# Patient Record
Sex: MALE | Race: WHITE | NOT HISPANIC OR LATINO | Employment: OTHER | URBAN - NONMETROPOLITAN AREA
[De-identification: names, ages, dates, MRNs, and addresses within clinical notes are randomized per-mention and may not be internally consistent; named-entity substitution may affect disease eponyms.]

---

## 2017-02-04 ENCOUNTER — COMMUNICATION - GICH (OUTPATIENT)
Dept: FAMILY MEDICINE | Facility: OTHER | Age: 72
End: 2017-02-04

## 2017-02-04 DIAGNOSIS — I48.20 CHRONIC ATRIAL FIBRILLATION (H): ICD-10-CM

## 2017-02-04 DIAGNOSIS — Z79.01 LONG TERM CURRENT USE OF ANTICOAGULANT: ICD-10-CM

## 2017-05-01 ENCOUNTER — ANTICOAGULATION - GICH (OUTPATIENT)
Dept: INTERNAL MEDICINE | Facility: OTHER | Age: 72
End: 2017-05-01

## 2017-05-01 DIAGNOSIS — Z79.01 LONG TERM CURRENT USE OF ANTICOAGULANT: ICD-10-CM

## 2017-05-01 DIAGNOSIS — I48.20 CHRONIC ATRIAL FIBRILLATION (H): ICD-10-CM

## 2017-05-01 LAB
ERYTHROCYTE [DISTWIDTH] IN BLOOD BY AUTOMATED COUNT: 12.6 % (ref 11.5–15.5)
HCT VFR BLD AUTO: 35.1 % (ref 37–53)
HEMOGLOBIN: 12.1 G/DL (ref 13.5–17.5)
INR - HISTORICAL: 2
MCH RBC QN AUTO: 37.3 PG (ref 26–34)
MCHC RBC AUTO-ENTMCNC: 34.4 G/DL (ref 32–36)
MCV RBC AUTO: 108 FL (ref 80–100)
PLATELET # BLD AUTO: 139 THOU/CU MM (ref 140–440)
PMV BLD: 8.2 FL (ref 6.5–11)
RED BLOOD COUNT - HISTORICAL: 3.24 MIL/CU MM (ref 4.3–5.9)
WHITE BLOOD COUNT - HISTORICAL: 5.8 THOU/CU MM (ref 4.5–11)

## 2017-05-15 ENCOUNTER — HISTORY (OUTPATIENT)
Dept: EMERGENCY MEDICINE | Facility: OTHER | Age: 72
End: 2017-05-15

## 2017-05-17 RX ORDER — SODIUM PHOSPHATE,MONO-DIBASIC 19G-7G/118
2 ENEMA (ML) RECTAL DAILY
COMMUNITY
End: 2017-05-22

## 2017-05-19 ENCOUNTER — ANTICOAGULATION - GICH (OUTPATIENT)
Dept: INTERNAL MEDICINE | Facility: OTHER | Age: 72
End: 2017-05-19

## 2017-05-19 DIAGNOSIS — Z79.01 LONG TERM CURRENT USE OF ANTICOAGULANT: ICD-10-CM

## 2017-05-19 DIAGNOSIS — I48.20 CHRONIC ATRIAL FIBRILLATION (H): ICD-10-CM

## 2017-05-19 LAB — INR - HISTORICAL: 1.4

## 2017-05-22 ENCOUNTER — OFFICE VISIT (OUTPATIENT)
Dept: OTOLARYNGOLOGY | Facility: OTHER | Age: 72
End: 2017-05-22
Attending: OTOLARYNGOLOGY
Payer: MEDICARE

## 2017-05-22 ENCOUNTER — ANTICOAGULATION - GICH (OUTPATIENT)
Dept: INTERNAL MEDICINE | Facility: OTHER | Age: 72
End: 2017-05-22

## 2017-05-22 ENCOUNTER — TRANSFERRED RECORDS (OUTPATIENT)
Dept: HEALTH INFORMATION MANAGEMENT | Facility: HOSPITAL | Age: 72
End: 2017-05-22

## 2017-05-22 VITALS
SYSTOLIC BLOOD PRESSURE: 136 MMHG | WEIGHT: 200 LBS | OXYGEN SATURATION: 97 % | HEART RATE: 50 BPM | BODY MASS INDEX: 30.31 KG/M2 | TEMPERATURE: 96.6 F | HEIGHT: 68 IN | DIASTOLIC BLOOD PRESSURE: 58 MMHG

## 2017-05-22 DIAGNOSIS — R49.0 DYSPHONIA: ICD-10-CM

## 2017-05-22 DIAGNOSIS — G47.33 OSA ON CPAP: ICD-10-CM

## 2017-05-22 DIAGNOSIS — Z79.01 CHRONIC ANTICOAGULATION: ICD-10-CM

## 2017-05-22 DIAGNOSIS — J36 PERITONSILLAR ABSCESS: Primary | ICD-10-CM

## 2017-05-22 DIAGNOSIS — E04.1 THYROID NODULE: ICD-10-CM

## 2017-05-22 DIAGNOSIS — J38.3 GLOTTIC INSUFFICIENCY: ICD-10-CM

## 2017-05-22 DIAGNOSIS — Z79.01 LONG TERM CURRENT USE OF ANTICOAGULANT: ICD-10-CM

## 2017-05-22 DIAGNOSIS — I48.20 CHRONIC ATRIAL FIBRILLATION (H): ICD-10-CM

## 2017-05-22 LAB — INR - HISTORICAL: 1.3

## 2017-05-22 PROCEDURE — 99203 OFFICE O/P NEW LOW 30 MIN: CPT

## 2017-05-22 PROCEDURE — 99204 OFFICE O/P NEW MOD 45 MIN: CPT | Mod: 25 | Performed by: OTOLARYNGOLOGY

## 2017-05-22 PROCEDURE — 31575 DIAGNOSTIC LARYNGOSCOPY: CPT | Performed by: OTOLARYNGOLOGY

## 2017-05-22 ASSESSMENT — PAIN SCALES - GENERAL: PAINLEVEL: NO PAIN (0)

## 2017-05-22 NOTE — NURSING NOTE
"Chief Complaint   Patient presents with     Throat Problem     Pt has been referred by [facility] for peritonsillar abscess.  This started with a tooth extraction.  Tonsil feels good now.  Pt was placed on clindamycin and dexamethasone.       Initial /58 (BP Location: Right arm, Cuff Size: Adult Regular)  Pulse 50  Temp 96.6  F (35.9  C) (Tympanic)  Ht 5' 8\" (1.727 m)  Wt 200 lb (90.7 kg)  SpO2 97%  BMI 30.41 kg/m2 Estimated body mass index is 30.41 kg/(m^2) as calculated from the following:    Height as of this encounter: 5' 8\" (1.727 m).    Weight as of this encounter: 200 lb (90.7 kg).  Medication Reconciliation: complete   Seda Martel LPN      "

## 2017-05-22 NOTE — PROGRESS NOTES
Otolaryngology Consultation    Patient: Angel Grarett  : 1945    Patient presents with:  Throat Problem: Pt has been referred by [facility] for peritonsillar abscess.  This started with a tooth extraction.  Tonsil feels good now.  Pt was placed on clindamycin and dexamethasone.      HPI:  Angel Garrett is a 71 year old male seen today for a left peritonsillar abscess.  Onset 1 week ago . Seemed to correspond to a dental extraction upper left molar.        I was notified from [facility] last week and we started IV clindacin and decadron, observed Pelon overnight.  He responded well and was discharged on outpatient clindamycin    He denies any further dysphagia, odynophagia, trismus or tonsllar pain.  Referred otalgia resoved after IV abx therapy.     No prior peritonsillar abscess nor recurrent tonsillitis    He is anticoagulated, conric afib, with a hx of pulmonary HTN  He has JANESSA and is compliant with CPaP    He also has a hx of a vocal cord nodule, removed years ago at [place].  Since surgery he states his voice has 'never been the same' .   Cannot sing as he once could sing.  No aphonia or troublesome dysphonia    Never smoker    Abscess noted on from 5/15/, CT 2.8 cm Left tonsillar and peritonsillar abscess, reviewed personally with Pelon and his wife today.  There are multiple small nodules in thyroid.  No family hx of thyroid cancer nor personal hx radiation exposure.  No chronic lower thorat globus      Current Outpatient Rx   Medication Sig Dispense Refill     CLINDAMYCIN HCL PO Take 300 mg by mouth 4 times daily       DIGOXIN PO Take 250 mcg by mouth daily       Furosemide (LASIX PO) Take 20 mg by mouth 2 times daily       LISINOPRIL PO Take 20 mg by mouth daily       WARFARIN SODIUM PO Take 3 mg by mouth daily          Allergies: Review of patient's allergies indicates no known allergies.     No past medical history on file.    No past surgical history on file.    ENT family history reviewed    Social History  "  Substance Use Topics     Smoking status: Former Smoker     Quit date: 9/17/1984     Smokeless tobacco: Not on file     Alcohol use Yes       Review of Systems  ROS: 10 point ROS neg other than the symptoms noted above in the HPI and neck and joint pain    Physical Exam  /58 (BP Location: Right arm, Cuff Size: Adult Regular)  Pulse 50  Temp 96.6  F (35.9  C) (Tympanic)  Ht 5' 8\" (1.727 m)  Wt 200 lb (90.7 kg)  SpO2 97%  BMI 30.41 kg/m2  General - The patient is well nourished and well developed, and appears to have good nutritional status.  Alert and oriented to person and place, answers questions and cooperates with examination appropriately.   Head and Face - Normocephalic and atraumatic, with no gross asymmetry noted.  The facial nerve is intact, with strong symmetric movements.  Voice and Breathing - The patient was breathing comfortably without the use of accessory muscles. There was no wheezing, stridor, or stertor.  The patients voice was clear and strong, and had appropriate pitch and quality.  Ears -The external auditory canals are patent, the tympanic membranes are intact without effusion, retraction or mass.  Bony landmarks are intact.  Eyes - Extraocular movements intact, and the pupils were reactive to light.  Sclera were not icteric or injected, conjunctiva were pink and moist.  Mouth - Examination of the oral cavity showed pink, healthy oral mucosa. No lesions or ulcerations noted.  The tongue was mobile and midline, and there is an extracted #13, cracked capped #14 tooth, o/w dentition were in good condition.      Throat - The walls of the oropharynx were smooth, pink, moist, symmetric, and had no lesions or ulcerations.  The tonsillar pillars and soft palate were symmetric.  The uvula was midline on elevation.  Grade 2 symmetric tonsils  Neck - Normal midline excursion of the laryngotracheal complex during swallowing.  Full range of motion on passive movement.  Palpation of the " occipital, submental, submandibular, internal jugular chain, and supraclavicular nodes did not demonstrate any abnormal lymph nodes or masses.  Palpation of the thyroid was soft and smooth, with no nodules or goiter appreciated.  The trachea was mobile and midline.  Nose - External contour is symmetric, no gross deflection or scars.  Nasal mucosa is pink and moist with no abnormal mucus.  The septum and turbinates were evaluated: spur along floor left nares.  No polyps, masses, or purulence noted on examination.    Attempts at mirror laryngoscopy were not possible due to gag reflex.  Therefore I proceeded with a fiberoptic examination after informed consent.  First I sprayed both sides of the nose with a mixture of lidocaine and neosynephrine.  I then passed the scope through the nasal cavity.     The nasal cavity was unremarkable    The nasopharynx was mucosally covered and symmetric.  The eustachian tube openings were unobstructed.  Going further down I had a clear view of the base of tongue which had normal appearing lingual tonsillar tissue.  Narrow posterior oropharyngeal wall due to c-spine.   The base of tongue was free of lesions, and the vallecula was open.  The epiglottis was smooth and mucosally covered.  The supraglottic larynx was then clearly visualized.  The left arytenoid is sluggish and medialized upon phonation at times.  The vocal cords moved smoothly and symmetrically and were pearly white.  Mild glottic insufficiency.   The pyriform sinuses were open and without manfred mass or pooling of secretions upon valsalva, and the limited view of the postcricoid region did not show any lesions.  The patient tolerated the procedure well.      Impression and Plan- Angel LINNEA Garrett is a 71 year old male with:    ICD-10-CM    1. Peritonsillar abscess, history of, resolved J36    2. Dysphonia R49.0    3. 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 Thyroid nodule E04.1    5. JANESSA on CPAP G47.33    6. 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  Otolaryngology/Head and Neck Surgery  Allergy

## 2017-05-22 NOTE — PATIENT INSTRUCTIONS
Thank you for allowing Dr. Jacobo and our ENT team to participate in your care.  If you have a scheduling or an appointment question please contact Rita our Health Unit Coordinator at their direct line [phone].   ALL nursing questions or concerns can be directed to your ENT nurse at: [phone] - Nini    Increase Water Intake  Decrease Caffeine (2 cups or less per day)  Consider Voice Therapy  The peritonsillar abscess has been treated  Complete antibiotic as directed  Follow up with ENT as needed

## 2017-05-22 NOTE — MR AVS SNAPSHOT
"              After Visit Summary   5/22/2017    Angel Garrett    MRN: [id number]           Patient Information     Date Of Birth          1945        Visit Information        Provider Department      5/22/2017 8:45 AM Mandi Jacobo MD [facility]        Care Instructions    Thank you for allowing Dr. Jacobo and our ENT team to participate in your care.  If you have a scheduling or an appointment question please contact [facility] Unit Coordinator at their direct line [phone].   ALL nursing questions or concerns can be directed to your ENT nurse at: [phone] - Nini    Increase Water Intake  Decrease Caffeine (2 cups or less per day)  Consider Voice Therapy  The peritonsillar abscess has been treated  Complete antibiotic as directed  Follow up with ENT as needed        Follow-ups after your visit        Follow-up notes from your care team     Return if symptoms worsen or fail to improve.      Who to contact     If you have questions or need follow up information about today's clinic visit or your schedule please contact [facility] directly at [phone].  Normal or non-critical lab and imaging results will be communicated to you by MyChart, letter or phone within 4 business days after the clinic has received the results. If you do not hear from us within 7 days, please contact the clinic through [employer] or phone. If you have a critical or abnormal lab result, we will notify you by phone as soon as possible.  Submit refill requests through [employer] or call your pharmacy and they will forward the refill request to us. Please allow 3 business days for your refill to be completed.          Additional Information About Your Visit        [employer] Information     [employer] lets you send messages to your doctor, view your test results, renew your prescriptions, schedule appointments and more. To sign up, go to [email or web address] . Click on \"Log in\" on the " "left side of the screen, which will take you to the Welcome page. Then click on \"Sign up Now\" on the right side of the page.     You will be asked to enter the access code listed below, as well as some personal information. Please follow the directions to create your username and password.     Your access code is: [email or web address]  Expires: 2017  9:06 AM     Your access code will  in 90 days. If you need help or a new code, please call your [facility] clinic or [phone].        Care EveryWhere ID     This is your Care EveryWhere ID. This could be used by other organizations to access your [facility] medical records  [id number]        Your Vitals Were     Pulse Temperature Height Pulse Oximetry BMI (Body Mass Index)       50 96.6  F (35.9  C) (Tympanic) 5' 8\" (1.727 m) 97% 30.41 kg/m2        Blood Pressure from Last 3 Encounters:   17 136/58    Weight from Last 3 Encounters:   17 200 lb (90.7 kg)              Today, you had the following     No orders found for display       Primary Care Provider Office Phone # Fax #    Noman Miles [phone]       [facility] [address] [facility] [address]        Thank you!     Thank you for choosing [facility]  for your care. Our goal is always to provide you with excellent care. Hearing back from our patients is one way we can continue to improve our services. Please take a few minutes to complete the written survey that you may receive in the mail after your visit with us. Thank you!             Your Updated Medication List - Protect others around you: Learn how to safely use, store and throw away your medicines at www.disposemymeds.org.          This list is accurate as of: 17  9:06 AM.  Always use your most recent med list.                   Brand Name Dispense Instructions for use    CLINDAMYCIN HCL PO      Take 300 mg by mouth 4 times daily       DIGOXIN PO      Take 250 mcg by mouth daily    "    LASIX PO      Take 20 mg by mouth 2 times daily       LISINOPRIL PO      Take 20 mg by mouth daily       WARFARIN SODIUM PO      Take 3 mg by mouth daily

## 2017-05-31 ENCOUNTER — ANTICOAGULATION - GICH (OUTPATIENT)
Dept: INTERNAL MEDICINE | Facility: OTHER | Age: 72
End: 2017-05-31

## 2017-05-31 ENCOUNTER — OFFICE VISIT - GICH (OUTPATIENT)
Dept: FAMILY MEDICINE | Facility: OTHER | Age: 72
End: 2017-05-31

## 2017-05-31 ENCOUNTER — HISTORY (OUTPATIENT)
Dept: FAMILY MEDICINE | Facility: OTHER | Age: 72
End: 2017-05-31

## 2017-05-31 DIAGNOSIS — J36 PERITONSILLAR ABSCESS: ICD-10-CM

## 2017-05-31 DIAGNOSIS — I48.20 CHRONIC ATRIAL FIBRILLATION (H): ICD-10-CM

## 2017-05-31 DIAGNOSIS — Z79.01 LONG TERM CURRENT USE OF ANTICOAGULANT: ICD-10-CM

## 2017-05-31 LAB — INR - HISTORICAL: 1.7

## 2017-06-08 ENCOUNTER — ANTICOAGULATION - GICH (OUTPATIENT)
Dept: INTERNAL MEDICINE | Facility: OTHER | Age: 72
End: 2017-06-08

## 2017-06-08 DIAGNOSIS — I48.20 CHRONIC ATRIAL FIBRILLATION (H): ICD-10-CM

## 2017-06-08 DIAGNOSIS — Z79.01 LONG TERM CURRENT USE OF ANTICOAGULANT: ICD-10-CM

## 2017-06-08 LAB — INR - HISTORICAL: 2.1

## 2017-07-06 ENCOUNTER — ANTICOAGULATION - GICH (OUTPATIENT)
Dept: INTERNAL MEDICINE | Facility: OTHER | Age: 72
End: 2017-07-06

## 2017-07-06 DIAGNOSIS — I48.20 CHRONIC ATRIAL FIBRILLATION (H): ICD-10-CM

## 2017-07-06 DIAGNOSIS — Z79.01 LONG TERM CURRENT USE OF ANTICOAGULANT: ICD-10-CM

## 2017-07-06 LAB — INR - HISTORICAL: 1.9

## 2017-08-10 ENCOUNTER — AMBULATORY - GICH (OUTPATIENT)
Dept: SCHEDULING | Facility: OTHER | Age: 72
End: 2017-08-10

## 2017-08-11 ENCOUNTER — AMBULATORY - GICH (OUTPATIENT)
Dept: SCHEDULING | Facility: OTHER | Age: 72
End: 2017-08-11

## 2017-08-15 ENCOUNTER — AMBULATORY - GICH (OUTPATIENT)
Dept: SCHEDULING | Facility: OTHER | Age: 72
End: 2017-08-15

## 2017-08-22 ENCOUNTER — AMBULATORY - GICH (OUTPATIENT)
Dept: SCHEDULING | Facility: OTHER | Age: 72
End: 2017-08-22

## 2017-08-31 ENCOUNTER — AMBULATORY - GICH (OUTPATIENT)
Dept: LAB | Facility: OTHER | Age: 72
End: 2017-08-31

## 2017-08-31 DIAGNOSIS — I50.9 HEART FAILURE (H): ICD-10-CM

## 2017-08-31 DIAGNOSIS — I07.1 RHEUMATIC TRICUSPID INSUFFICIENCY: ICD-10-CM

## 2017-08-31 DIAGNOSIS — Q24.9 CONGENITAL MALFORMATION OF HEART: ICD-10-CM

## 2017-09-01 ENCOUNTER — AMBULATORY - GICH (OUTPATIENT)
Dept: LAB | Facility: OTHER | Age: 72
End: 2017-09-01

## 2017-09-01 DIAGNOSIS — I07.1 RHEUMATIC TRICUSPID INSUFFICIENCY: ICD-10-CM

## 2017-09-01 DIAGNOSIS — I50.9 HEART FAILURE (H): ICD-10-CM

## 2017-09-01 DIAGNOSIS — Q24.9 CONGENITAL MALFORMATION OF HEART: ICD-10-CM

## 2017-09-01 LAB
CREAT SERPL-MCNC: 1.44 MG/DL (ref 0.7–1.3)
GFR IF NOT AFRICAN AMERICAN - HISTORICAL: 48 ML/MIN/1.73M2
POTASSIUM SERPL-SCNC: 4.2 MMOL/L (ref 3.5–5.1)
SODIUM SERPL-SCNC: 142 MMOL/L (ref 133–143)

## 2017-09-18 ENCOUNTER — OFFICE VISIT - GICH (OUTPATIENT)
Dept: FAMILY MEDICINE | Facility: OTHER | Age: 72
End: 2017-09-18

## 2017-09-18 ENCOUNTER — HISTORY (OUTPATIENT)
Dept: FAMILY MEDICINE | Facility: OTHER | Age: 72
End: 2017-09-18

## 2017-09-18 ENCOUNTER — ANTICOAGULATION - GICH (OUTPATIENT)
Dept: INTERNAL MEDICINE | Facility: OTHER | Age: 72
End: 2017-09-18

## 2017-09-18 DIAGNOSIS — Z87.74 PERSONAL HISTORY OF CORRECTED CONGENITAL MALFORMATIONS OF HEART AND CIRCULATORY SYSTEM: ICD-10-CM

## 2017-09-18 DIAGNOSIS — M25.551 PAIN IN RIGHT HIP: ICD-10-CM

## 2017-09-18 DIAGNOSIS — I27.29 OTHER SECONDARY PULMONARY HYPERTENSION (H): ICD-10-CM

## 2017-09-18 DIAGNOSIS — Z79.01 LONG TERM CURRENT USE OF ANTICOAGULANT: ICD-10-CM

## 2017-09-18 DIAGNOSIS — I48.20 CHRONIC ATRIAL FIBRILLATION (H): ICD-10-CM

## 2017-09-18 LAB — INR - HISTORICAL: 1.9

## 2017-09-28 ENCOUNTER — ANTICOAGULATION - GICH (OUTPATIENT)
Dept: INTERNAL MEDICINE | Facility: OTHER | Age: 72
End: 2017-09-28

## 2017-09-28 DIAGNOSIS — I48.20 CHRONIC ATRIAL FIBRILLATION (H): ICD-10-CM

## 2017-09-28 DIAGNOSIS — Z79.01 LONG TERM CURRENT USE OF ANTICOAGULANT: ICD-10-CM

## 2017-09-28 LAB — INR - HISTORICAL: 1.5

## 2017-10-16 ENCOUNTER — OFFICE VISIT - GICH (OUTPATIENT)
Dept: FAMILY MEDICINE | Facility: OTHER | Age: 72
End: 2017-10-16

## 2017-10-16 ENCOUNTER — AMBULATORY - GICH (OUTPATIENT)
Dept: LAB | Facility: OTHER | Age: 72
End: 2017-10-16

## 2017-10-16 ENCOUNTER — HISTORY (OUTPATIENT)
Dept: FAMILY MEDICINE | Facility: OTHER | Age: 72
End: 2017-10-16

## 2017-10-16 ENCOUNTER — ANTICOAGULATION - GICH (OUTPATIENT)
Dept: INTERNAL MEDICINE | Facility: OTHER | Age: 72
End: 2017-10-16

## 2017-10-16 DIAGNOSIS — I48.20 CHRONIC ATRIAL FIBRILLATION (H): ICD-10-CM

## 2017-10-16 DIAGNOSIS — Z87.74 PERSONAL HISTORY OF CORRECTED CONGENITAL MALFORMATIONS OF HEART AND CIRCULATORY SYSTEM: ICD-10-CM

## 2017-10-16 DIAGNOSIS — Z79.01 ANTICOAGULATION MONITORING, INR RANGE 2-3: Primary | ICD-10-CM

## 2017-10-16 DIAGNOSIS — Z79.01 LONG TERM CURRENT USE OF ANTICOAGULANT: ICD-10-CM

## 2017-10-16 LAB
ANION GAP - HISTORICAL: 8 (ref 5–18)
BUN SERPL-MCNC: 47 MG/DL (ref 7–25)
BUN/CREAT RATIO - HISTORICAL: 30
CALCIUM SERPL-MCNC: 9.6 MG/DL (ref 8.6–10.3)
CHLORIDE SERPLBLD-SCNC: 102 MMOL/L (ref 98–107)
CO2 SERPL-SCNC: 29 MMOL/L (ref 21–31)
CREAT SERPL-MCNC: 1.56 MG/DL (ref 0.7–1.3)
GFR IF NOT AFRICAN AMERICAN - HISTORICAL: 44 ML/MIN/1.73M2
GLUCOSE SERPL-MCNC: 110 MG/DL (ref 70–105)
INR - HISTORICAL: 2.4
POTASSIUM SERPL-SCNC: 4.3 MMOL/L (ref 3.5–5.1)
SODIUM SERPL-SCNC: 139 MMOL/L (ref 133–143)

## 2017-10-24 ENCOUNTER — COMMUNICATION - GICH (OUTPATIENT)
Dept: FAMILY MEDICINE | Facility: OTHER | Age: 72
End: 2017-10-24

## 2017-10-24 DIAGNOSIS — I48.20 CHRONIC ATRIAL FIBRILLATION (H): ICD-10-CM

## 2017-11-01 ENCOUNTER — ANTICOAGULATION - GICH (OUTPATIENT)
Dept: INTERNAL MEDICINE | Facility: OTHER | Age: 72
End: 2017-11-01

## 2017-11-01 DIAGNOSIS — I48.20 CHRONIC ATRIAL FIBRILLATION (H): ICD-10-CM

## 2017-11-01 DIAGNOSIS — Z79.01 LONG TERM CURRENT USE OF ANTICOAGULANT: ICD-10-CM

## 2017-11-01 LAB — INR - HISTORICAL: 1.9

## 2017-11-03 DIAGNOSIS — Z01.818 PRE-OP EXAM: Primary | ICD-10-CM

## 2017-11-03 DIAGNOSIS — Z79.01 ANTICOAGULATED ON COUMADIN: ICD-10-CM

## 2017-11-03 LAB — INR PPP: 1.86 (ref 0.8–1.2)

## 2017-11-03 PROCEDURE — 85610 PROTHROMBIN TIME: CPT | Performed by: DENTIST

## 2017-11-03 PROCEDURE — 36415 COLL VENOUS BLD VENIPUNCTURE: CPT | Performed by: DENTIST

## 2017-11-07 ENCOUNTER — ANTICOAGULATION - GICH (OUTPATIENT)
Dept: INTERNAL MEDICINE | Facility: OTHER | Age: 72
End: 2017-11-07

## 2017-11-07 DIAGNOSIS — Z79.01 LONG TERM CURRENT USE OF ANTICOAGULANT: ICD-10-CM

## 2017-11-07 DIAGNOSIS — I48.20 CHRONIC ATRIAL FIBRILLATION (H): ICD-10-CM

## 2017-11-07 LAB — INR - HISTORICAL: 2

## 2017-12-04 ENCOUNTER — ANTICOAGULATION - GICH (OUTPATIENT)
Dept: INTERNAL MEDICINE | Facility: OTHER | Age: 72
End: 2017-12-04

## 2017-12-04 ENCOUNTER — COMMUNICATION - GICH (OUTPATIENT)
Dept: FAMILY MEDICINE | Facility: OTHER | Age: 72
End: 2017-12-04

## 2017-12-04 DIAGNOSIS — I48.20 CHRONIC ATRIAL FIBRILLATION (H): ICD-10-CM

## 2017-12-04 DIAGNOSIS — Z79.01 LONG TERM CURRENT USE OF ANTICOAGULANT: ICD-10-CM

## 2017-12-04 DIAGNOSIS — I10 ESSENTIAL (PRIMARY) HYPERTENSION: ICD-10-CM

## 2017-12-04 DIAGNOSIS — R73.03 PREDIABETES: ICD-10-CM

## 2017-12-04 LAB — INR - HISTORICAL: 2.2

## 2017-12-06 ENCOUNTER — OFFICE VISIT - GICH (OUTPATIENT)
Dept: FAMILY MEDICINE | Facility: OTHER | Age: 72
End: 2017-12-06

## 2017-12-06 ENCOUNTER — HISTORY (OUTPATIENT)
Dept: FAMILY MEDICINE | Facility: OTHER | Age: 72
End: 2017-12-06

## 2017-12-06 DIAGNOSIS — N42.9 DISORDER OF PROSTATE: ICD-10-CM

## 2017-12-06 DIAGNOSIS — G47.33 OBSTRUCTIVE SLEEP APNEA: ICD-10-CM

## 2017-12-06 DIAGNOSIS — E78.5 HYPERLIPIDEMIA: ICD-10-CM

## 2017-12-06 DIAGNOSIS — Z79.01 LONG TERM CURRENT USE OF ANTICOAGULANT: ICD-10-CM

## 2017-12-06 DIAGNOSIS — I10 ESSENTIAL (PRIMARY) HYPERTENSION: ICD-10-CM

## 2017-12-06 DIAGNOSIS — I48.20 CHRONIC ATRIAL FIBRILLATION (H): ICD-10-CM

## 2017-12-06 DIAGNOSIS — M25.551 PAIN IN RIGHT HIP: ICD-10-CM

## 2017-12-06 DIAGNOSIS — I27.20 PULMONARY HYPERTENSION (H): ICD-10-CM

## 2017-12-06 DIAGNOSIS — Z87.74 PERSONAL HISTORY OF CORRECTED CONGENITAL MALFORMATIONS OF HEART AND CIRCULATORY SYSTEM: ICD-10-CM

## 2017-12-06 DIAGNOSIS — R73.03 PREDIABETES: ICD-10-CM

## 2017-12-06 LAB
A/G RATIO - HISTORICAL: 1.2 (ref 1–2)
ABSOLUTE BASOPHILS - HISTORICAL: 0 THOU/CU MM
ABSOLUTE EOSINOPHILS - HISTORICAL: 0.1 THOU/CU MM
ABSOLUTE IMMATURE GRANULOCYTES(METAS,MYELOS,PROS) - HISTORICAL: 0 THOU/CU MM
ABSOLUTE LYMPHOCYTES - HISTORICAL: 1.8 THOU/CU MM (ref 0.9–2.9)
ABSOLUTE MONOCYTES - HISTORICAL: 0.5 THOU/CU MM
ABSOLUTE NEUTROPHILS - HISTORICAL: 3 THOU/CU MM (ref 1.7–7)
ALBUMIN SERPL-MCNC: 4.1 G/DL (ref 3.5–5.7)
ALP SERPL-CCNC: 74 IU/L (ref 34–104)
ALT (SGPT) - HISTORICAL: 13 IU/L (ref 7–52)
ANION GAP - HISTORICAL: 9 (ref 5–18)
AST SERPL-CCNC: 20 IU/L (ref 13–39)
BASOPHILS # BLD AUTO: 0.4 %
BILIRUB SERPL-MCNC: 0.9 MG/DL (ref 0.3–1)
BUN SERPL-MCNC: 38 MG/DL (ref 7–25)
BUN/CREAT RATIO - HISTORICAL: 27
CALCIUM SERPL-MCNC: 9.1 MG/DL (ref 8.6–10.3)
CHLORIDE SERPLBLD-SCNC: 103 MMOL/L (ref 98–107)
CHOL/HDL RATIO - HISTORICAL: 2.49
CHOLESTEROL TOTAL: 152 MG/DL
CO2 SERPL-SCNC: 28 MMOL/L (ref 21–31)
CREAT SERPL-MCNC: 1.43 MG/DL (ref 0.7–1.3)
DATE OF LAST DOSE - HISTORICAL: NORMAL
DIGOXIN - HISTORICAL: 0.9 NG/ML (ref 0.8–2)
EOSINOPHIL NFR BLD AUTO: 2.4 %
ERYTHROCYTE [DISTWIDTH] IN BLOOD BY AUTOMATED COUNT: 12.7 % (ref 11.5–15.5)
ESTIMATED AVERAGE GLUCOSE: 103 MG/DL
GFR IF NOT AFRICAN AMERICAN - HISTORICAL: 48 ML/MIN/1.73M2
GLOBULIN - HISTORICAL: 3.5 G/DL (ref 2–3.7)
GLUCOSE SERPL-MCNC: 106 MG/DL (ref 70–105)
HCT VFR BLD AUTO: 39.9 % (ref 37–53)
HDLC SERPL-MCNC: 61 MG/DL (ref 23–92)
HEMOGLOBIN A1C MONITORING (POCT) - HISTORICAL: 5.2 % (ref 4–6.2)
HEMOGLOBIN: 13.7 G/DL (ref 13.5–17.5)
IMMATURE GRANULOCYTES(METAS,MYELOS,PROS) - HISTORICAL: 0.2 %
LDLC SERPL CALC-MCNC: 74 MG/DL
LYMPHOCYTES NFR BLD AUTO: 33.8 % (ref 20–44)
MCH RBC QN AUTO: 36.5 PG (ref 26–34)
MCHC RBC AUTO-ENTMCNC: 34.3 G/DL (ref 32–36)
MCV RBC AUTO: 106 FL (ref 80–100)
MONOCYTES NFR BLD AUTO: 8.5 %
NEUTROPHILS NFR BLD AUTO: 54.7 % (ref 42–72)
NON-HDL CHOLESTEROL - HISTORICAL: 91 MG/DL
PLATELET # BLD AUTO: 147 THOU/CU MM (ref 140–440)
PMV BLD: 9.7 FL (ref 6.5–11)
POTASSIUM SERPL-SCNC: 3.9 MMOL/L (ref 3.5–5.1)
PROT SERPL-MCNC: 7.6 G/DL (ref 6.4–8.9)
PROVIDER ORDERDED STATUS - HISTORICAL: NORMAL
PSA TOTAL (DIAGNOSTIC) - HISTORICAL: 3.76 NG/ML
RED BLOOD COUNT - HISTORICAL: 3.75 MIL/CU MM (ref 4.3–5.9)
SODIUM SERPL-SCNC: 140 MMOL/L (ref 133–143)
TIME OF DOSE: NORMAL
TRIGL SERPL-MCNC: 84 MG/DL
WHITE BLOOD COUNT - HISTORICAL: 5.4 THOU/CU MM (ref 4.5–11)

## 2017-12-27 NOTE — PROGRESS NOTES
Patient Information     Patient Name MRN Sex Angel Pisano [id number] Male 1945      Progress Notes by Noman Miles MD at 10/16/2017 11:15 AM     Author:  Noman Miles MD Service:  (none) Author Type:  Physician     Filed:  10/16/2017 11:31 AM Encounter Date:  10/16/2017 Status:  Signed     :  Noman Miles MD (Physician)            SUBJECTIVE:  Angel Garrett is a 72 y.o. male who presents for follow-up of pulmonary arterial hypertension, congenital heart disease and chronic atrial fibrillation. He was seen recently at the [facility] and placed on torsemide instead of furosemide. His creatinine was up slightly at last visit and I wanted him to come back for recheck. His weight is up 2 pounds since last check. He denies peripheral edema and overall feels well.    No Known Allergies and   Current Outpatient Prescriptions on File Prior to Visit       Medication  Sig Dispense Refill     acetaminophen (TYLENOL EXTRA STRGTH) 500 mg tablet Take 1,000 mg by mouth every 4 hours while awake. Max acetaminophen dose: 4000mg in 24 hrs.       atenolol (TENORMIN) 50 mg tablet Take 1 tablet by mouth once daily. 90 tablet 3     digoxin (LANOXIN) 250 mcg tablet Take 1 tablet by mouth once daily. 90 tablet 3     GLUCOSAMINE/CHONDROITIN SULF A (GLUCOSAMINE-CHONDROITIN ORAL) Take 2 tablets by mouth once daily.       lisinopril (PRINIVIL; ZESTRIL) 20 mg tablet Take 20 mg by mouth once daily.       omega-3 fatty acids-vitamin E (FISH OIL) 1,000 mg cap Take 2 capsules by mouth once daily.  0     tadalafil, antihypertensive, (ADCIRCA) 20 mg tab Take 40 mg by mouth every morning.       warfarin (COUMADIN) 3 mg tablet Take 9 mg x one day/week, and 6 mg x six days/week. 180 tablet 0     No current facility-administered medications on file prior to visit.        OBJECTIVE:  /40  Pulse 60  Wt 91.6 kg (202 lb)  BMI 30.71 kg/m2  EXAM:  General Appearance: Pleasant, alert, appropriate appearance for  age. No acute distress  Chest/Respiratory Exam: Normal chest wall and respirations. Clear to auscultation.  Cardiovascular Exam: Bradycardic rate with an irregular rhythm, no murmurs.  Foot Exam: No peripheral edema noted.    SODIUM      Date Value Ref Range Status   10/16/2017 139 133 - 143 mmol/L Final     POTASSIUM      Date Value Ref Range Status   10/16/2017 4.3 3.5 - 5.1 mmol/L Final     CHLORIDE      Date Value Ref Range Status   10/16/2017 102 98 - 107 mmol/L Final     CO2,TOTAL      Date Value Ref Range Status   10/16/2017 29 21 - 31 mmol/L Final     ANION GAP      Date Value Ref Range Status   10/16/2017 8 5 - 18                 Final     GLUCOSE      Date Value Ref Range Status   10/16/2017 110 (H) 70 - 105 mg/dL Final     BUN      Date Value Ref Range Status   10/16/2017 47 (H) 7 - 25 mg/dL Final     CREATININE      Date Value Ref Range Status   10/16/2017 1.56 (H) 0.70 - 1.30 mg/dL Final     BUN/CREAT RATIO                Date Value Ref Range Status   10/16/2017 30                 Final     CALCIUM      Date Value Ref Range Status   10/16/2017 9.6 8.6 - 10.3 mg/dL Final     ASSESSMENT/Plan :      ICD-10-CM    1. Hx of congenital heart disease  Patient's blood pressure is low and he appears a little dry. Elected to decrease torsemide to 20 mg once daily. He will weigh himself daily and watch for return of peripheral edema.  Z87.74    2. Chronic atrial fibrillation (HC) I48.2 torsemide (DEMADEX) 20 mg tablet       Noman Miles MD

## 2017-12-27 NOTE — PROGRESS NOTES
Patient Information     Patient Name MRN Sex Angel Pisano [id number] Male 1945      Progress Notes by Noman Miles MD at 2017  9:45 AM     Author:  Noman Miles MD Service:  (none) Author Type:  Physician     Filed:  2017 11:56 AM Encounter Date:  2017 Status:  Signed     :  Noman Miles MD (Physician)            SUBJECTIVE:  Angel Garrett is a 71 y.o. male who presents to discuss several issues. He was recently evaluated at the [facility] for his pulmonary arterial hypertension and congenital heart disease. He reports that his furosemide was discontinued and he was placed on torsemide. He had follow-up electrolytes done here recently which he would like to review. He was having issues with diarrhea following antibiotic use but this has now resolved. There were concerns that he may have C. difficile. He also has been having intermittent right hip pain which seems to be positional. This doesn't bother him continuously but occasionally with movement will note a twinge of pain in the right groin crease. His weight is down 15 pounds in the last month since starting torsemide. He feels well with no shortness of breath or chest pain.    No Known Allergies,   Current Outpatient Prescriptions on File Prior to Visit       Medication  Sig Dispense Refill     acetaminophen (TYLENOL EXTRA STRGTH) 500 mg tablet Take 1,000 mg by mouth every 4 hours while awake. Max acetaminophen dose: 4000mg in 24 hrs.       atenolol (TENORMIN) 50 mg tablet Take 1 tablet by mouth once daily. 90 tablet 3     digoxin (LANOXIN) 250 mcg tablet Take 1 tablet by mouth once daily. 90 tablet 3     GLUCOSAMINE/CHONDROITIN SULF A (GLUCOSAMINE-CHONDROITIN ORAL) Take 2 tablets by mouth once daily.       lisinopril (PRINIVIL; ZESTRIL) 20 mg tablet Take 20 mg by mouth once daily.       omega-3 fatty acids-vitamin E (FISH OIL) 1,000 mg cap Take 2 capsules by mouth once daily.  0     tadalafil,  antihypertensive, (ADCIRCA) 20 mg tab Take 40 mg by mouth every morning.       warfarin (COUMADIN) 3 mg tablet Take 6 mg daily 180 tablet 0     No current facility-administered medications on file prior to visit.    , No past medical history on file. and   Past Surgical History:      Procedure  Laterality Date     CARDIAC CATHETERIZATION  2016     CARDIAC SURGERY  2002    Pericardial patch, closure of ASD, repair of PFO and repair of anomalous pulmonary venous drainage.       COLONOSCOPY SCREENING  ~2009    Colonography       OPEN CHOLECYSTECTOMY  2008     VASECTOMY         Respiratory: Negative  Cardiovascular: Negative    OBJECTIVE:  /58  Pulse 52  Wt 90.7 kg (200 lb)  BMI 30.41 kg/m2  EXAM:  General Appearance: Pleasant, alert, appropriate appearance for age. No acute distress  Head Exam: Normocephalic, without obvious abnormality.  Chest/Respiratory Exam: Normal chest wall and respirations. Clear to auscultation.  Cardiovascular Exam: Bradycardic rate with regular rhythm, no murmurs.  Foot Exam: No edema noted today.  Musculoskeletal exam: Slightly diminished range of motion of the right hip noted to internal/external rotation. Groin pain noted with passive range of motion.    Results for orders placed or performed in visit on 09/01/17      SODIUM      Result  Value Ref Range    SODIUM 142 133 - 143 mmol/L   POTASSIUM      Result  Value Ref Range    POTASSIUM 4.2 3.5 - 5.1 mmol/L   CREATININE      Result  Value Ref Range    CREATININE 1.44 (H) 0.70 - 1.30 mg/dL    GFR if African American 58 (L) >60 ml/min/1.73m2    GFR if not  48 (L) >60 ml/min/1.73m2       ASSESSMENT/Plan :      ICD-10-CM    1. Hx of congenital heart disease  He has had a good response to torsemide with a reported 15 pound weight loss. His creatinine has risen a little bit above baseline and will plan to recheck this at follow-up in one month. Z87.74 BASIC METABOLIC PANEL   2. Pulmonary hypertension (HC)  Continue  current treatment.  I27.2    3. Right hip pain  Findings most consistent with early osteoarthritis of the right hip. He is not interested in imaging at this time.  M25.551        Noman Miles MD

## 2017-12-28 NOTE — PATIENT INSTRUCTIONS
Patient Information     Patient Name MRAngel Zaldivar [id number] Male 1945      Patient Instructions by Saba Honeycutt RN at 2017 10:45 AM     Author:  Saba Honeycutt RN Service:  (none) Author Type:  NURS- Registered Nurse     Filed:  2017 10:38 AM Encounter Date:  2017 Status:  Signed     :  Saba Honeycutt RN (NURS- Registered Nurse)            2017 Details    Sun Mon Tue Wed Thu Fri Sat          1               2                 3               4               5               6               7               8               9                 10               11               12               13               14               15               16                 17               18      6 mg   See details      19      6 mg         20      6 mg         21      6 mg         22      6 mg         23      6 mg           24      6 mg         25      6 mg         26      6 mg         27      6 mg         28      6 mg         29      6 mg         30      6 mg          Date Details    This INR check               How to take your warfarin dose     To take:  6 mg Take two of the 3 mg tablets.           2017 Details    Sun Mon Tue Wed Thu Fri Sat     1      6 mg         2      6 mg         3      6 mg         4      6 mg         5      6 mg         6      6 mg         7      6 mg           8      6 mg         9      6 mg         10      6 mg         11      6 mg         12      6 mg         13      6 mg         14      6 mg           15      6 mg         16      6 mg         17      6 mg         18      6 mg         19      6 mg         20      6 mg         21      6 mg           22      6 mg         23      6 mg         24      6 mg         25      6 mg         26      6 mg         27      6 mg         28      6 mg           29      6 mg         30      6 mg         31      6 mg              Date Details   No additional details            How to take your  warfarin dose     To take:  6 mg Take two of the 3 mg tablets.           November 2017 Details    Sun Mon Tue Wed Thu Fri Sat        1      6 mg         2      6 mg         3      6 mg         4      6 mg           5      6 mg         6      6 mg         7      6 mg         8      6 mg         9      6 mg         10      6 mg         11      6 mg           12      6 mg         13      6 mg         14               15               16               17               18                 19               20               21               22               23               24               25                 26               27               28               29               30                  Date Details   No additional details    Date of next INR:  11/13/2017         How to take your warfarin dose     To take:  6 mg Take two of the 3 mg tablets.             Description          Continue same Warfarin dose and recheck in 8 weeks. Likes to have INR on the lower end of range. Saba Honeycutt RN    9/18/2017  10:37 AM                Anticoagulation Summary as of 9/18/2017     INR goal 2.0-3.0    Today's INR 1.9    Next INR check 11/13/2017          Call your Anticoagulation Clinic at Dept: [phone]   if:   1. Any medications are started, stopped, or there is a change in dose.  2. You experience any bleeding that is not easily stopped or if it is recurrent.  3. You notice an increase in bruising or any bruising that does not heal.  4. You are scheduled for surgery, colonoscopy, dental extraction or any other procedure where you may need to stop your Coumadin (warfarin).

## 2017-12-28 NOTE — TELEPHONE ENCOUNTER
Patient Information     Patient Name MRN Angel Whitten [id number] Male 1945      Telephone Encounter by Breanna Thakur at 10/25/2017  9:53 AM     Author:  Breanna Thakur Service:  (none) Author Type:  (none)     Filed:  10/25/2017  9:53 AM Encounter Date:  10/24/2017 Status:  Signed     :  Breanna Thakur            Patient notified.  Breanna Thakur LPN  10/25/2017  9:53 AM

## 2017-12-28 NOTE — PATIENT INSTRUCTIONS
Patient Information     Patient Name MRAngel Zaldivar [id number] Male 1945      Patient Instructions by Saba Honeycutt RN at 2017  9:15 AM     Author:  Saba Honeycutt RN Service:  (none) Author Type:  NURS- Registered Nurse     Filed:  2017  9:22 AM Encounter Date:  2017 Status:  Signed     :  Saba Honeycutt RN (NURS- Registered Nurse)            2017 Details    Sun Mon Tue Wed Thu Fri Sat        1               2               3               4                 5               6               7      6 mg   See details      8      6 mg         9      6 mg         10      6 mg         11      9 mg           12      6 mg         13      6 mg         14      6 mg         15      6 mg         16      6 mg         17      6 mg         18      9 mg           19      6 mg         20      6 mg         21      6 mg         22      6 mg         23      6 mg         24      6 mg         25      9 mg           26      6 mg         27      6 mg         28      6 mg         29      6 mg         30      6 mg            Date Details    This INR check               How to take your warfarin dose     To take:  6 mg Take two of the 3 mg tablets.    To take:  9 mg Take three of the 3 mg tablets.           2017 Details    Sun Mon Tue Wed Thu Fri Sat          1      6 mg         2      9 mg           3      6 mg         4      6 mg         5      6 mg         6               7               8               9                 10               11               12               13               14               15               16                 17               18               19               20               21               22               23                 24               25               26               27               28               29               30                 31                      Date Details   No additional details    Date of next INR:  2017          How to take your warfarin dose     To take:  6 mg Take two of the 3 mg tablets.    To take:  9 mg Take three of the 3 mg tablets.             Description          Continue same Warfarin dose and recheck in 1 month.  Saba Honeycutt RN    11/7/2017  9:21 AM                Anticoagulation Summary as of 11/7/2017     INR goal 2.0-3.0    Today's INR 2.0    Next INR check 12/5/2017          Call your Anticoagulation Clinic at Dept: [phone]   if:   1. Any medications are started, stopped, or there is a change in dose.  2. You experience any bleeding that is not easily stopped or if it is recurrent.  3. You notice an increase in bruising or any bruising that does not heal.  4. You are scheduled for surgery, colonoscopy, dental extraction or any other procedure where you may need to stop your Coumadin (warfarin).

## 2017-12-29 NOTE — PATIENT INSTRUCTIONS
Patient Information     Patient Name MRN Angel Whitten [id number] Male 1945      Patient Instructions by Saba Honeycutt RN at 2017 11:15 AM     Author:  Saba Honeycutt RN Service:  (none) Author Type:  NURS- Registered Nurse     Filed:  2017 10:48 AM Encounter Date:  2017 Status:  Signed     :  Saba Honeycutt RN (NURS- Registered Nurse)            2017 Details    Sun Mon  Thu Fri Sat        1      6 mg   See details      2      6 mg         3      6 mg         4      9 mg           5      6 mg         6      6 mg         7      6 mg         8      6 mg         9      6 mg         10      6 mg         11      9 mg           12      6 mg         13      6 mg         14      6 mg         15      6 mg         16      6 mg         17      6 mg         18      9 mg           19      6 mg         20      6 mg         21      6 mg         22      6 mg         23      6 mg         24      6 mg         25      9 mg           26      6 mg         27      6 mg         28      6 mg         29      6 mg         30                  Date Details    This INR check       Date of next INR:  2017         How to take your warfarin dose     To take:  6 mg Take two of the 3 mg tablets.    To take:  9 mg Take three of the 3 mg tablets.             Description          Continue same Warfarin dose and recheck in 1 month.  Saba Honeycutt RN   2017    10:47 AM                  Anticoagulation Summary as of 2017     INR goal 2.0-3.0    Today's INR 1.9    Next INR check 2017          Call your Anticoagulation Clinic at Dept: [phone]   if:   1. Any medications are started, stopped, or there is a change in dose.  2. You experience any bleeding that is not easily stopped or if it is recurrent.  3. You notice an increase in bruising or any bruising that does not heal.  4. You are scheduled for surgery, colonoscopy, dental extraction or any other procedure  where you may need to stop your Coumadin (warfarin).

## 2017-12-29 NOTE — PATIENT INSTRUCTIONS
Patient Information     Patient Name MRAngel Zaldivar [id number] Male 1945      Patient Instructions by Saba Honeycutt RN at 2017 10:15 AM     Author:  Saba Honeycutt RN Service:  (none) Author Type:  NURS- Registered Nurse     Filed:  2017 10:13 AM Encounter Date:  2017 Status:  Signed     :  Saba Honeycutt RN (NURS- Registered Nurse)            2017 Details    Sun Mon Tue Wed Thu Fri Sat           1                 2               3               4               5               6      6 mg   See details      7      6 mg         8      6 mg           9      6 mg         10      6 mg         11      6 mg         12      6 mg         13      6 mg         14      6 mg         15      6 mg           16      6 mg         17      6 mg         18      6 mg         19      6 mg         20      6 mg         21      6 mg         22      6 mg           23      6 mg         24      6 mg         25      6 mg         26      6 mg         27      6 mg         28      6 mg         29      6 mg           30      6 mg         31      6 mg               Date Details    This INR check               How to take your warfarin dose     To take:  6 mg Take two of the 3 mg tablets.           2017 Details    Sun Mon Tue Wed Thu Fri Sat       1      6 mg         2      6 mg         3      6 mg         4      6 mg         5      6 mg           6      6 mg         7      6 mg         8      6 mg         9      6 mg         10      6 mg         11      6 mg         12      6 mg           13      6 mg         14      6 mg         15      6 mg         16      6 mg         17      6 mg         18      6 mg         19      6 mg           20      6 mg         21      6 mg         22      6 mg         23      6 mg         24      6 mg         25      6 mg         26      6 mg           27      6 mg         28      6 mg         29      6 mg         30      6 mg         31      6 mg             Date Details   No additional details            How to take your warfarin dose     To take:  6 mg Take two of the 3 mg tablets.           September 2017 Details    Sun Mon Tue Wed Thu Fri Sat          1      6 mg         2      6 mg           3      6 mg         4      6 mg         5      6 mg         6      6 mg         7      6 mg         8      6 mg         9      6 mg           10      6 mg         11      6 mg         12      6 mg         13      6 mg         14      6 mg         15      6 mg         16      6 mg           17      6 mg         18      6 mg         19               20               21               22               23                 24               25               26               27               28               29               30                Date Details   No additional details    Date of next INR:  9/18/2017         How to take your warfarin dose     To take:  6 mg Take two of the 3 mg tablets.             Description          Continue same Warfarin dose and recheck in 8 weeks.  Saba Honeycutt RN   7/6/2017    10:10 AM                   Anticoagulation Summary as of 7/6/2017     INR goal 2.0-3.0    Today's INR 1.9    Next INR check 9/18/2017          Call your Anticoagulation Clinic at Dept: [phone]   if:   1. Any medications are started, stopped, or there is a change in dose.  2. You experience any bleeding that is not easily stopped or if it is recurrent.  3. You notice an increase in bruising or any bruising that does not heal.  4. You are scheduled for surgery, colonoscopy, dental extraction or any other procedure where you may need to stop your Coumadin (warfarin).

## 2017-12-29 NOTE — PATIENT INSTRUCTIONS
Patient Information     Patient Name MRN Angel Whitten [id number] Male 1945      Patient Instructions by Izabella Verma RN at 2017 10:45 AM     Author:  Izabella Verma RN Service:  (none) Author Type:  NURS- Registered Nurse     Filed:  2017 11:09 AM Encounter Date:  2017 Status:  Signed     :  Izabella Verma RN (NURS- Registered Nurse)            2017 Details    Sun Mon Tue Wed Thu Fri Sat          1               2                 3               4               5               6               7               8               9                 10               11               12               13               14               15               16                 17               18               19               20               21               22               23                 24               25               26               27               28      6 mg   See details      29      6 mg         30      9 mg          Date Details    This INR check               How to take your warfarin dose     To take:  6 mg Take two of the 3 mg tablets.    To take:  9 mg Take three of the 3 mg tablets.           2017 Details    Sun Mon Tue Wed Thu Fri Sat     1      6 mg         2      6 mg         3      6 mg         4      6 mg         5      6 mg         6      6 mg         7      9 mg           8      6 mg         9      6 mg         10      6 mg         11      6 mg         12      6 mg         13      6 mg         14      9 mg           15      6 mg         16      6 mg         17               18               19               20               21                 22               23               24               25               26               27               28                 29               30               31                    Date Details   No additional details    Date of next INR:  10/16/2017         How to take your warfarin dose     To  take:  6 mg Take two of the 3 mg tablets.    To take:  9 mg Take three of the 3 mg tablets.             Description          Having oral surgery tomorrow, will take 6 mg the next two days (unless surgeon tells him differently.  Then increase Warfarin dose to 9 mg x one day/week, and 6 mg x six days/week.  Recheck INR in two weeks.  DAT BRAND RN ....................  9/28/2017   11:09 AM                  Anticoagulation Summary as of 9/28/2017     INR goal 2.0-3.0    Today's INR 1.5!    Next INR check 10/16/2017          Call your Anticoagulation Clinic at Dept: [phone]   if:   1. Any medications are started, stopped, or there is a change in dose.  2. You experience any bleeding that is not easily stopped or if it is recurrent.  3. You notice an increase in bruising or any bruising that does not heal.  4. You are scheduled for surgery, colonoscopy, dental extraction or any other procedure where you may need to stop your Coumadin (warfarin).

## 2017-12-29 NOTE — PATIENT INSTRUCTIONS
Patient Information     Patient Name MRN Angel Whitten [id number] Male 1945      Patient Instructions by Izabella Verma RN at 10/16/2017 10:15 AM     Author:  Izabella Verma RN Service:  (none) Author Type:  NURS- Registered Nurse     Filed:  10/16/2017 10:22 AM Encounter Date:  10/16/2017 Status:  Signed     :  Izabella Verma RN (NURS- Registered Nurse)            2017 Details    Sun Mon Tue Wed Thu Fri Sat     1               2               3               4               5               6               7                 8               9               10               11               12               13               14                 15               16      6 mg   See details      17      6 mg         18      6 mg         19      6 mg         20      6 mg         21      9 mg           22      6 mg         23      6 mg         24      6 mg         25      6 mg         26      6 mg         27      6 mg         28      9 mg           29      6 mg         30      6 mg         31      6 mg              Date Details   10/16 This INR check               How to take your warfarin dose     To take:  6 mg Take two of the 3 mg tablets.    To take:  9 mg Take three of the 3 mg tablets.           2017 Details    Sun Mon Tue Wed Thu Fri Sat        1      6 mg         2               3               4                 5               6               7               8               9               10               11                 12               13               14               15               16               17               18                 19               20               21               22               23               24               25                 26               27               28               29               30                  Date Details   No additional details    Date of next INR:  2017         How to take your warfarin dose     To take:  6  mg Take two of the 3 mg tablets.             Description          Continue same dose and recheck in 2-3 weeks. DAT BRAND RN    10/16/2017    10:22 AM    Having oral surgery 11/03/17.    Will check INR on 11/01/17 with results to oral surgeon [phone].                  Anticoagulation Summary as of 10/16/2017     INR goal 2.0-3.0    Today's INR 2.4    Next INR check 11/1/2017          Call your Anticoagulation Clinic at Dept: [phone]   if:   1. Any medications are started, stopped, or there is a change in dose.  2. You experience any bleeding that is not easily stopped or if it is recurrent.  3. You notice an increase in bruising or any bruising that does not heal.  4. You are scheduled for surgery, colonoscopy, dental extraction or any other procedure where you may need to stop your Coumadin (warfarin).

## 2017-12-29 NOTE — PATIENT INSTRUCTIONS
Patient Information     Patient Name MRN Angel Whitten [id number] Male 1945      Patient Instructions by Izabella Verma RN at 2017 12:15 PM     Author:  Izabella Verma RN Service:  (none) Author Type:  NURS- Registered Nurse     Filed:  2017 11:46 AM Encounter Date:  2017 Status:  Signed     :  Izabella Verma RN (NURS- Registered Nurse)            2017 Details    Sun Mon Tue Wed Thu Fri Sat         1               2               3                 4               5               6               7               8      6 mg   See details      9      6 mg         10      6 mg           11      6 mg         12      6 mg         13      6 mg         14      6 mg         15      6 mg         16      6 mg         17      6 mg           18      6 mg         19      6 mg         20      6 mg         21      6 mg         22      6 mg         23      6 mg         24      6 mg           25      6 mg         26      6 mg         27      6 mg         28      6 mg         29      6 mg         30      6 mg           Date Details    This INR check               How to take your warfarin dose     To take:  6 mg Take two of the 3 mg tablets.           2017 Details    Sun Mon Tue Wed Thu Fri Sat           1      6 mg           2      6 mg         3      6 mg         4      6 mg         5      6 mg         6      6 mg         7               8                 9               10               11               12               13               14               15                 16               17               18               19               20               21               22                 23               24               25               26               27               28               29                 30               31                     Date Details   No additional details    Date of next INR:  2017         How to take your warfarin dose     To take:  6 mg  Take two of the 3 mg tablets.             Description          Continue same dose and recheck in 4 weeks.  DAT BRAND RN ....................  6/8/2017   11:45 AM                  Anticoagulation Summary as of 6/8/2017     INR goal 2.0-3.0    Today's INR 2.1    Next INR check 7/6/2017          Call your Anticoagulation Clinic at Dept: [phone]   if:   1. Any medications are started, stopped, or there is a change in dose.  2. You experience any bleeding that is not easily stopped or if it is recurrent.  3. You notice an increase in bruising or any bruising that does not heal.  4. You are scheduled for surgery, colonoscopy, dental extraction or any other procedure where you may need to stop your Coumadin (warfarin).

## 2017-12-30 NOTE — NURSING NOTE
Patient Information     Patient Name MRN Angel Whitten [id number] Male 1945      Nursing Note by Breanna Thakur at 2017  9:45 AM     Author:  Breanna Thakur Service:  (none) Author Type:  (none)     Filed:  2017  9:59 AM Encounter Date:  2017 Status:  Signed     :  Breanna Thakur            Patient presents today as a follow-up on his [place] visit.  Breanna Thakur LPN  2017  9:47 AM

## 2018-01-03 NOTE — TELEPHONE ENCOUNTER
Patient Information     Patient Name MRN Sex Angel Pisano [id number] Male 1945      Telephone Encounter by Saba Honeycutt RN at 2017 10:48 AM     Author:  Saba Honeycutt RN Service:  (none) Author Type:  NURS- Registered Nurse     Filed:  2017 10:53 AM Encounter Date:  2017 Status:  Signed     :  Saba Honeycutt RN (NURS- Registered Nurse)            Anticoagulant    Office visit in the past 12 months.    Last visit with MANOHAR KAMARA was on: 2016 in [facility]  Next visit with MANOHAR KAMARA is on: No future appointment listed with this provider  Next visit with Family Practice is on: No future appointment listed in this department    Lab tests:  PT/INR at least monthly    INR (no units)    Date Value   2016 2.0 (H)     HEMOGLOBIN                (g/dL)    Date Value   2015 14.8       Note put in for next [facility] visit patient needs CBC done.  Prescription refilled per RN Medication Refill Policy.................... Saba Honeycutt RN ....................  2017   10:49 AM

## 2018-01-04 NOTE — PATIENT INSTRUCTIONS
Patient Information     Patient Name MRAngel Zaldivar [id number] Male 1945      Patient Instructions by Saba Honeycutt RN at 2017  1:30 PM     Author:  Saba Honeycutt RN Service:  (none) Author Type:  NURS- Registered Nurse     Filed:  2017  2:17 PM Encounter Date:  2017 Status:  Signed     :  Saba Honeycutt RN (NURS- Registered Nurse)            May 2017 Details    Sun Mon Tue Wed Thu Fri Sat      1      6 mg   See details      2      6 mg         3      6 mg         4      6 mg         5      6 mg         6      6 mg           7      6 mg         8      6 mg         9      6 mg         10      6 mg         11      6 mg         12      6 mg         13      6 mg           14      6 mg         15      6 mg         16      6 mg         17      6 mg         18      6 mg         19      6 mg         20      6 mg           21      6 mg         22      6 mg         23      6 mg         24      6 mg         25      6 mg         26      6 mg         27      6 mg           28      6 mg         29      6 mg         30      6 mg         31      6 mg             Date Details    This INR check               How to take your warfarin dose     To take:  6 mg Take two of the 3 mg tablets.           2017 Details    Sun Mon Tue Wed Thu Fri Sat         1      6 mg         2      6 mg         3      6 mg           4      6 mg         5      6 mg         6      6 mg         7      6 mg         8      6 mg         9      6 mg         10      6 mg           11      6 mg         12      6 mg         13      6 mg         14      6 mg         15      6 mg         16      6 mg         17      6 mg           18      6 mg         19      6 mg         20      6 mg         21      6 mg         22      6 mg         23      6 mg         24      6 mg           25      6 mg         26      6 mg         27               28               29               30                 Date Details   No  additional details    Date of next INR:  6/26/2017         How to take your warfarin dose     To take:  6 mg Take two of the 3 mg tablets.             Description          Continue same Warfarin dose and recheck in 8 weeks. Saba Honeycutt RN    5/1/2017  2:16 PM                Anticoagulation Summary as of 5/1/2017     INR goal 2.0-3.0    Today's INR 2.0    Next INR check 6/26/2017          Call your Anticoagulation Clinic at Dept: [phone]   if:   1. Any medications are started, stopped, or there is a change in dose.  2. You experience any bleeding that is not easily stopped or if it is recurrent.  3. You notice an increase in bruising or any bruising that does not heal.  4. You are scheduled for surgery, colonoscopy, dental extraction or any other procedure where you may need to stop your Coumadin (warfarin).

## 2018-01-05 NOTE — PATIENT INSTRUCTIONS
Patient Information     Patient Name MRN Angel Whitten [id number] Male 1945      Patient Instructions by Saba Honeycutt RN at 2017  3:00 PM     Author:  Saba Honeycutt RN Service:  (none) Author Type:  NURS- Registered Nurse     Filed:  2017  3:07 PM Encounter Date:  2017 Status:  Signed     :  Saba Honeycutt RN (NURS- Registered Nurse)            May 2017 Details    Sun  Sat      1               2               3               4               5               6                 7               8               9               10               11               12               13                 14               15               16               17               18               19      3 mg   See details      20      3 mg           21      3 mg         22      6 mg         23               24               25               26               27                 28               29               30               31                   Date Details    This INR check       Date of next INR:  2017         How to take your warfarin dose     To take:  3 mg Take one of the 3 mg tablets.    To take:  6 mg Take two of the 3 mg tablets.             Description          Take 3 mg x 3 days and recheck on 17. Saba Honeycutt RN    2017  3:07 PM                  Anticoagulation Summary as of 2017     INR goal 2.0-3.0    Today's INR 1.4    Next INR check 2017          Call your Anticoagulation Clinic at Dept: [phone]   if:   1. Any medications are started, stopped, or there is a change in dose.  2. You experience any bleeding that is not easily stopped or if it is recurrent.  3. You notice an increase in bruising or any bruising that does not heal.  4. You are scheduled for surgery, colonoscopy, dental extraction or any other procedure where you may need to stop your Coumadin (warfarin).

## 2018-01-05 NOTE — PATIENT INSTRUCTIONS
Patient Information     Patient Name MRN Angel Whitten [id number] Male 1945      Patient Instructions by Krystle Goins RN at 2017 12:45 PM     Author:  Krystle Goins RN Service:  (none) Author Type:  NURS- Registered Nurse     Filed:  2017 10:02 AM Encounter Date:  2017 Status:  Signed     :  Krystle Goins RN (NURS- Registered Nurse)            May 2017 Details    Sun Mon Tue Wed Thu Fri Sat      1               2               3               4               5               6                 7               8               9               10               11               12               13                 14               15               16               17               18               19               20                 21               22               23               24               25               26               27                 28               29               30               31      6 mg   See details          Date Details    This INR check               How to take your warfarin dose     To take:  6 mg Take two of the 3 mg tablets.           2017 Details    Sun Mon Tue Wed Thu Fri Sat         1      6 mg         2      6 mg         3      6 mg           4      6 mg         5      6 mg         6      6 mg         7      6 mg         8      6 mg         9               10                 11               12               13               14               15               16               17                 18               19               20               21               22               23               24                 25               26               27               28               29               30                 Date Details   No additional details    Date of next INR:  2017         How to take your warfarin dose     To take:  6 mg Take two of the 3 mg tablets.             Description          Continue same dose  and recheck in 1 week 6/8/17. Krystle Goins RN    5/31/2017    10:01 AM                      Anticoagulation Summary as of 5/31/2017     INR goal 2.0-3.0    Today's INR 1.7    Next INR check 6/8/2017          Call your Anticoagulation Clinic at Dept: [phone]   if:   1. Any medications are started, stopped, or there is a change in dose.  2. You experience any bleeding that is not easily stopped or if it is recurrent.  3. You notice an increase in bruising or any bruising that does not heal.  4. You are scheduled for surgery, colonoscopy, dental extraction or any other procedure where you may need to stop your Coumadin (warfarin).

## 2018-01-05 NOTE — PATIENT INSTRUCTIONS
Patient Information     Patient Name MRN Angel Whitten [id number] Male 1945      Patient Instructions by Saba Honeycutt RN at 2017  2:00 PM     Author:  Saba Honeycutt RN Service:  (none) Author Type:  NURS- Registered Nurse     Filed:  2017 10:22 AM Encounter Date:  2017 Status:  Signed     :  Saba Honeycutt RN (NURS- Registered Nurse)            May 2017 Details    Sun  Fri Sat      1               2               3               4               5               6                 7               8               9               10               11               12               13                 14               15               16               17               18               19               20                 21               22      6 mg   See details      23      6 mg         24      6 mg         25      6 mg         26      6 mg         27      6 mg           28      6 mg         29      6 mg         30      6 mg         31      6 mg             Date Details    This INR check       Date of next INR:  2017         How to take your warfarin dose     To take:  6 mg Take two of the 3 mg tablets.             Description          Resume normal dose of 6 mg daily and recheck on 17. Saba Honeycutt RN    2017  10:21 AM                    Anticoagulation Summary as of 2017     INR goal 2.0-3.0    Today's INR 1.3    Next INR check 2017          Call your Anticoagulation Clinic at Dept: [phone]   if:   1. Any medications are started, stopped, or there is a change in dose.  2. You experience any bleeding that is not easily stopped or if it is recurrent.  3. You notice an increase in bruising or any bruising that does not heal.  4. You are scheduled for surgery, colonoscopy, dental extraction or any other procedure where you may need to stop your Coumadin (warfarin).

## 2018-01-05 NOTE — PROGRESS NOTES
Patient Information     Patient Name MRN Sex Angel Pisano [id number] Male 1945      Progress Notes by Noman Miles MD at 2017 10:15 AM     Author:  Noman Miles MD Service:  (none) Author Type:  Physician     Filed:  2017 10:37 AM Encounter Date:  2017 Status:  Signed     :  Noman Miles MD (Physician)            SUBJECTIVE:  Angel Garrett is a 71 y.o. male who presents for follow-up of a left-sided peritonsillar abscess. He was in the hospital on IV clindamycin for 2 days and discharged on May 17. He's finished courses of oral clindamycin and Decadron and saw ENT last week. Reports that everything seems back to normal. He denies pain in the throat or swollen glands. The tooth that was the possible source of this abscess has been extracted and he has dental follow-up scheduled.    No Known Allergies and   Current Outpatient Prescriptions on File Prior to Visit       Medication  Sig Dispense Refill     acetaminophen (TYLENOL EXTRA STRGTH) 500 mg tablet Take 1,000 mg by mouth every 4 hours while awake. Max acetaminophen dose: 4000mg in 24 hrs.       atenolol (TENORMIN) 50 mg tablet Take 1 tablet by mouth once daily. 90 tablet 3     digoxin (LANOXIN) 250 mcg tablet Take 1 tablet by mouth once daily. 90 tablet 3     furosemide (LASIX) 20 mg tablet Take 40 mg by mouth every morning.       GLUCOSAMINE/CHONDROITIN SULF A (GLUCOSAMINE-CHONDROITIN ORAL) Take 2 tablets by mouth once daily.       lisinopril (PRINIVIL; ZESTRIL) 20 mg tablet Take 20 mg by mouth once daily.       omega-3 fatty acids-vitamin E (FISH OIL) 1,000 mg cap Take 2 capsules by mouth once daily.  0     tadalafil, antihypertensive, (ADCIRCA) 20 mg tab Take 40 mg by mouth every morning.       warfarin (COUMADIN) 3 mg tablet Take 6 mg daily 180 tablet 0     No current facility-administered medications on file prior to visit.        OBJECTIVE:  /68  Pulse 52  Wt 93.4 kg (206 lb)  BMI 31.32  kg/m2  EXAM:  General Appearance: Pleasant, alert, appropriate appearance for age. No acute distress  OroPharynx Exam: Tonsils and peritonsillar regions appear normal with no swelling or erythema.  Neck Exam: Supple, no masses or nodes.    ASSESSMENT/Plan :      ICD-10-CM    1. Peritonsillar abscess  Clinically resolved. He will be seen if symptoms redevelop. He will follow-up with his dentist as scheduled.  [id number]        Noman Miles MD

## 2018-01-27 VITALS
HEART RATE: 52 BPM | DIASTOLIC BLOOD PRESSURE: 68 MMHG | SYSTOLIC BLOOD PRESSURE: 122 MMHG | WEIGHT: 206 LBS | BODY MASS INDEX: 31.32 KG/M2

## 2018-01-27 VITALS
SYSTOLIC BLOOD PRESSURE: 112 MMHG | HEART RATE: 52 BPM | BODY MASS INDEX: 30.41 KG/M2 | WEIGHT: 200 LBS | DIASTOLIC BLOOD PRESSURE: 58 MMHG

## 2018-01-27 VITALS
SYSTOLIC BLOOD PRESSURE: 106 MMHG | WEIGHT: 202 LBS | DIASTOLIC BLOOD PRESSURE: 40 MMHG | HEART RATE: 60 BPM | BODY MASS INDEX: 30.71 KG/M2

## 2018-01-30 ENCOUNTER — COMMUNICATION - GICH (OUTPATIENT)
Dept: FAMILY MEDICINE | Facility: OTHER | Age: 73
End: 2018-01-30

## 2018-01-30 DIAGNOSIS — Z79.01 LONG TERM CURRENT USE OF ANTICOAGULANT: ICD-10-CM

## 2018-01-30 DIAGNOSIS — M25.551 PAIN IN RIGHT HIP: ICD-10-CM

## 2018-01-30 DIAGNOSIS — I48.20 CHRONIC ATRIAL FIBRILLATION (H): ICD-10-CM

## 2018-02-01 ENCOUNTER — TRANSFERRED RECORDS (OUTPATIENT)
Dept: HEALTH INFORMATION MANAGEMENT | Facility: OTHER | Age: 73
End: 2018-02-01

## 2018-02-01 ENCOUNTER — AMBULATORY - GICH (OUTPATIENT)
Dept: SCHEDULING | Facility: OTHER | Age: 73
End: 2018-02-01

## 2018-02-05 ENCOUNTER — COMMUNICATION - GICH (OUTPATIENT)
Dept: FAMILY MEDICINE | Facility: OTHER | Age: 73
End: 2018-02-05

## 2018-02-09 VITALS
DIASTOLIC BLOOD PRESSURE: 68 MMHG | HEART RATE: 60 BPM | BODY MASS INDEX: 32.65 KG/M2 | WEIGHT: 208 LBS | TEMPERATURE: 98.3 F | HEIGHT: 67 IN | SYSTOLIC BLOOD PRESSURE: 136 MMHG

## 2018-02-11 PROBLEM — Z79.01 LONG TERM (CURRENT) USE OF ANTICOAGULANTS: Status: ACTIVE | Noted: 2018-02-11

## 2018-02-11 PROBLEM — I48.2 CHRONIC ATRIAL FIBRILLATION: Status: ACTIVE | Noted: 2018-02-11

## 2018-02-12 NOTE — TELEPHONE ENCOUNTER
Patient Information     Patient Name MRN Sex Angel Pisano [id number] Male 1945      Telephone Encounter by Krystle Sargent RN at 2017 10:42 AM     Author:  Krystle Sargent RN Service:  (none) Author Type:  NURS- Registered Nurse     Filed:  2017 11:02 AM Encounter Date:  2017 Status:  Signed     :  Krystle Sargent RN (NURS- Registered Nurse)            FYI: This Patient has appointment tomorrow, 17, to establish care with Donte Mckeon MD. I contacted Patient and he said he will not out of this medication, but is due for a refill. As he will be leaving for the winter on , Patient is requesting 90d+ refill at visit tomorrow, as he will be going directly to pharmacy afterwards. Sending to Donte Mckeon MD for consideration, as Patient's most recent Creatinine level on 10/16 is high at 1.56. This is up since last creatinine level on , when it was 1.44.     Per note from pharmacy: Prior authorization needed. The request for authorization will be sent electronically when the order is signed.      PLEASE REVIEW PENDING ORDERS FOR MEDICATION AND LAB ORDER FOR CREATININE AND SIGN AND SEND AS APPROPRIATE.    This is a Refill request from: [facility]  Name of Medication: digoxin (Lanoxin)  Quantity requested: 90 tabs  Last fill date: 2016  Due for refill: Yes  Last visit with MANOHAR KAMARA was on: 10/16/2017 in [facility]  PCP:  Manohar Kamara MD  Controlled Substance Agreement:  None for this medication   Diagnosis r/t this medication request: Chronic A-fib    Lab test requirements:  Annual Digoxin level and creatinine if stable  DIGOXIN (ng/mL)    Date Value   2017 0.6 (L)     CREATININE (mg/dL)    Date Value   10/16/2017 1.56 (H)     Max refills 6 months from last office visit.     Unable to complete prescription refill per RN Medication Refill Policy.................... Krystle Sargent RN ....................  2017   10:52  AM

## 2018-02-12 NOTE — PATIENT INSTRUCTIONS
Patient Information     Patient Name MRAngel Zaldivar [id number] Male 1945      Patient Instructions by Saba Honeycutt RN at 2017  2:00 PM     Author:  Saba Honeycutt RN Service:  (none) Author Type:  NURS- Registered Nurse     Filed:  2017  1:00 PM Encounter Date:  2017 Status:  Signed     :  Saba Honeycutt RN (NURS- Registered Nurse)            2017 Details    Sun Mon Tue Wed Thu Fri Sat          1               2                 3               4      6 mg   See details      5      6 mg         6      6 mg         7      6 mg         8      6 mg         9      9 mg           10      6 mg         11      6 mg         12      6 mg         13      6 mg         14      6 mg         15      6 mg         16      9 mg           17      6 mg         18      6 mg         19      6 mg         20      6 mg         21      6 mg         22      6 mg         23      9 mg           24      6 mg         25      6 mg         26      6 mg         27      6 mg         28      6 mg         29      6 mg         30      9 mg           31      6 mg                Date Details    This INR check               How to take your warfarin dose     To take:  6 mg Take two of the 3 mg tablets.    To take:  9 mg Take three of the 3 mg tablets.           2018 Details    Sun Mon Tue Wed Thu Fri Sat      1      6 mg         2      6 mg         3      6 mg         4      6 mg         5      6 mg         6      9 mg           7      6 mg         8      6 mg         9               10               11               12               13                 14               15               16               17               18               19               20                 21               22               23               24               25               26               27                 28               29               30               31                   Date Details   No  additional details    Date of next INR:  1/8/2018         How to take your warfarin dose     To take:  6 mg Take two of the 3 mg tablets.    To take:  9 mg Take three of the 3 mg tablets.             Description          Continue same Warfarin dose and recheck in 1 month.  Saba Honeycutt RN   12/4/2017    12:59 PM    Will be leaving [facility] 12/13/17              Anticoagulation Summary as of 12/4/2017     INR goal 2.0-3.0    Today's INR 2.2    Next INR check 1/8/2018          Call your Anticoagulation Clinic at Dept: [phone]   if:   1. Any medications are started, stopped, or there is a change in dose.  2. You experience any bleeding that is not easily stopped or if it is recurrent.  3. You notice an increase in bruising or any bruising that does not heal.  4. You are scheduled for surgery, colonoscopy, dental extraction or any other procedure where you may need to stop your Coumadin (warfarin).

## 2018-02-12 NOTE — NURSING NOTE
Patient Information     Patient Name MRN Sex Angel Pisano [id number] Male 1945      Nursing Note by Susan Major at 2017  8:45 AM     Author:  Susan Major Service:  (none) Author Type:  (none)     Filed:  2017  8:56 AM Encounter Date:  2017 Status:  Signed     :  Susan Major            Patient presents today for annual physical, and to possibly establish care. Patient did fast today.    Susan Major LPN..............2017 8:28 AM

## 2018-02-12 NOTE — PROGRESS NOTES
Patient Information     Patient Name MRN Sex     Angel Garrett [id number] Male 1945      Progress Notes by Donte Mckeon MD at 2017  8:45 AM     Author:  Donte Mckeon MD Service:  (none) Author Type:  Physician     Filed:  2017 11:26 AM Encounter Date:  2017 Status:  Signed     :  Donte Mckeon MD (Physician)            Nursing Notes:   Susan Major  2017  8:56 AM  Signed  Patient presents today for annual physical, and to possibly establish care. Patient did fast today.    Susan JADA [place] LPN..............2017 8:28 AM      SUBJECTIVE:  Angel Garrett  is a 72 y.o. male who comes in today for complete evaluation and to establish care. He previously had seen Dr. Miles. He has pulmonary arterial hypertension, congenital heart disease and chronic atrial fibrillation. He is followed at [facility] and is currently on torsemide. He last saw Dr. Miles in October and on laboratory monitoring was felt to be a little over diuresed and so his torsemide was decreased to 20 mg alternating with 40 mg every other day. He is also on warfarin and is on digoxin and metoprolol for rate control. He is on tadalafil 40 mg daily along with lisinopril 20 mg daily. He seems to be tolerating those medications. He is to follow up in August.     He has been having pain in his right hip. It aches a lot. It bothers walking. He limps a bit. He has less pain at rest. He hasn't taken anything for it. He has other joint pains.     He is up-to-date on health maintenance issues. He [place] colonography [place] . [place]. He has been steady on coumadin dose for a long time.     He had some nocturia few years ago and was seen in urology, but had no issues and was diagnosed with obstructive sleep apnea and is on CPAP and has no issues.     Past Medical, Family, and Social History reviewed and updated as noted below.   ROS is negative except as  noted above       No Known Allergies,   Family History       Problem   Relation Age of Onset     Heart Disease  Mother      Heart Disease  Father      CHF       Other  Father      PUD       Other  Brother      Liver or kidney cancer       Good Health  Sister      Diabetes  Brother      Good Health  Brother      Good Health  Brother      Good Health  Brother    ,   Current Outpatient Prescriptions on File Prior to Visit       Medication  Sig Dispense Refill     acetaminophen (TYLENOL EXTRA STRGTH) 500 mg tablet Take 1,000 mg by mouth every 4 hours while awake. Max acetaminophen dose: 4000mg in 24 hrs.       digoxin (LANOXIN) 250 mcg tablet TAKE 1 TABLET BY MOUTH ONCE DAILY. 90 tablet prn     GLUCOSAMINE/CHONDROITIN SULF A (GLUCOSAMINE-CHONDROITIN ORAL) Take 2 tablets by mouth once daily.       omega-3 fatty acids-vitamin E (FISH OIL) 1,000 mg cap Take 2 capsules by mouth once daily.  0     tadalafil, antihypertensive, (ADCIRCA) 20 mg tab Take 40 mg by mouth every morning.       No current facility-administered medications on file prior to visit.    , No past medical history on file.,   Patient Active Problem List      Diagnosis Date Noted     Peritonsillar abscess 05/15/2017     Pulmonary hypertension 05/15/2017     Obstructive sleep apnea 11/28/2016     Anticoagulation monitoring, INR range 2-3 06/10/2015     Chronic atrial fibrillation (HC) 12/05/2013     Hypertension 12/05/2013     Hyperlipemia 12/05/2013     Prediabetes 12/05/2013     Hx of congenital heart disease 12/05/2013     Obesity 12/05/2013   ,   Past Surgical History:      Procedure  Laterality Date     CARDIAC CATHETERIZATION  2016     CARDIAC SURGERY  2002    Pericardial patch, closure of ASD, repair of PFO and repair of anomalous pulmonary venous drainage.       COLONOSCOPY SCREENING  ~2009    Colonography       OPEN CHOLECYSTECTOMY  2008     VASECTOMY      and   Social History        Substance Use Topics          Smoking status:   Former Smoker       "Quit date:  9/17/1984      Smokeless tobacco:   Never Used      Alcohol use   3.5 oz/week     7 Standard drinks or equivalent per week        Comment: 1-2 drinks a day       OBJECTIVE:  /68  Pulse 60  Temp 98.3  F (36.8  C) (Oral)   Ht 1.689 m (5' 6.5\")  Wt 94.3 kg (208 lb)  BMI 33.07 kg/m2 3  EXAM:  General Appearance: Pleasant, alert, appropriate appearance for age. No acute distress  Head Exam: Normal. Normocephalic, atraumatic.  Eye Exam:  Normal external eye, conjunctiva, lids, cornea. PRICE. EOMI  Ear Exam: Normal TM's bilaterally. Normal auditory canals and external ears. Non-tender.  Nose Exam: Normal external nose, mucus membranes, and septum.  OroPharynx Exam:  Dental hygiene adequate. Normal buccal mucosa. Normal pharynx.  Neck Exam:  Supple, no masses or nodes. No audible bruits  Thyroid Exam: No nodules or enlargement.  Chest/Respiratory Exam: Well-healed midline sternotomy scar and normal respirations. Clear to auscultation.  Cardiovascular Exam: Regular rate and rhythm. S1, S2, no murmur, click, gallop, or rubs.  Gastrointestinal Exam: Soft, non-tender, no masses or organomegaly.  Lymphatic Exam: Non-palpable nodes in neck, clavicular regions.  Musculoskeletal Exam: Back is straight and non-tender, full ROM of upper and lower extremities.  Skin: no rash or abnormalities  Neurologic Exam: Nonfocal, normal gross motor, tone coordination and no tremor.  Psychiatric Exam: Alert and oriented - appropriate affect.   Results for orders placed or performed in visit on 12/06/17      COMP METABOLIC PANEL      Result  Value Ref Range    SODIUM 140 133 - 143 mmol/L    POTASSIUM 3.9 3.5 - 5.1 mmol/L    CHLORIDE 103 98 - 107 mmol/L    CO2,TOTAL 28 21 - 31 mmol/L    ANION GAP 9 5 - 18                    GLUCOSE 106 (H) 70 - 105 mg/dL    CALCIUM 9.1 8.6 - 10.3 mg/dL    BUN 38 (H) 7 - 25 mg/dL    CREATININE 1.43 (H) 0.70 - 1.30 mg/dL    BUN/CREAT RATIO           27                    GFR if  " 59 (L) >60 ml/min/1.73m2    GFR if not  48 (L) >60 ml/min/1.73m2    ALBUMIN 4.1 3.5 - 5.7 g/dL    PROTEIN,TOTAL 7.6 6.4 - 8.9 g/dL    GLOBULIN                  3.5 2.0 - 3.7 g/dL    A/G RATIO 1.2 1.0 - 2.0                    BILIRUBIN,TOTAL 0.9 0.3 - 1.0 mg/dL    ALK PHOSPHATASE 74 34 - 104 IU/L    ALT (SGPT) 13 7 - 52 IU/L    AST (SGOT) 20 13 - 39 IU/L   LIPID PANEL      Result  Value Ref Range    CHOLESTEROL,TOTAL 152 <200 mg/dL    TRIGLYCERIDES 84 <150 mg/dL    HDL CHOLESTEROL 61 23 - 92 mg/dL    NON-HDL CHOLESTEROL 91 <145 mg/dl    CHOL/HDL RATIO            2.49 <4.50                    LDL CHOLESTEROL 74 <100 mg/dL    PROVIDER ORDERED STATUS RANDOM    PSA TOTAL (DIAGNOSTIC)      Result  Value Ref Range    PSA TOTAL (DIAGNOSTIC) 3.760 (H) <=3.100 ng/mL   HEMOGLOBIN A1C MONITORING (POCT)      Result  Value Ref Range    HEMOGLOBIN A1C MONITORING (POCT) 5.2 4.0 - 6.2 %    ESTIMATED AVERAGE GLUCOSE  103 mg/dL   DIGOXIN      Result  Value Ref Range    DIGOXIN 0.9 0.8 - 2.0 ng/mL    DATE OF LAST DOSE         12/6/2017                    TIME OF LAST DOSE          6:47 AM                   CBC WITH AUTO DIFFERENTIAL      Result  Value Ref Range    WHITE BLOOD COUNT         5.4 4.5 - 11.0 thou/cu mm    RED BLOOD COUNT           3.75 (L) 4.30 - 5.90 mil/cu mm    HEMOGLOBIN                13.7 13.5 - 17.5 g/dL    HEMATOCRIT                39.9 37.0 - 53.0 %    MCV                       106 (H) 80 - 100 fL    MCH                       36.5 (H) 26.0 - 34.0 pg    MCHC                      34.3 32.0 - 36.0 g/dL    RDW                       12.7 11.5 - 15.5 %    PLATELET COUNT            147 140 - 440 thou/cu mm    MPV                       9.7 6.5 - 11.0 fL    NEUTROPHILS               54.7 42.0 - 72.0 %    LYMPHOCYTES               33.8 20.0 - 44.0 %    MONOCYTES                 8.5 <12.0 %    EOSINOPHILS               2.4 <8.0 %    BASOPHILS                 0.4 <3.0 %    IMMATURE  GRANULOCYTES(METAS,MYELOS,PROS) 0.2 %    ABSOLUTE NEUTROPHILS      3.0 1.7 - 7.0 thou/cu mm    ABSOLUTE LYMPHOCYTES      1.8 0.9 - 2.9 thou/cu mm    ABSOLUTE MONOCYTES        0.5 <0.9 thou/cu mm    ABSOLUTE EOSINOPHILS      0.1 <0.5 thou/cu mm    ABSOLUTE BASOPHILS        0.0 <0.3 thou/cu mm    ABSOLUTE IMMATURE GRANULOCYTES(METAS,MYELOS,PROS) 0.0 <=0.3 thou/cu mm      ASSESSMENT/Plan :      Angel was seen today for medication management.    Diagnoses and all orders for this visit:    Chronic atrial fibrillation (HC)  -     CBC AND DIFFERENTIAL; Future  -     DIGOXIN; Future  -     torsemide (DEMADEX) 20 mg tablet; Take one tablet daily one day and 2 on the opposite day to alternate every other day.  -     warfarin (COUMADIN) 3 mg tablet; Take 9 mg x one day/week, and 6 mg x six days/week.  -     metoprolol succinate (TOPROL XL) 50 mg sustained-release tablet; Take 50 mg by mouth once daily.  -     CBC AND DIFFERENTIAL  -     DIGOXIN  -     CBC WITH AUTO DIFFERENTIAL    Hypertension  -     COMP METABOLIC PANEL; Future  -     lisinopril (PRINIVIL; ZESTRIL) 20 mg tablet; Take 1 tablet by mouth once daily.  -     metoprolol succinate (TOPROL XL) 50 mg sustained-release tablet; Take 50 mg by mouth once daily.  -     COMP METABOLIC PANEL    Hyperlipidemia, unspecified hyperlipidemia type  -     LIPID PANEL; Future  -     LIPID PANEL    Prediabetes  -     HEMOGLOBIN A1C MONITORING (POCT); Future  -     HEMOGLOBIN A1C MONITORING (POCT)    Hx of congenital heart disease    Anticoagulation monitoring, INR range 2-3  -     CBC AND DIFFERENTIAL; Future  -     warfarin (COUMADIN) 3 mg tablet; Take 9 mg x one day/week, and 6 mg x six days/week.  -     CBC AND DIFFERENTIAL  -     CBC WITH AUTO DIFFERENTIAL    Obstructive sleep apnea    Pulmonary hypertension    Prostate disorder  -     PSA TOTAL (DIAGNOSTIC); Future  -     PSA TOTAL (DIAGNOSTIC)    Right hip pain      Will notify of lab results when available. Discussed diet,  exercise and healthy lifestyle changes. Continue current medications.     Recommend repeat PSA in a year.     A total of 40 minutes was spent with the patient, greater than 50% of the time was spent in counseling/discussion of the aforementioned concerns.           Donte Mckeon MD

## 2018-02-13 ENCOUNTER — TELEPHONE (OUTPATIENT)
Dept: FAMILY MEDICINE | Facility: OTHER | Age: 73
End: 2018-02-13

## 2018-02-13 NOTE — TELEPHONE ENCOUNTER
Patient Information     Patient Name MRN Sex Angel Pisano [id number] Male 1945      Telephone Encounter by Cyndee Berry at 2018  2:53 PM     Author:  Cyndee Berry Service:  (none) Author Type:  (none)     Filed:  2018  2:58 PM Encounter Date:  2018 Status:  Signed     :  Cyndee Berry            1. The patient would like a referral to [facility] in [place] for his right hip pain. Dr Demario Green. 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 He would like to get the ball rolling now so when he comes back form [place] he can go to that appointment. He states he has talked with you about his hip pain before.   2. He would like an order for his INR sent to [facility] in [facility]. Fax [phone].  Cyndee Berry LPN..................2018   2:56 PM

## 2018-02-13 NOTE — TELEPHONE ENCOUNTER
He is looking for his INR results. I put it in your in box and it was 2.2. He is aware of this.  Cyndee Berry LPN..................2/13/2018   3:15 PM

## 2018-02-13 NOTE — TELEPHONE ENCOUNTER
Patient Information     Patient Name MRN Sex Angel Pisano [id number] Male 1945      Telephone Encounter by Donte Mckeon MD at 2018  5:15 PM     Author:  Donte Mckeon MD Service:  (none) Author Type:  Physician     Filed:  2018  5:15 PM Encounter Date:  2018 Status:  Signed     :  Donte Mckeon MD (Physician)            Done  Donte Mckeon MD ....................  2018   5:15 PM

## 2018-02-13 NOTE — TELEPHONE ENCOUNTER
Patient Information     Patient Name MRAngel Zaldivar [id number] Male 1945      Telephone Encounter by Cyndee Berry at 2018  3:46 PM     Author:  Cyndee Berry Service:  (none) Author Type:  (none)     Filed:  2018  3:47 PM Encounter Date:  2018 Status:  Signed     :  Cyndee Berry            The patient was looking for the results of her INR that was being faxed from [facility]. I told him we have not received it yet. He said he will call them and have them fax it to Donte Mckeon MD.  Cyndee Berry LPN..................2018   3:47 PM

## 2018-02-13 NOTE — TELEPHONE ENCOUNTER
Patient Information     Patient Name MRN Angel Whitten [id number] Male 1945      Telephone Encounter by Cyndee Berry at 2018  9:03 AM     Author:  Cyndee Berry Service:  (none) Author Type:  (none)     Filed:  2018  9:03 AM Encounter Date:  2018 Status:  Signed     :  Cyndee Berry            After the patient's name and date of birth was verified, the patient was told the below information.  Cyndee Berry LPJEREMI..................2018   9:03 AM

## 2018-02-14 NOTE — TELEPHONE ENCOUNTER
After the patient'sname and date of birth was verified, the patient was told the below information.  Cyndee Berry LPN..................2/14/2018   10:41 AM

## 2018-03-09 ENCOUNTER — DOCUMENTATION ONLY (OUTPATIENT)
Dept: FAMILY MEDICINE | Facility: OTHER | Age: 73
End: 2018-03-09

## 2018-03-09 PROBLEM — Z79.01 ANTICOAGULATION MONITORING, INR RANGE 2-3: Status: ACTIVE | Noted: 2018-02-11

## 2018-03-09 PROBLEM — J36 PERITONSILLAR ABSCESS: Status: ACTIVE | Noted: 2017-05-15

## 2018-03-09 PROBLEM — I27.20 PULMONARY HYPERTENSION (H): Status: ACTIVE | Noted: 2017-05-15

## 2018-03-09 PROBLEM — I48.20 CHRONIC ATRIAL FIBRILLATION (H): Status: ACTIVE | Noted: 2018-02-11

## 2018-03-09 RX ORDER — DIGOXIN 250 MCG
250 TABLET ORAL DAILY
COMMUNITY
Start: 2017-12-05 | End: 2018-11-29

## 2018-03-09 RX ORDER — METOPROLOL SUCCINATE 50 MG/1
TABLET, EXTENDED RELEASE ORAL
COMMUNITY
Start: 2017-12-06 | End: 2018-12-05

## 2018-03-09 RX ORDER — LISINOPRIL 20 MG/1
20 TABLET ORAL DAILY
COMMUNITY
Start: 2017-12-06 | End: 2018-10-09

## 2018-03-09 RX ORDER — TORSEMIDE 20 MG/1
TABLET ORAL
COMMUNITY
Start: 2017-12-06 | End: 2018-09-05

## 2018-03-09 RX ORDER — WARFARIN SODIUM 3 MG/1
TABLET ORAL
COMMUNITY
Start: 2017-12-06 | End: 2018-04-30

## 2018-04-30 DIAGNOSIS — I48.20 CHRONIC ATRIAL FIBRILLATION (H): Primary | ICD-10-CM

## 2018-04-30 RX ORDER — WARFARIN SODIUM 3 MG/1
TABLET ORAL
Qty: 180 TABLET | Refills: 0 | Status: SHIPPED | OUTPATIENT
Start: 2018-04-30 | End: 2018-07-28

## 2018-04-30 NOTE — TELEPHONE ENCOUNTER
Prescription approved per [facility] Refill Protocol.  Saba Honeycutt RN    4/30/2018  10:03 AM

## 2018-05-08 ENCOUNTER — ANTICOAGULATION THERAPY VISIT (OUTPATIENT)
Dept: ANTICOAGULATION | Facility: OTHER | Age: 73
End: 2018-05-08
Attending: FAMILY MEDICINE
Payer: MEDICARE

## 2018-05-08 DIAGNOSIS — Z79.01 ANTICOAGULATION MONITORING, INR RANGE 2-3: ICD-10-CM

## 2018-05-08 DIAGNOSIS — Z79.01 LONG-TERM (CURRENT) USE OF ANTICOAGULANTS: ICD-10-CM

## 2018-05-08 DIAGNOSIS — I48.20 CHRONIC ATRIAL FIBRILLATION (H): ICD-10-CM

## 2018-05-08 LAB — INR POINT OF CARE: 2.6 (ref 0.86–1.14)

## 2018-05-08 PROCEDURE — 85610 PROTHROMBIN TIME: CPT | Mod: QW,ZL

## 2018-05-08 PROCEDURE — 36416 COLLJ CAPILLARY BLOOD SPEC: CPT | Mod: ZL

## 2018-05-08 NOTE — PROGRESS NOTES
ANTICOAGULATION FOLLOW-UP CLINIC VISIT    Patient Name:  Angel Garrett  Date:  5/8/2018  Contact Type:  Face to Face    SUBJECTIVE:     Patient Findings     Positives No Problem Findings           OBJECTIVE    INR Protime   Date Value Ref Range Status   05/08/2018 2.6 (A) 0.86 - 1.14 Final       ASSESSMENT / PLAN  INR assessment THER    Recheck INR In: 6 WEEKS    INR Location Clinic      Anticoagulation Summary as of 5/8/2018     INR goal 2.0-3.0   Today's INR 2.6   Maintenance plan 9 mg (3 mg x 3) on Sat; 6 mg (3 mg x 2) all other days   Full instructions 9 mg on Sat; 6 mg all other days   Weekly total 45 mg   No change documented Saba Honeycutt, PAIGE   Next INR check 6/19/2018   Target end date Indefinite    Indications   Long-term (current) use of anticoagulants [Z79.01] [Z79.01]  Chronic atrial fibrillation (H) [I48.2]         Anticoagulation Episode Summary     INR check location     Preferred lab     Send INR reminders to  INR    Comments       Anticoagulation Care Providers     Provider Role Specialty Phone number    Donte Mckeon MD [facility] [phone]            See the Encounter Report to view Anticoagulation Flowsheet and Dosing Calendar (Go to Encounters tab in chart review, and find the Anticoagulation Therapy Visit)        Saba Honeycutt, RN

## 2018-05-08 NOTE — MR AVS SNAPSHOT
Angel Garrett   5/8/2018 3:00 PM   Anticoagulation Therapy Visit    Description:  72 year old male   Provider:  [facility]   Department:  [facility]           INR as of 5/8/2018     Today's INR 2.6      Anticoagulation Summary as of 5/8/2018     INR goal 2.0-3.0   Today's INR 2.6   Full instructions 9 mg on Sat; 6 mg all other days   Next INR check 6/19/2018    Indications   Long-term (current) use of anticoagulants [Z79.01] [Z79.01]  Chronic atrial fibrillation (H) [I48.2]         Description     Continue same dose and recheck in 6 weeks. ...............Saba Honeycutt RN    5/8/2018    3:06 PM        May 2018 Details    Sun Mon Tue Wed Thu Fri Sat       1               2               3               4               5                 6               7               8      6 mg   See details      9      6 mg         10      6 mg         11      6 mg         12      9 mg           13      6 mg         14      6 mg         15      6 mg         16      6 mg         17      6 mg         18      6 mg         19      9 mg           20      6 mg         21      6 mg         22      6 mg         23      6 mg         24      6 mg         25      6 mg         26      9 mg           27      6 mg         28      6 mg         29      6 mg         30      6 mg         31      6 mg            Date Details   05/08 This INR check               How to take your warfarin dose     To take:  6 mg Take 2 of the 3 mg tablets.    To take:  9 mg Take 3 of the 3 mg tablets.           June 2018 Details    Sun Mon Tue Wed Thu Fri Sat          1      6 mg         2      9 mg           3      6 mg         4      6 mg         5      6 mg         6      6 mg         7      6 mg         8      6 mg         9      9 mg           10      6 mg         11      6 mg         12      6 mg         13      6 mg         14      6 mg         15      6 mg         16      9 mg           17      6 mg         18      6 mg         19            20                21               22               23                 24               25               26               27               28               29               30                Date Details   No additional details    Date of next INR:  6/19/2018         How to take your warfarin dose     To take:  6 mg Take 2 of the 3 mg tablets.    To take:  9 mg Take 3 of the 3 mg tablets.

## 2018-05-10 ENCOUNTER — APPOINTMENT (OUTPATIENT)
Dept: GENERAL RADIOLOGY | Facility: OTHER | Age: 73
End: 2018-05-10
Attending: FAMILY MEDICINE
Payer: MEDICARE

## 2018-05-10 ENCOUNTER — HOSPITAL ENCOUNTER (OUTPATIENT)
Facility: OTHER | Age: 73
Setting detail: OBSERVATION
Discharge: NURSING, REHAB OR LONG-TERM CARE | End: 2018-05-11
Attending: FAMILY MEDICINE | Admitting: FAMILY MEDICINE
Payer: MEDICARE

## 2018-05-10 ENCOUNTER — APPOINTMENT (OUTPATIENT)
Dept: CT IMAGING | Facility: OTHER | Age: 73
End: 2018-05-10
Attending: FAMILY MEDICINE
Payer: MEDICARE

## 2018-05-10 DIAGNOSIS — S22.41XA CLOSED FRACTURE OF MULTIPLE RIBS OF RIGHT SIDE, INITIAL ENCOUNTER: ICD-10-CM

## 2018-05-10 DIAGNOSIS — Z79.01 LONG TERM CURRENT USE OF ANTICOAGULANT THERAPY: ICD-10-CM
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
## 2018-05-10 LAB
ALBUMIN UR-MCNC: NEGATIVE MG/DL
ANION GAP SERPL CALCULATED.3IONS-SCNC: 6 MMOL/L (ref 3–14)
APPEARANCE UR: CLEAR
BASOPHILS # BLD AUTO: 0 10E9/L (ref 0–0.2)
BASOPHILS NFR BLD AUTO: 0.5 %
BILIRUB UR QL STRIP: NEGATIVE
BUN SERPL-MCNC: 40 MG/DL (ref 7–25)
CALCIUM SERPL-MCNC: 9.4 MG/DL (ref 8.6–10.3)
CHLORIDE SERPL-SCNC: 103 MMOL/L (ref 98–107)
CO2 SERPL-SCNC: 31 MMOL/L (ref 21–31)
COLOR UR AUTO: YELLOW
CREAT SERPL-MCNC: 1.52 MG/DL (ref 0.7–1.3)
DIFFERENTIAL METHOD BLD: ABNORMAL
EOSINOPHIL # BLD AUTO: 0.1 10E9/L (ref 0–0.7)
EOSINOPHIL NFR BLD AUTO: 0.9 %
ERYTHROCYTE [DISTWIDTH] IN BLOOD BY AUTOMATED COUNT: 12.2 % (ref 10–15)
GFR SERPL CREATININE-BSD FRML MDRD: 45 ML/MIN/1.7M2
GLUCOSE SERPL-MCNC: 127 MG/DL (ref 70–105)
GLUCOSE UR STRIP-MCNC: NEGATIVE MG/DL
HCT VFR BLD AUTO: 37.3 % (ref 40–53)
HGB BLD-MCNC: 12.8 G/DL (ref 13.3–17.7)
HGB UR QL STRIP: NEGATIVE
IMM GRANULOCYTES # BLD: 0 10E9/L (ref 0–0.4)
IMM GRANULOCYTES NFR BLD: 0.5 %
INR PPP: 2.37 (ref 0–1.3)
KETONES UR STRIP-MCNC: NEGATIVE MG/DL
LEUKOCYTE ESTERASE UR QL STRIP: NEGATIVE
LYMPHOCYTES # BLD AUTO: 1.6 10E9/L (ref 0.8–5.3)
LYMPHOCYTES NFR BLD AUTO: 18.4 %
MCH RBC QN AUTO: 36.3 PG (ref 26.5–33)
MCHC RBC AUTO-ENTMCNC: 34.3 G/DL (ref 31.5–36.5)
MCV RBC AUTO: 106 FL (ref 78–100)
MONOCYTES # BLD AUTO: 0.5 10E9/L (ref 0–1.3)
MONOCYTES NFR BLD AUTO: 5.4 %
NEUTROPHILS # BLD AUTO: 6.4 10E9/L (ref 1.6–8.3)
NEUTROPHILS NFR BLD AUTO: 74.3 %
NITRATE UR QL: NEGATIVE
PH UR STRIP: 5.5 PH (ref 5–7)
PLATELET # BLD AUTO: 146 10E9/L (ref 150–450)
POTASSIUM SERPL-SCNC: 4.5 MMOL/L (ref 3.5–5.1)
RBC # BLD AUTO: 3.53 10E12/L (ref 4.4–5.9)
SODIUM SERPL-SCNC: 140 MMOL/L (ref 134–144)
SOURCE: NORMAL
SP GR UR STRIP: 1.01 (ref 1–1.03)
UROBILINOGEN UR STRIP-ACNC: 0.2 EU/DL (ref 0.2–1)
WBC # BLD AUTO: 8.6 10E9/L (ref 4–11)

## 2018-05-10 PROCEDURE — 72128 CT CHEST SPINE W/O DYE: CPT

## 2018-05-10 PROCEDURE — 70450 CT HEAD/BRAIN W/O DYE: CPT

## 2018-05-10 PROCEDURE — A9270 NON-COVERED ITEM OR SERVICE: HCPCS | Mod: GY | Performed by: FAMILY MEDICINE

## 2018-05-10 PROCEDURE — 25000128 H RX IP 250 OP 636: Performed by: FAMILY MEDICINE

## 2018-05-10 PROCEDURE — 81003 URINALYSIS AUTO W/O SCOPE: CPT | Performed by: FAMILY MEDICINE

## 2018-05-10 PROCEDURE — G0378 HOSPITAL OBSERVATION PER HR: HCPCS

## 2018-05-10 PROCEDURE — 99285 EMERGENCY DEPT VISIT HI MDM: CPT | Mod: 25 | Performed by: FAMILY MEDICINE

## 2018-05-10 PROCEDURE — 85610 PROTHROMBIN TIME: CPT | Performed by: FAMILY MEDICINE

## 2018-05-10 PROCEDURE — 73080 X-RAY EXAM OF ELBOW: CPT | Mod: RT

## 2018-05-10 PROCEDURE — 72125 CT NECK SPINE W/O DYE: CPT

## 2018-05-10 PROCEDURE — 36415 COLL VENOUS BLD VENIPUNCTURE: CPT | Performed by: FAMILY MEDICINE

## 2018-05-10 PROCEDURE — 99219 ZZC INITIAL OBSERVATION CARE,LEVL II: CPT | Performed by: FAMILY MEDICINE

## 2018-05-10 PROCEDURE — 72170 X-RAY EXAM OF PELVIS: CPT

## 2018-05-10 PROCEDURE — 85025 COMPLETE CBC W/AUTO DIFF WBC: CPT | Performed by: FAMILY MEDICINE

## 2018-05-10 PROCEDURE — 72131 CT LUMBAR SPINE W/O DYE: CPT

## 2018-05-10 PROCEDURE — 80048 BASIC METABOLIC PNL TOTAL CA: CPT | Performed by: FAMILY MEDICINE

## 2018-05-10 PROCEDURE — 71045 X-RAY EXAM CHEST 1 VIEW: CPT

## 2018-05-10 PROCEDURE — 99285 EMERGENCY DEPT VISIT HI MDM: CPT | Mod: Z6 | Performed by: FAMILY MEDICINE

## 2018-05-10 PROCEDURE — 25000132 ZZH RX MED GY IP 250 OP 250 PS 637: Mod: GY | Performed by: FAMILY MEDICINE

## 2018-05-10 RX ORDER — DIGOXIN 125 MCG
250 TABLET ORAL DAILY
Status: DISCONTINUED | OUTPATIENT
Start: 2018-05-11 | End: 2018-05-11 | Stop reason: HOSPADM

## 2018-05-10 RX ORDER — ONDANSETRON 4 MG/1
4 TABLET, ORALLY DISINTEGRATING ORAL EVERY 6 HOURS PRN
Status: DISCONTINUED | OUTPATIENT
Start: 2018-05-10 | End: 2018-05-11 | Stop reason: HOSPADM

## 2018-05-10 RX ORDER — NALOXONE HYDROCHLORIDE 0.4 MG/ML
.1-.4 INJECTION, SOLUTION INTRAMUSCULAR; INTRAVENOUS; SUBCUTANEOUS
Status: DISCONTINUED | OUTPATIENT
Start: 2018-05-10 | End: 2018-05-11 | Stop reason: HOSPADM

## 2018-05-10 RX ORDER — HYDROCODONE BITARTRATE AND ACETAMINOPHEN 5; 325 MG/1; MG/1
1-2 TABLET ORAL EVERY 4 HOURS PRN
Status: DISCONTINUED | OUTPATIENT
Start: 2018-05-10 | End: 2018-05-11 | Stop reason: HOSPADM

## 2018-05-10 RX ORDER — ACETAMINOPHEN 325 MG/1
650 TABLET ORAL EVERY 4 HOURS PRN
Status: DISCONTINUED | OUTPATIENT
Start: 2018-05-10 | End: 2018-05-11 | Stop reason: HOSPADM

## 2018-05-10 RX ORDER — METOPROLOL SUCCINATE 50 MG/1
50 TABLET, EXTENDED RELEASE ORAL DAILY
Status: DISCONTINUED | OUTPATIENT
Start: 2018-05-11 | End: 2018-05-11 | Stop reason: HOSPADM

## 2018-05-10 RX ORDER — ACETAMINOPHEN 650 MG/1
650 SUPPOSITORY RECTAL EVERY 4 HOURS PRN
Status: DISCONTINUED | OUTPATIENT
Start: 2018-05-10 | End: 2018-05-11 | Stop reason: HOSPADM

## 2018-05-10 RX ORDER — HYDROMORPHONE HYDROCHLORIDE 1 MG/ML
0.2 INJECTION, SOLUTION INTRAMUSCULAR; INTRAVENOUS; SUBCUTANEOUS
Status: DISCONTINUED | OUTPATIENT
Start: 2018-05-10 | End: 2018-05-11 | Stop reason: HOSPADM

## 2018-05-10 RX ORDER — SODIUM CHLORIDE 9 MG/ML
INJECTION, SOLUTION INTRAVENOUS CONTINUOUS
Status: DISCONTINUED | OUTPATIENT
Start: 2018-05-10 | End: 2018-05-10

## 2018-05-10 RX ORDER — LISINOPRIL 20 MG/1
20 TABLET ORAL DAILY
Status: DISCONTINUED | OUTPATIENT
Start: 2018-05-10 | End: 2018-05-11 | Stop reason: HOSPADM

## 2018-05-10 RX ORDER — WARFARIN SODIUM 3 MG/1
6 TABLET ORAL
Status: DISCONTINUED | OUTPATIENT
Start: 2018-05-11 | End: 2018-05-11 | Stop reason: HOSPADM

## 2018-05-10 RX ORDER — TORSEMIDE 10 MG/1
40 TABLET ORAL ONCE
Status: COMPLETED | OUTPATIENT
Start: 2018-05-11 | End: 2018-05-11

## 2018-05-10 RX ORDER — SODIUM CHLORIDE 9 MG/ML
INJECTION, SOLUTION INTRAVENOUS CONTINUOUS
Status: DISCONTINUED | OUTPATIENT
Start: 2018-05-10 | End: 2018-05-11

## 2018-05-10 RX ORDER — ONDANSETRON 2 MG/ML
4 INJECTION INTRAMUSCULAR; INTRAVENOUS EVERY 6 HOURS PRN
Status: DISCONTINUED | OUTPATIENT
Start: 2018-05-10 | End: 2018-05-11 | Stop reason: HOSPADM

## 2018-05-10 RX ADMIN — ACETAMINOPHEN 650 MG: 325 TABLET, FILM COATED ORAL at 21:16

## 2018-05-10 RX ADMIN — SODIUM CHLORIDE: 900 INJECTION, SOLUTION INTRAVENOUS at 18:01

## 2018-05-10 ASSESSMENT — ENCOUNTER SYMPTOMS
EYES NEGATIVE: 1
FEVER: 1
CHEST TIGHTNESS: 1
ACTIVITY CHANGE: 1
WHEEZING: 0
COUGH: 1
NEUROLOGICAL NEGATIVE: 1
GASTROINTESTINAL NEGATIVE: 1
SHORTNESS OF BREATH: 1
BACK PAIN: 1
APPETITE CHANGE: 0
ENDOCRINE NEGATIVE: 1
STRIDOR: 0

## 2018-05-10 NOTE — IP AVS SNAPSHOT
[facility]    [address]    [place] [address]    Phone:  [phone]    Fax:  [phone]                                       After Visit Summary   5/10/2018    Angel Garrett    MRN: [id number]           After Visit Summary Signature Page     I have received my discharge instructions, and my questions have been answered. I have discussed any challenges I see with this plan with the nurse or doctor.    ..........................................................................................................................................  Patient/Patient Representative Signature      ..........................................................................................................................................  Patient Representative Print Name and Relationship to Patient    ..................................................               ................................................  Date                                            Time    ..........................................................................................................................................  Reviewed by Signature/Title    ...................................................              ..............................................  Date                                                            Time

## 2018-05-10 NOTE — ED PROVIDER NOTES
History     Chief Complaint   Patient presents with     Trauma     Fall     HPI  Angel Garrett is a 72 year old male who presents to emergency room after he fell from a foot deck earlier today.  This was happened around 11:30 AM.  He stated that he was leaning up against the railing when the railing gave way because it was rotten and he fell onto the ground onto his back.  Took him a while to get back up and then he went into the house and he tried to rest but he could not get comfortable and was having enough pain that he decided to come to the emergency room.  He notes that he has some difficulty breathing but he states is more because it hurts in his back to take a deep breath.  He denies any loss of consciousness.  He has not had any nausea vomiting diarrhea.  He denies any head pain.  He also injured his right elbow.  He is on Coumadin.    Problem List:    Patient Active Problem List    Diagnosis Date Noted     Long-term (current) use of anticoagulants [Z79.01] 05/08/2018     Priority: Medium     Chronic atrial fibrillation (H) 02/11/2018     Priority: Medium     Anticoagulation monitoring, INR range 2-3 02/11/2018     Priority: Medium     Dysphonia 05/22/2017     Priority: Medium     Peritonsillar abscess 05/15/2017     Priority: Medium     Pulmonary hypertension 05/15/2017     Priority: Medium     Obstructive sleep apnea 11/28/2016     Priority: Medium     Hx of congenital heart disease 12/05/2013     Priority: Medium     Hyperlipemia 12/05/2013     Priority: Medium     Hypertension 12/05/2013     Priority: Medium     Obesity 12/05/2013     Priority: Medium     Prediabetes 12/05/2013     Priority: Medium        Past Medical History:    History reviewed. No pertinent past medical history.    Past Surgical History:    Past Surgical History:   Procedure Laterality Date     COLONOSCOPY      ~2009,Colonography     OTHER SURGICAL HISTORY      2008,,OPEN CHOLECYSTECTOMY     OTHER SURGICAL HISTORY       2002,95964,CARDIAC SURGERY,Pericardial patch, closure of ASD, repair of PFO and repair of anomalous pulmonary venous drainage.     OTHER SURGICAL HISTORY      2016,YEN553,CARDIAC CATHETERIZATION     VASECTOMY      No Comments Provided       Family History:    Family History   Problem Relation Age of Onset     HEART DISEASE Mother      Heart Disease     HEART DISEASE Father      Heart Disease,CHF     Other - See Comments Father      PUD     Other - See Comments Brother      Liver or kidney cancer     Family History Negative Sister      Good Health     DIABETES Brother      Diabetes     Family History Negative Brother      Good Health     Family History Negative Brother      Good Health     Family History Negative Brother      Good Health       Social History:  Marital Status:   [2]  Social History   Substance Use Topics     Smoking status: Former Smoker     Quit date: 9/17/1984     Smokeless tobacco: Never Used     Alcohol use 3.5 oz/week      Comment: Alcoholic Drinks/day: 1-2 drinks a day        Medications:      digoxin (LANOXIN) 250 MCG tablet   lisinopril (PRINIVIL/ZESTRIL) 20 MG tablet   metoprolol succinate (TOPROL-XL) 50 MG 24 hr tablet   Tadalafil, PAH, (ADCIRCA PO)   torsemide (DEMADEX) 20 MG tablet   warfarin (COUMADIN) 3 MG tablet   [DISCONTINUED] DIGOXIN PO   [DISCONTINUED] LISINOPRIL PO   [DISCONTINUED] WARFARIN SODIUM PO         Review of Systems   Constitutional: Positive for activity change and fever. Negative for appetite change.   HENT: Negative.    Eyes: Negative.    Respiratory: Positive for cough, chest tightness and shortness of breath. Negative for wheezing and stridor.    Cardiovascular: Negative for chest pain and leg swelling.   Gastrointestinal: Negative.    Endocrine: Negative.    Genitourinary: Negative.    Musculoskeletal: Positive for back pain.   Skin: Negative.    Neurological: Negative.        Physical Exam   BP: 137/75  Heart Rate: 67  Resp: (!) 31  SpO2: 96 %      Physical  Exam   Constitutional: He is oriented to person, place, and time. He appears well-developed and well-nourished. He appears distressed.   Appears uncomfortable   HENT:   Head: Normocephalic and atraumatic.   Right Ear: External ear normal.   Left Ear: External ear normal.   Nose: Nose normal.   Mouth/Throat: Oropharynx is clear and moist. No oropharyngeal exudate.   Eyes: EOM are normal. Pupils are equal, round, and reactive to light.   Neck: Normal range of motion. Neck supple. No thyromegaly present.   Cardiovascular: Normal rate, regular rhythm and normal heart sounds.    Pulmonary/Chest: Effort normal and breath sounds normal. No respiratory distress. He has no wheezes. He has no rales. He exhibits tenderness.   Right anterior chest wall tenderness   Abdominal: Soft. Bowel sounds are normal. He exhibits no distension. There is no tenderness. There is no rebound.   Musculoskeletal: Normal range of motion.   Right elbow- no deformity noted. Swelling laterally noted. Full range of motion. Sensation intact. Pulses intact.   Lymphadenopathy:     He has no cervical adenopathy.   Neurological: He is alert and oriented to person, place, and time.   Skin: Skin is warm and dry.   Abrasions noted on back. Tender to palpation.    Nursing note and vitals reviewed.      ED Course     ED Course   Patient seen and examined./ CT's. Xray and labs ordered.   Procedures               Critical Care time:  none       Trauma Summary Disposition     Patient is trauma admission:  Standard Emergency Evaluation    Spine  Backboard removal time: Backboard not applied   C-collar and immobilization: not indicated, cleared.  CSpine Clearance: cleared by CT  Full Primary and Secondary survey with appropriate immobilization of spine completed in exam section.     Neuro  GCS at arrival:  Motor 6=Obeys commands   Verbal 5=Oriented   Eye Opening 4=Spontaneous   Total: 15     GCS at disposition: unchanged    ED Procedures completed  none    Patient  admitted.            Results for orders placed or performed during the hospital encounter of 05/10/18 (from the past 24 hour(s))   CBC with platelets differential   Result Value Ref Range    WBC 8.6 4.0 - 11.0 10e9/L    RBC Count 3.53 (L) 4.4 - 5.9 10e12/L    Hemoglobin 12.8 (L) 13.3 - 17.7 g/dL    Hematocrit 37.3 (L) 40.0 - 53.0 %     (H) 78 - 100 fl    MCH 36.3 (H) 26.5 - 33.0 pg    MCHC 34.3 31.5 - 36.5 g/dL    RDW 12.2 10.0 - 15.0 %    Platelet Count 146 (L) 150 - 450 10e9/L    Diff Method Automated Method     % Neutrophils 74.3 %    % Lymphocytes 18.4 %    % Monocytes 5.4 %    % Eosinophils 0.9 %    % Basophils 0.5 %    % Immature Granulocytes 0.5 %    Absolute Neutrophil 6.4 1.6 - 8.3 10e9/L    Absolute Lymphocytes 1.6 0.8 - 5.3 10e9/L    Absolute Monocytes 0.5 0.0 - 1.3 10e9/L    Absolute Eosinophils 0.1 0.0 - 0.7 10e9/L    Absolute Basophils 0.0 0.0 - 0.2 10e9/L    Abs Immature Granulocytes 0.0 0 - 0.4 10e9/L   Basic metabolic panel   Result Value Ref Range    Sodium 140 134 - 144 mmol/L    Potassium 4.5 3.5 - 5.1 mmol/L    Chloride 103 98 - 107 mmol/L    Carbon Dioxide 31 21 - 31 mmol/L    Anion Gap 6 3 - 14 mmol/L    Glucose 127 (H) 70 - 105 mg/dL    Urea Nitrogen 40 (H) 7 - 25 mg/dL    Creatinine 1.52 (H) 0.70 - 1.30 mg/dL    GFR Estimate 45 (L) >60 mL/min/1.7m2    GFR Estimate If Black 55 (L) >60 mL/min/1.7m2    Calcium 9.4 8.6 - 10.3 mg/dL   CT Head w/o Contrast    Narrative    PROCEDURE: CT HEAD W/O CONTRAST     HISTORY: fell backwards off 3 foot deck; .    COMPARISON: None.    TECHNIQUE:  Helical images of the head from the foramen magnum to the  vertex were obtained without contrast.    FINDINGS: The ventricles and sulci are prominent, compatible with  mild, generalized volume loss. No acute intracranial hemorrhage, mass  effect, midline shift, hydrocephalus or basilar cystern effacement are  present.    The grey-white matter interface is preserved.     The calvarium is intact. The mastoid air  cells are clear.  The  visualized paranasal sinuses are clear.      Impression    IMPRESSION: No CT evidence of an acute intracranial process.      CHARI KITCHEN MD   CT Cervical Spine w/o Contrast    Narrative    PROCEDURE: CT CERVICAL SPINE W/O CONTRAST     HISTORY: fell backwards off three foot deck; .    TECHNIQUE: Helical noncontrast CT images of the cervical spine.    COMPARISON: None.    FINDINGS:     No acute fracture is identified. The vertebral bodies are normal in  height. Trace degenerative anterolisthesis of C3 on C4 is present. The  cervical lordosis is straightened. The C1-2 articulation and the  craniocervical junction are intact.     Focal degenerative changes with moderate to severe disc height loss  are present at C4-5, C5-6 and C6-7. No severe spinal stenosis is  present. Scattered foraminal stenoses are present.      Impression    IMPRESSION: No evidence of acute cervical spine fracture.    CHARI KITCHEN MD   *UA reflex to Microscopic   Result Value Ref Range    Color Urine Yellow     Appearance Urine Clear     Glucose Urine Negative NEG^Negative mg/dL    Bilirubin Urine Negative NEG^Negative    Ketones Urine Negative NEG^Negative mg/dL    Specific Gravity Urine 1.015 1.003 - 1.035    Blood Urine Negative NEG^Negative    pH Urine 5.5 5.0 - 7.0 pH    Protein Albumin Urine Negative NEG^Negative mg/dL    Urobilinogen Urine 0.2 0.2 - 1.0 EU/dL    Nitrite Urine Negative NEG^Negative    Leukocyte Esterase Urine Negative NEG^Negative    Source Unspecified Urine    XR Elbow Right G/E 3 Views    Narrative    PROCEDURE:  XR ELBOW RT G/E 3 VW    HISTORY: fell backwards off deck 3 feet;     COMPARISON:  None.    TECHNIQUE:  3 views of the right elbow were obtained.    FINDINGS:  No fracture or dislocation is identified. The joint spaces  are preserved.        Impression    IMPRESSION: No acute fracture.      RODRIGUEZ BAILON MD   CT Thoracic Spine w/o Contrast    Narrative    PROCEDURE: CT  THORACIC SPINE W/O CONTRAST 5/10/2018 2:54 PM    HISTORY: fell three feet backwards off deck onto back;     COMPARISONS: None.    TECHNIQUE: CT images of the thoracic spine were obtained with sagittal  and coronal reformatted images reviewed.    FINDINGS: There is slight dextroscoliosis. Alignment is otherwise  maintained. Vertebral body heights are maintained. There is mild disc  space narrowing and degenerative endplate changes at multiple levels.  No acute fracture or subluxation.    There are fractures of the right anterior fourth and fifth ribs, only  partially assessed.    There is mild atelectasis in the lung bases. Prevertebral soft tissues  are otherwise unremarkable.      Impression    IMPRESSION:  1. No acute fracture or subluxation in the thoracic spine.  2. Right anterior fourth and fifth rib fractures, only partially  visualized. These could be acute or chronic. Suggest correlation with  tenderness.    RODRIGUEZ BAILON MD   XR Pelvis 1/2 Views    Narrative    PROCEDURE: XR PELVIS 1/2 VW 5/10/2018 2:54 PM    HISTORY: fell backwards off deck 3 feet high - on coumadin;     COMPARISONS: None.    TECHNIQUE: Pelvis one view    FINDINGS: There are moderate degenerative changes of both hips. No  acute fracture or dislocation.         Impression    IMPRESSION: No acute fracture.    RODRIGUEZ BAILON MD   XR Chest 1 View    Narrative    PROCEDURE:  XR CHEST 1 VW    HISTORY:  fell from 3 foot deck backwards; .     COMPARISON:  None.    FINDINGS:   The cardiac silhouette is mildly enlarged. The pulmonary vasculature  is mildly congested. There are airspace opacities in the lung bases  bilaterally. No pleural effusion or pneumothorax. There are  postoperative changes of previous median sternotomy.      Impression    IMPRESSION:  Mild congestive heart failure.      RODRIGUEZ BAILON MD   CT Lumbar Spine w/o Contrast    Narrative    PROCEDURE: CT LUMBAR SPINE W/O CONTRAST 5/10/2018 3:02 PM    HISTORY: fell backwards 3 feet  off deck;     COMPARISONS: None.    TECHNIQUE: CT images of the lumbar spine were obtained with sagittal  and coronal reformatted images reviewed.    FINDINGS:     There is moderate dextroscoliosis. There is also mild anterolisthesis  of L5 relative to S1, likely degenerative. Vertebral body heights are  maintained. Disc spaces are narrowed at all levels with associated  degenerative endplate changes and osteophytes. There is also  multilevel facet arthrosis and multilevel neural foraminal narrowing.  No acute fracture or subluxation.          Impression    IMPRESSION: No acute fracture or subluxation in the lumbar spine.    RODRIGUEZ BAILON MD   INR   Result Value Ref Range    INR 2.37 (H) 0 - 1.3       Medications   sodium chloride 0.9% infusion (not administered)   HYDROmorphone (PF) (DILAUDID) injection 0.2 mg (not administered)       Assessments & Plan (with Medical Decision Making)     I have reviewed the nursing notes.    I have reviewed the findings, diagnosis, plan and need for follow up with the patient.       ED to Inpatient Handoff:    Discussed with Dr Jaramillo   Patient accepted for Observation Stay  Pending studies include none  Code Status: Not Addressed       Patient admitted secondary to being on long-term chronic anticoagulants, elderly and with 2 rib fractures.  He is in quite a bit of pain initially but seemed to be much more comfortable after he been in the ER for little while he did decline any pain medications.  Other injuries are noted at present time.    New Prescriptions    No medications on file       Final diagnoses:   Closed fracture of multiple ribs of right side, initial encounter   Long term current use of anticoagulant therapy   Fall, initial encounter       5/10/2018   [facility] HospitalG[facility]nesis MD  05/10/18 [id number]

## 2018-05-10 NOTE — ED NOTES
COLUMBIA-SUICIDE SEVERITY RATING SCALE   Screen with Triage Points for Emergency Department      Ask questions that are bolded and underlined.   Past  month   Ask Questions 1 and 2 YES NO   1)  Have you wished you were dead or wished you could go to sleep and not wake up?   x   2)  Have you actually had any thoughts of killing yourself?   x   If YES to 2, ask questions 3, 4, 5, and 6.  If NO to 2, go directly to question 6.   3)  Have you been thinking about how you might do this?   E.g.  I thought about taking an overdose but I never made a specific plan as to when where or how I would actually do it .and I would never go through with it.    x   4)  Have you had these thoughts and had some intention of acting on them?   As opposed to  I have the thoughts but I definitely will not do anything about them.    x   5)  Have you started to work out or worked out the details of how to kill yourself? Do you intend to carry out this plan?   x   6)  Have you ever done anything, started to do anything, or prepared to do anything to end your life?  Examples: Collected pills, obtained a gun, gave away valuables, wrote a will or suicide note, took out pills but didn t swallow any, held a gun but changed your mind or it was grabbed from your hand, went to the roof but didn t jump; or actually took pills, tried to shoot yourself, cut yourself, tried to hang yourself, etc.    If YES, ask: Was this within the past three months?  Lifetime     x    Past 3 Months     x   Item 1:  Behavioral Health Referral at Discharge  Item 2:  Behavioral Health Referral at Discharge   Item 3:  Behavioral Health Consult (Psychiatric Nurse/) and consider Patient Safety Precautions  Item 4:  Immediate Notification of Physician and/or Behavioral Health and Patient Safety Precautions   Item 5:  Immediate Notification of Physician and/or Behavioral Health and Patient Safety Precautions  Item 6:  Over 3 months ago: Behavioral Health Consult  (Psychiatric Nurse/) and consider Patient Safety Precautions  OR  Item 6:  3 months ago or less: Immediate Notification of Physician and/or Behavioral Health and Patient Safety Precautions

## 2018-05-10 NOTE — IP AVS SNAPSHOT
MRN:[id number]                      After Visit Summary   5/10/2018    Angel Garrett    MRN: [id number]           Thank you!     Thank you for choosing [facility] for your care. Our goal is always to provide you with excellent care. Hearing back from our patients is one way we can continue to improve our services. Please take a few minutes to complete the written survey that you may receive in the mail after you visit with us. Thank you!        Patient Information     Date Of Birth          1945        Designated Caregiver       Most Recent Value    Caregiver    Will someone help with your care after discharge? yes    Name of designated caregiver Raiza/Wife    Phone number of caregiver [phone]    Caregiver address [place]      About your hospital stay     You were admitted on:  May 10, 2018 You last received care in the:  [facility]    You were discharged on:  May 11, 2018        Reason for your hospital stay       You fell off the deck and broke two ribs.  We wanted to monitor you due to your coumadin and your hemoglobin stayed stable when accounting for a little dilution with IVF.  As we mentioned you need to come back emergently with any bloody cough or acute shortness of breath.                  Who to Call     For medical emergencies, please call 911.  For non-urgent questions about your medical care, please call your primary care provider or clinic, [phone]          Attending Provider     Provider Specialty    Génesis Ba MD Family Practice    Bharat Jaramillo MD Family Practice       Primary Care Provider Office Phone # Fax #    Donte CERNA MD [place] [phone]      After Care Instructions     Activity       Your activity upon discharge: activity as tolerated            Diet       Follow this diet upon discharge: Orders Placed This Encounter      Regular Diet Adult                  Follow-up Appointments     Follow-up and recommended labs  "and tests        Follow up with primary care provider, Donte Mckeon, within 7 days to evaluate after hospitalization.                  Your next 10 appointments already scheduled     2018 10:15 AM CDT   Anticoagulation Visit with GH ANTI COAG 1   [facility]    [address]  [place] [id number] [phone]              Pending Results     No orders found for last 3 day(s).            Statement of Approval     Ordered          18 1046  I have reviewed and agree with all the recommendations and orders detailed in this document.  EFFECTIVE NOW     Approved and electronically signed by:  Bharat Jaramillo MD           18 1043  I have reviewed and agree with all the recommendations and orders detailed in this document.  EFFECTIVE NOW     Approved and electronically signed by:  Bharat Jaramillo MD             Admission Information     Date & Time Provider Department Dept. Phone    5/10/2018 Bharat Jaramillo MD [facility] [phone]      Your Vitals Were     Blood Pressure Pulse Temperature Respirations Weight Pulse Oximetry    120/62 (BP Location: Right arm) 65 98.2  F (36.8  C) (Tympanic) 15 95.4 kg (210 lb 6.4 oz) 99%    BMI (Body Mass Index)                   33.45 kg/m2           [facility] Information     [facility] lets you send messages to your doctor, view your test results, renew your prescriptions, schedule appointments and more. To sign up, go to [email or web address] . Click on \"Log in\" on the left side of the screen, which will take you to the Welcome page. Then click on \"Sign up Now\" on the right side of the page.     You will be asked to enter the access code listed below, as well as some personal information. Please follow the directions to create your username and password.     Your access code is: [email or web address]  Expires: 2018 11:01 AM     Your access code will  in 90 days. If " you need help or a new code, please call your [facility] clinic or [phone].        Care EveryWhere ID     This is your Care EveryWhere ID. This could be used by other organizations to access your [facility] medical records  [id number]        Equal Access to Services     GRISELANDREEA ELVIA : Hadii aad ku hadnadegeaugustina Sostanali, waaxda luqadaha, qaybta kaalmada adenghiada, sammi alatorre kristinayahaira rachel nidaolivia mitchell. So [facility] [phone].    ATENCIÓN: Si habla español, tiene a dennis disposición servicios gratuitos de asistencia lingüística. Llame al [phone].    We comply with applicable federal civil rights laws and [place] laws. We do not discriminate on the basis of race, color, national origin, age, disability, sex, sexual orientation, or gender identity.               Review of your medicines      START taking        Dose / Directions    acetaminophen 325 MG tablet   Commonly known as:  TYLENOL        Dose:  650 mg   Take 2 tablets (650 mg) by mouth every 4 hours as needed for mild pain   Quantity:  100 tablet   Refills:  1         CONTINUE these medicines which have NOT CHANGED        Dose / Directions    ADCIRCA PO        Dose:  20 mg   Take 20 mg by mouth 2 times daily   Refills:  0       digoxin 250 MCG tablet   Commonly known as:  LANOXIN        Dose:  250 mcg   Take 250 mcg by mouth daily   Refills:  0       lisinopril 20 MG tablet   Commonly known as:  PRINIVIL/ZESTRIL        Dose:  20 mg   Take 20 mg by mouth daily   Refills:  0       metoprolol succinate 50 MG 24 hr tablet   Commonly known as:  TOPROL-XL        Take 50 mg by mouth once daily.   Refills:  0       torsemide 20 MG tablet   Commonly known as:  DEMADEX        Take one tablet daily one day and 2 on the opposite day to alternate every other day.   Refills:  0       warfarin 3 MG tablet   Commonly known as:  COUMADIN   Used for:  Chronic atrial fibrillation (H)        TAKE 9MG (3 TABS) X1 DAY/WEEK, AND 6MG (2 TABS) X6DAYS/WEEK.   Quantity:  180 tablet    Refills:  0            Where to get your medicines      These medications were sent to [facility] - [place], [place] - [address]  [address] [place] [address]     Phone:  [phone]     acetaminophen 325 MG tablet                Protect others around you: Learn how to safely use, store and throw away your medicines at www.disposemymeds.org.             Medication List: This is a list of all your medications and when to take them. Check marks below indicate your daily home schedule. Keep this list as a reference.      Medications           Morning Afternoon Evening Bedtime As Needed    acetaminophen 325 MG tablet   Commonly known as:  TYLENOL   Take 2 tablets (650 mg) by mouth every 4 hours as needed for mild pain   Last time this was given:  650 mg on 5/11/2018  8:01 AM                            May take anytime after 1200 p as needed       ADCIRCA PO   Take 20 mg by mouth 2 times daily            Take once home and another dose tonight                          digoxin 250 MCG tablet   Commonly known as:  LANOXIN   Take 250 mcg by mouth daily            Take once home                       lisinopril 20 MG tablet   Commonly known as:  PRINIVIL/ZESTRIL   Take 20 mg by mouth daily            Take once home                       metoprolol succinate 50 MG 24 hr tablet   Commonly known as:  TOPROL-XL   Take 50 mg by mouth once daily.            Take once home                       torsemide 20 MG tablet   Commonly known as:  DEMADEX   Take one tablet daily one day and 2 on the opposite day to alternate every other day.   Last time this was given:  40 mg on 5/11/2018  8:01 AM            Take tomorrow, had one dose this am                       warfarin 3 MG tablet   Commonly known as:  COUMADIN   TAKE 9MG (3 TABS) X1 DAY/WEEK, AND 6MG (2 TABS) X6DAYS/WEEK.                    Take tonight

## 2018-05-10 NOTE — PROGRESS NOTES
NS ADMISSION NOTE    Patient admitted to room 314 at approximately 1730 via wheel chair from emergency room. Patient was accompanied by spouse and transport tech.     Verbal SBAR report received from PAIGE Arana prior to patient arrival.     Patient ambulated to bed with stand-by assist. Patient alert and oriented X 3. Pain is controlled without any medications. 0-10 Pain Scale: 2. Admission vital signs: Blood pressure 156/72, temperature 97.9  F (36.6  C), temperature source Tympanic, resp. rate 18, SpO2 96 %. Patient was oriented to plan of care, call light, bed controls, tv, telephone and bathroom.     Risk Assessment    The following safety risks were identified during admission: fall. Yellow risk band applied: NO.     Skin Initial Assessment    This writer admitted this patient and completed a full skin assessment and Jaydon score in the Adult PCS flowsheet. Appropriate interventions initiated as needed.         Skin  Inspection of bony prominences: Full  Skin WDL:  WDL except  Skin Color/Characteristics: flushed  Skin Temperature: cool  Skin Moisture: dry  Skin Elasticity: quick return to original state  Skin Integrity: bruise(s) (on back)    Jaydon Risk Assessment  Sensory Perception: 4-->no impairment  Moisture: 4-->rarely moist  Activity: 3-->walks occasionally  Mobility: 3-->slightly limited  Nutrition: 4-->excellent  Friction and Shear: 3-->no apparent problem  Jaydon Score: 21    Alma Rojas

## 2018-05-11 VITALS
BODY MASS INDEX: 33.45 KG/M2 | SYSTOLIC BLOOD PRESSURE: 126 MMHG | OXYGEN SATURATION: 95 % | DIASTOLIC BLOOD PRESSURE: 62 MMHG | RESPIRATION RATE: 18 BRPM | TEMPERATURE: 97.2 F | HEART RATE: 65 BPM | WEIGHT: 210.4 LBS

## 2018-05-11 LAB
ERYTHROCYTE [DISTWIDTH] IN BLOOD BY AUTOMATED COUNT: 12.2 % (ref 10–15)
HCT VFR BLD AUTO: 31.5 % (ref 40–53)
HGB BLD-MCNC: 10.7 G/DL (ref 13.3–17.7)
HGB BLD-MCNC: 12.1 G/DL (ref 13.3–17.7)
MCH RBC QN AUTO: 35.5 PG (ref 26.5–33)
MCHC RBC AUTO-ENTMCNC: 34 G/DL (ref 31.5–36.5)
MCV RBC AUTO: 105 FL (ref 78–100)
PLATELET # BLD AUTO: 125 10E9/L (ref 150–450)
RBC # BLD AUTO: 3.01 10E12/L (ref 4.4–5.9)
WBC # BLD AUTO: 5.7 10E9/L (ref 4–11)

## 2018-05-11 PROCEDURE — 85027 COMPLETE CBC AUTOMATED: CPT | Performed by: FAMILY MEDICINE

## 2018-05-11 PROCEDURE — 85018 HEMOGLOBIN: CPT | Performed by: FAMILY MEDICINE

## 2018-05-11 PROCEDURE — G0378 HOSPITAL OBSERVATION PER HR: HCPCS

## 2018-05-11 PROCEDURE — 25000132 ZZH RX MED GY IP 250 OP 250 PS 637: Mod: GY | Performed by: FAMILY MEDICINE

## 2018-05-11 PROCEDURE — 99217 ZZC OBSERVATION CARE DISCHARGE: CPT | Performed by: FAMILY MEDICINE

## 2018-05-11 PROCEDURE — 36415 COLL VENOUS BLD VENIPUNCTURE: CPT | Performed by: FAMILY MEDICINE

## 2018-05-11 PROCEDURE — A9270 NON-COVERED ITEM OR SERVICE: HCPCS | Mod: GY | Performed by: FAMILY MEDICINE

## 2018-05-11 RX ORDER — ACETAMINOPHEN 325 MG/1
650 TABLET ORAL EVERY 4 HOURS PRN
Qty: 100 TABLET | Refills: 1 | Status: SHIPPED | OUTPATIENT
Start: 2018-05-11 | End: 2022-03-10

## 2018-05-11 RX ADMIN — ACETAMINOPHEN 650 MG: 325 TABLET, FILM COATED ORAL at 08:01

## 2018-05-11 RX ADMIN — ACETAMINOPHEN 650 MG: 325 TABLET, FILM COATED ORAL at 01:08

## 2018-05-11 RX ADMIN — TORSEMIDE 40 MG: 10 TABLET ORAL at 08:01

## 2018-05-11 NOTE — PHARMACY - DISCHARGE MEDICATION RECONCILIATION AND EDUCATION
Pharmacy:  Discharge Counseling and Medication Reconciliation    Angel Grarett  [address]  [place] [id number] [phone] (home)   72 year old male  PCP: Donte Mckeon    Allergies: Review of patient's allergies indicates no known allergies.    Discharge Counseling:    Pharmacist met with patient (and/or family) today to review the medication portion of the After Visit Summary (with an emphasis on NEW medications) and to address patient's questions/concerns.    Summary of Education: Provided education with patient on Tylenol. Also discussed getting protime appointment checked early next week, and to continue current warfarin dose    Materials Provided:  MedCounselor sheets printed from Clinical Pharmacology on: Tylenol    Discharge Medication Reconciliation:    Ricky Mackay RPH has reviewed the patient's discharge medication orders and has compared them to the inpatient medication administration record and to what the patient was taking prior to admission - any discrepancies have been resolved.    It has been determined that the patient has an adequate supply of medications available or which can be obtained from the patient's preferred pharmacy.    Thank you for the consult.    Ricky Mackay RPH........May 11, 2018 11:18 AM

## 2018-05-11 NOTE — H&P
[facility]    History and Physical  Hospitalist       Date of Admission:  5/10/2018    Assessment & Plan   Angel Garrett is a 72 year old male who presents with 2.5 foot fall off deck onto back.  He had extensive imaging and was found to have two anterior rib fractures.  His exam and symptoms fit with this and currently no evidence of acute cardiopulmonary disease.  He will be observed overnight for pain control and monitoring given his anticoagulation.       Active Problems:    Chronic atrial fibrillation (H)    Assessment: currently appears to be in a regular rate    Plan: continue outpatient medications    Hypertension    Assessment: stable    Plan: monitor    Obstructive sleep apnea    Assessment: CPAP at home    Plan: Patient declines CPAP in hospital    Pulmonary hypertension    Assessment: stable    Plan: continue home meds    Fall at home    Assessment: No evidence of fracture at elbow or of spine.  Does have two rib fractures    Plan: Pain control, monitor overnight    Long term current use of anticoagulant therapy    Assessment: INR therapeutic    Plan: continue coumadin    Closed fracture of multiple ribs of right side, initial encounter    Assessment: Present, and exam suggests acute    Plan: pain control and monitoring    # Pain Assessment:  Current Pain Score 5/10/2018   Patient currently in pain? yes   Pain score (0-10) 2   Pain location Rib cage   Pain descriptors Achdaniele   - Angel is experiencing pain due to rib fracture and fall. Pain management was discussed and the plan was created in a collaborative fashion.  Angel's response to the current recommendations: resistant  - Opioid regimen: hydrocodone 5/325   Can use tylenol but suggest he trial hydrocodone while in hospital to see if he has any side effects for possible use at home.        DVT Prophylaxis: Warfarin  Code Status: No Order    Bharat Jaramillo    Primary Care Physician   Donte Mckeon    Chief Complaint   Rib  and back pain    History is obtained from the patient and chart review.    History of Present Illness   Angel Garrett is a 72 year old male who presents to the ED after a fall at home.  Was moving a generator across deck with the hand rail broke and he fell about 2.5ft to the ground.  He did not land on the broken railing.  Landed flat on his back on the ground.  Right elbow may have hit a rock.  He had the wind knocked out of him.  He does not think he had any head trauma and no LOC.  Went inside and over the next 3 hours just couldn't get comfortable due to pain in his back and anterior chest wall.  Didn't really feel short of breath but just hurt to take deep inspiration, moreso though to twist.  No anginal equivalents.    Currently he is sitting comfortably with no pain.  Just hurts when he moves.  Othewise feels well.  He is hesitant to take any pain medications.    Patient is on coumadin for atrial fibrillation.  Had INR 5/8 that was 2.6.  No history of black or bloody stools.  No abdominal pain.      No palpitations, taking medicine as prescribed.  Currently not short of breath.  Does use CPAP for [place] but reports he feels better now sitting up reclined and does not want wife to bring in CPAP tonight as he reports he doesn't need it if not flat on back.    Past Medical History    I have reviewed this patient's medical history and updated it with pertinent information if needed.   History reviewed. No pertinent past medical history.    Past Surgical History   I have reviewed this patient's surgical history and updated it with pertinent information if needed.  Past Surgical History:   Procedure Laterality Date     COLONOSCOPY      ~2009,Colonography     OTHER SURGICAL HISTORY      2008,,OPEN CHOLECYSTECTOMY     OTHER SURGICAL HISTORY      2002,33999,CARDIAC SURGERY,Pericardial patch, closure of ASD, repair of PFO and repair of anomalous pulmonary venous drainage.     OTHER SURGICAL HISTORY       2016,UHR559,CARDIAC CATHETERIZATION     VASECTOMY      No Comments Provided       Prior to Admission Medications   Prior to Admission Medications   Prescriptions Last Dose Informant Patient Reported? Taking?   Tadalafil, PAH, (ADCIRCA PO) 5/10/2018 at Unknown time  Yes Yes   Sig: Take 20 mg by mouth 2 times daily   digoxin (LANOXIN) 250 MCG tablet 5/10/2018 at Unknown time  Yes Yes   Sig: Take 250 mcg by mouth daily   lisinopril (PRINIVIL/ZESTRIL) 20 MG tablet 5/10/2018 at Unknown time  Yes Yes   Sig: Take 20 mg by mouth daily   metoprolol succinate (TOPROL-XL) 50 MG 24 hr tablet 5/10/2018 at Unknown time  Yes Yes   Sig: Take 50 mg by mouth once daily.   torsemide (DEMADEX) 20 MG tablet 5/10/2018 at Unknown time  Yes Yes   Sig: Take one tablet daily one day and 2 on the opposite day to alternate every other day.   warfarin (COUMADIN) 3 MG tablet 5/10/2018 at Unknown time  No Yes   Sig: TAKE 9MG (3 TABS) X1 DAY/WEEK, AND 6MG (2 TABS) X6DAYS/WEEK.      Facility-Administered Medications: None     Allergies   No Known Allergies    Social History   I have reviewed this patient's social history and updated it with pertinent information if needed. Angel YARBROUGH Gerry  reports that he quit smoking about 33 years ago. He has never used smokeless tobacco. He reports that he drinks about 3.5 oz of alcohol per week     Family History   I have reviewed this patient's family history and updated it with pertinent information if needed.   Family History   Problem Relation Age of Onset     HEART DISEASE Mother      Heart Disease     HEART DISEASE Father      Heart Disease,CHF     Other - See Comments Father      PUD     Other - See Comments Brother      Liver or kidney cancer     Family History Negative Sister      Good Health     DIABETES Brother      Diabetes     Family History Negative Brother      Good Health     Family History Negative Brother      Good Health     Family History Negative Brother      Good Health       Review of  Systems   The 10 point Review of Systems is negative other than noted in the HPI or here.     Physical Exam   Temp: 98.3  F (36.8  C) Temp src: Tympanic BP: 120/57 Pulse: 74 Heart Rate: 65 Resp: 15 SpO2: 94 % O2 Device: None (Room air)    Vital Signs with Ranges  Temp:  [97.9  F (36.6  C)-98.3  F (36.8  C)] 98.3  F (36.8  C)  Pulse:  [74] 74  Heart Rate:  [54-68] 65  Resp:  [14-48] 15  BP: (120-156)/(57-75) 120/57  SpO2:  [93 %-96 %] 94 %  0 lbs 0 oz    Constitutional: NAD  Eyes: PERRL, EOMI, Fundoscopic exam normal  HEENT: NCAT, TMs nl.  EAC nl.  OP/NP nl.  MMM  Respiratory: Clear toascultation bilaterally.  No increased respiratory effort.   Cardiovascular: Regular rate and regular rhythm.  No murmurs rubs or gallops heard.   GI: Abdomen Soft, non-tender.  No masses, rebound or guarding.   Lymph/Hematologic: No lymphadenopathy  Skin: abrasions on back  Musculoskeletal: Tender with palpation of the right anterior chest wall at site of fracture.  Also some muscular tenderness of his thoracic spine but no midline tenderness at present  Neurologic: Reflexes nl.    Psychiatric: Alert and Oriented x3.  Patient is an adequate medical decision maker.    Data   Data reviewed today:  I personally reviewed his chest xr, lumbar cervical and thoracic and head CT.  Pelvic XR and elbow film.    Recent Labs  Lab 05/10/18  1526 05/10/18  1420 05/08/18   WBC  --  8.6  --    HGB  --  12.8*  --    MCV  --  106*  --    PLT  --  146*  --    INR 2.37*  --  2.6*   NA  --  140  --    POTASSIUM  --  4.5  --    CHLORIDE  --  103  --    CO2  --  31  --    BUN  --  40*  --    CR  --  1.52*  --    ANIONGAP  --  6  --    ZACHARIAH  --  9.4  --    GLC  --  127*  --        Recent Results (from the past 24 hour(s))   CT Head w/o Contrast    Narrative    PROCEDURE: CT HEAD W/O CONTRAST     HISTORY: fell backwards off 3 foot deck; .    COMPARISON: None.    TECHNIQUE:  Helical images of the head from the foramen magnum to the  vertex were obtained without  contrast.    FINDINGS: The ventricles and sulci are prominent, compatible with  mild, generalized volume loss. No acute intracranial hemorrhage, mass  effect, midline shift, hydrocephalus or basilar cystern effacement are  present.    The grey-white matter interface is preserved.     The calvarium is intact. The mastoid air cells are clear.  The  visualized paranasal sinuses are clear.      Impression    IMPRESSION: No CT evidence of an acute intracranial process.      CHARI KITCHEN MD   CT Cervical Spine w/o Contrast    Narrative    PROCEDURE: CT CERVICAL SPINE W/O CONTRAST     HISTORY: fell backwards off three foot deck; .    TECHNIQUE: Helical noncontrast CT images of the cervical spine.    COMPARISON: None.    FINDINGS:     No acute fracture is identified. The vertebral bodies are normal in  height. Trace degenerative anterolisthesis of C3 on C4 is present. The  cervical lordosis is straightened. The C1-2 articulation and the  craniocervical junction are intact.     Focal degenerative changes with moderate to severe disc height loss  are present at C4-5, C5-6 and C6-7. No severe spinal stenosis is  present. Scattered foraminal stenoses are present.      Impression    IMPRESSION: No evidence of acute cervical spine fracture.    CHARI KITCHEN MD   XR Elbow Right G/E 3 Views    Narrative    PROCEDURE:  XR ELBOW RT G/E 3 VW    HISTORY: fell backwards off deck 3 feet;     COMPARISON:  None.    TECHNIQUE:  3 views of the right elbow were obtained.    FINDINGS:  No fracture or dislocation is identified. The joint spaces  are preserved.        Impression    IMPRESSION: No acute fracture.      RODRIGUEZ BAILON MD   CT Thoracic Spine w/o Contrast    Narrative    PROCEDURE: CT THORACIC SPINE W/O CONTRAST 5/10/2018 2:54 PM    HISTORY: fell three feet backwards off deck onto back;     COMPARISONS: None.    TECHNIQUE: CT images of the thoracic spine were obtained with sagittal  and coronal reformatted images  reviewed.    FINDINGS: There is slight dextroscoliosis. Alignment is otherwise  maintained. Vertebral body heights are maintained. There is mild disc  space narrowing and degenerative endplate changes at multiple levels.  No acute fracture or subluxation.    There are fractures of the right anterior fourth and fifth ribs, only  partially assessed.    There is mild atelectasis in the lung bases. Prevertebral soft tissues  are otherwise unremarkable.      Impression    IMPRESSION:  1. No acute fracture or subluxation in the thoracic spine.  2. Right anterior fourth and fifth rib fractures, only partially  visualized. These could be acute or chronic. Suggest correlation with  tenderness.    RODRIGUEZ BAILON MD   XR Pelvis 1/2 Views    Narrative    PROCEDURE: XR PELVIS 1/2 VW 5/10/2018 2:54 PM    HISTORY: fell backwards off deck 3 feet high - on coumadin;     COMPARISONS: None.    TECHNIQUE: Pelvis one view    FINDINGS: There are moderate degenerative changes of both hips. No  acute fracture or dislocation.         Impression    IMPRESSION: No acute fracture.    RODRIGUEZ BAILON MD   XR Chest 1 View    Narrative    PROCEDURE:  XR CHEST 1 VW    HISTORY:  fell from 3 foot deck backwards; .     COMPARISON:  None.    FINDINGS:   The cardiac silhouette is mildly enlarged. The pulmonary vasculature  is mildly congested. There are airspace opacities in the lung bases  bilaterally. No pleural effusion or pneumothorax. There are  postoperative changes of previous median sternotomy.      Impression    IMPRESSION:  Mild congestive heart failure.      RODRIGUEZ BAILON MD   CT Lumbar Spine w/o Contrast    Narrative    PROCEDURE: CT LUMBAR SPINE W/O CONTRAST 5/10/2018 3:02 PM    HISTORY: fell backwards 3 feet off deck;     COMPARISONS: None.    TECHNIQUE: CT images of the lumbar spine were obtained with sagittal  and coronal reformatted images reviewed.    FINDINGS:     There is moderate dextroscoliosis. There is also mild  anterolisthesis  of L5 relative to S1, likely degenerative. Vertebral body heights are  maintained. Disc spaces are narrowed at all levels with associated  degenerative endplate changes and osteophytes. There is also  multilevel facet arthrosis and multilevel neural foraminal narrowing.  No acute fracture or subluxation.          Impression    IMPRESSION: No acute fracture or subluxation in the lumbar spine.    RODRIGUEZ BAILON MD

## 2018-05-11 NOTE — PROGRESS NOTES
VS continue to be stable, patient resting in bed. Tylenol requested for pain and given PRN. No other concerns at this time, will continue to monitor.     Cyndi Loaiza RN on 5/11/2018 at 4:52 AM

## 2018-05-11 NOTE — PROGRESS NOTES
VS continue to be stable, see flowsheet. Patient resting at this time. Will continue to monitor.     Cyndi Loaiza RN on 5/11/2018 at 12:35 AM\

## 2018-05-11 NOTE — PROGRESS NOTES
Patient received tylenol for pain, see MAR. VS stable at this time. Patient feels more comfortable sleeping in upright position in the bed. Will continue to monitor.    Cyndi Loaiza RN on 5/10/2018 at 11:20 PM

## 2018-05-11 NOTE — PLAN OF CARE
Problem: Patient Care Overview  Goal: Plan of Care/Patient Progress Review  Outcome: Adequate for Discharge Date Met: 05/11/18   [facility] DISCHARGE NOTE    Patient discharged to home at 11:33 am via wheel chair. Accompanied by spouse and staff. Discharge instructions reviewed with patient, opportunity offered to ask questions. Prescriptions sent to patients preferred pharmacy. All belongings sent with patient. Saline lock removed prior to discharge and informed patient to let staff know if any further questions or concerns, none at time of discharge instructions and at time of discharge.     Ruth Marie

## 2018-05-11 NOTE — DISCHARGE SUMMARY
"[facility]    Discharge Summary  Hospitalist    Date of Admission:  5/10/2018  Date of Discharge:  5/11/2018  Discharging Provider: Bharat Jaramillo  Date of Service (when I saw the patient): 05/11/18    Discharge Diagnoses   Active Problems:    Chronic atrial fibrillation (H) (2/11/2018)    Hypertension (12/5/2013)    Obstructive sleep apnea (11/28/2016)    Pulmonary hypertension (5/15/2017)    Fall at home (5/10/2018)    Long term current use of anticoagulant therapy (5/10/2018)    Closed fracture of multiple ribs of right side, initial encounter (5/10/2018)      History of Present Illness   Angel Garrett is an 72 year old male who presented with \"Angel Garrett is a 72 year old male who presents to the ED after a fall at home.  Was moving a generator across deck with the hand rail broke and he fell about 2.5ft to the ground.  He did not land on the broken railing.  Landed flat on his back on the ground.  Right elbow may have hit a rock.  He had the wind knocked out of him.  He does not think he had any head trauma and no LOC.  Went inside and over the next 3 hours just couldn't get comfortable due to pain in his back and anterior chest wall.  Didn't really feel short of breath but just hurt to take deep inspiration, moreso though to twist.  No anginal equivalents.     Currently he is sitting comfortably with no pain.  Just hurts when he moves.  Othewise feels well.  He is hesitant to take any pain medications.     Patient is on coumadin for atrial fibrillation.  Had INR 5/8 that was 2.6.  No history of black or bloody stools.  No abdominal pain.       No palpitations, taking medicine as prescribed.  Currently not short of breath.  Does use CPAP for JANESSA but reports he feels better now sitting up reclined and does not want wife to bring in CPAP tonight as he reports he doesn't need it if not flat on back.\"    Hospital Course   Angel Garrett was admitted on 5/10/2018.  He was observed " overnight and had no episodes of hemoptysis or acute shortness of breath.  His Hgb was a bit lower in the morning likely due to dilution but then was back up to 12.1 on discharge.  His pain on admission corresponded to rib fracutures as well as muscular back pain.  On discharge the anterior rib pain had actually improved significantly but he still had quite a bit of muscular back pain mainly on the right side.  He did not use narcotics while here, used tylenol and plans to continue with ice, rest and tylenol as needed at home.  He will return emergently with any s/s of pneumothorax or hemopneumothorax or any other acute issues.    # Discharge Pain Plan:   - During his hospitalization, Angel experienced pain due to recent fall.  The pain plan for discharge was discussed with Angel and the plan was created in a collaborative fashion.    - Tylenol, pt declines narcotics.    Bharat Jaramillo MD    Significant Results and Procedures   NA    Pending Results   These results will be followed up by NA  Unresulted Labs Ordered in the Past 30 Days of this Admission     No orders found for last 61 day(s).          Code Status   Full Code       Primary Care Physician   Donte Mckeon    Physical Exam   Temp: 98.2  F (36.8  C) Temp src: Tympanic BP: 120/62 Pulse: 65 Heart Rate: 60 Resp: 15 SpO2: 99 % O2 Device: None (Room air)    Vitals:    05/11/18 0534   Weight: 95.4 kg (210 lb 6.4 oz)     Vital Signs with Ranges  Temp:  [97.9  F (36.6  C)-98.6  F (37  C)] 98.2  F (36.8  C)  Pulse:  [65-74] 65  Heart Rate:  [54-68] 60  Resp:  [14-48] 15  BP: (120-156)/(57-75) 120/62  SpO2:  [93 %-99 %] 99 %  I/O last 3 completed shifts:  In: 915 [P.O.:723; I.V.:192]  Out: 850 [Urine:850]    Constitutional: NAD  Respiratory: Clear toascultation bilaterally.  No increased respiratory effort.   Cardiovascular: Regularrate and rhythm.  No murmurs rubs or gallops heard.   GI: Abdomen Soft, non-tender.  No masses, rebound or guarding.   MSK:   Minimally tender over anterior right rib cage at or near 4th/5th rib fx.  Tender over muscular regions of back.  No midline tenerness.    Discharge Disposition   Discharged to home  Condition at discharge: Stable    Consultations This Hospital Stay   None    Time Spent on this Encounter   I, Bharat Jaramillo, personally saw the patient today and spent less than or equal to 30 minutes discharging this patient.    Discharge Orders     Reason for your hospital stay   You fell off the deck and broke two ribs.  We wanted to monitor you due to your coumadin and your hemoglobin stayed stable when accounting for a little dilution with IVF.  As we mentioned you need to come back emergently with any bloody cough or acute shortness of breath.     Follow-up and recommended labs and tests    Follow up with primary care provider, Donte Mckeon, within 7 days to evaluate after hospitalization.     Activity   Your activity upon discharge: activity as tolerated     Full Code     Diet   Follow this diet upon discharge: Orders Placed This Encounter     Regular Diet Adult       Discharge Medications   Current Discharge Medication List      START taking these medications    Details   acetaminophen (TYLENOL) 325 MG tablet Take 2 tablets (650 mg) by mouth every 4 hours as needed for mild pain  Qty: 100 tablet, Refills: 1    Associated Diagnoses: Closed fracture of multiple ribs of right side, initial encounter         CONTINUE these medications which have NOT CHANGED    Details   digoxin (LANOXIN) 250 MCG tablet Take 250 mcg by mouth daily      lisinopril (PRINIVIL/ZESTRIL) 20 MG tablet Take 20 mg by mouth daily      metoprolol succinate (TOPROL-XL) 50 MG 24 hr tablet Take 50 mg by mouth once daily.      Tadalafil, PAH, (ADCIRCA PO) Take 20 mg by mouth 2 times daily      torsemide (DEMADEX) 20 MG tablet Take one tablet daily one day and 2 on the opposite day to alternate every other day.      warfarin (COUMADIN) 3 MG tablet TAKE 9MG (3  TABS) X1 DAY/WEEK, AND 6MG (2 TABS) X6DAYS/WEEK.  Qty: 180 tablet, Refills: 0    Associated Diagnoses: Chronic atrial fibrillation (H)           Allergies   No Known Allergies  Data   Most Recent 3 CBC's:  Recent Labs   Lab Test  05/11/18   1010  05/11/18   0424  05/10/18   1420  12/06/17   0955   WBC   --   5.7  8.6   --    HGB  12.1*  10.7*  12.8*  13.7   MCV   --   105*  106*  106*   PLT   --   125*  146*  147      Most Recent 3 BMP's:  Recent Labs   Lab Test  05/10/18   1420  12/06/17   1015  10/16/17   1029   NA  140  140  139   POTASSIUM  4.5  3.9  4.3   CHLORIDE  103  103  102   CO2  31  28  29   BUN  40*  38*  47*   CR  1.52*  1.43*  1.56*   ANIONGAP  6   --    --    ZACHARIAH  9.4  9.1  9.6   GLC  127*  106*  110*     Most Recent 2 LFT's:  Recent Labs   Lab Test  12/06/17   1015   ALKPHOS  74   BILITOTAL  0.9     Most Recent INR's and Anticoagulation Dosing History:  Anticoagulation Dose History     Recent Dosing and Labs Latest Ref Rng & Units 11/3/2017 5/8/2018 5/10/2018    INR 0 - 1.3 1.86(H) - 2.37(H)    INR 0.86 - 1.14 - 2.6(A) -        Most Recent 3 Troponin's:No lab results found.  Most Recent Cholesterol Panel:  Recent Labs   Lab Test  12/06/17   1015   LDL  74   HDL  61   TRIG  84     Most Recent 6 Bacteria Isolates From Any Culture (See EPIC Reports for Culture Details):No lab results found.  Most Recent TSH, T4 and A1c Labs:No lab results found.  Results for orders placed or performed during the hospital encounter of 05/10/18   CT Head w/o Contrast    Narrative    PROCEDURE: CT HEAD W/O CONTRAST     HISTORY: fell backwards off 3 foot deck; .    COMPARISON: None.    TECHNIQUE:  Helical images of the head from the foramen magnum to the  vertex were obtained without contrast.    FINDINGS: The ventricles and sulci are prominent, compatible with  mild, generalized volume loss. No acute intracranial hemorrhage, mass  effect, midline shift, hydrocephalus or basilar cystern effacement are  present.    The  grey-white matter interface is preserved.     The calvarium is intact. The mastoid air cells are clear.  The  visualized paranasal sinuses are clear.      Impression    IMPRESSION: No CT evidence of an acute intracranial process.      CHARI KITCHEN MD   CT Cervical Spine w/o Contrast    Narrative    PROCEDURE: CT CERVICAL SPINE W/O CONTRAST     HISTORY: fell backwards off three foot deck; .    TECHNIQUE: Helical noncontrast CT images of the cervical spine.    COMPARISON: None.    FINDINGS:     No acute fracture is identified. The vertebral bodies are normal in  height. Trace degenerative anterolisthesis of C3 on C4 is present. The  cervical lordosis is straightened. The C1-2 articulation and the  craniocervical junction are intact.     Focal degenerative changes with moderate to severe disc height loss  are present at C4-5, C5-6 and C6-7. No severe spinal stenosis is  present. Scattered foraminal stenoses are present.      Impression    IMPRESSION: No evidence of acute cervical spine fracture.    CHARI KITCHEN MD   CT Thoracic Spine w/o Contrast    Narrative    PROCEDURE: CT THORACIC SPINE W/O CONTRAST 5/10/2018 2:54 PM    HISTORY: fell three feet backwards off deck onto back;     COMPARISONS: None.    TECHNIQUE: CT images of the thoracic spine were obtained with sagittal  and coronal reformatted images reviewed.    FINDINGS: There is slight dextroscoliosis. Alignment is otherwise  maintained. Vertebral body heights are maintained. There is mild disc  space narrowing and degenerative endplate changes at multiple levels.  No acute fracture or subluxation.    There are fractures of the right anterior fourth and fifth ribs, only  partially assessed.    There is mild atelectasis in the lung bases. Prevertebral soft tissues  are otherwise unremarkable.      Impression    IMPRESSION:  1. No acute fracture or subluxation in the thoracic spine.  2. Right anterior fourth and fifth rib fractures, only  partially  visualized. These could be acute or chronic. Suggest correlation with  tenderness.    RODRIGUEZ BAILON MD   CT Lumbar Spine w/o Contrast    Narrative    PROCEDURE: CT LUMBAR SPINE W/O CONTRAST 5/10/2018 3:02 PM    HISTORY: fell backwards 3 feet off deck;     COMPARISONS: None.    TECHNIQUE: CT images of the lumbar spine were obtained with sagittal  and coronal reformatted images reviewed.    FINDINGS:     There is moderate dextroscoliosis. There is also mild anterolisthesis  of L5 relative to S1, likely degenerative. Vertebral body heights are  maintained. Disc spaces are narrowed at all levels with associated  degenerative endplate changes and osteophytes. There is also  multilevel facet arthrosis and multilevel neural foraminal narrowing.  No acute fracture or subluxation.          Impression    IMPRESSION: No acute fracture or subluxation in the lumbar spine.    RODRIGUEZ BAILON MD   XR Pelvis 1/2 Views    Narrative    PROCEDURE: XR PELVIS 1/2 VW 5/10/2018 2:54 PM    HISTORY: fell backwards off deck 3 feet high - on coumadin;     COMPARISONS: None.    TECHNIQUE: Pelvis one view    FINDINGS: There are moderate degenerative changes of both hips. No  acute fracture or dislocation.         Impression    IMPRESSION: No acute fracture.    RODRIGUEZ BAILON MD   XR Elbow Right G/E 3 Views    Narrative    PROCEDURE:  XR ELBOW RT G/E 3 VW    HISTORY: fell backwards off deck 3 feet;     COMPARISON:  None.    TECHNIQUE:  3 views of the right elbow were obtained.    FINDINGS:  No fracture or dislocation is identified. The joint spaces  are preserved.        Impression    IMPRESSION: No acute fracture.      RODRIGUEZ BAILON MD   XR Chest 1 View    Narrative    PROCEDURE:  XR CHEST 1 VW    HISTORY:  fell from 3 foot deck backwards; .     COMPARISON:  None.    FINDINGS:   The cardiac silhouette is mildly enlarged. The pulmonary vasculature  is mildly congested. There are airspace opacities in the lung bases  bilaterally. No  pleural effusion or pneumothorax. There are  postoperative changes of previous median sternotomy.      Impression    IMPRESSION:  Mild congestive heart failure.      RODRIGUEZ BAILON MD

## 2018-05-17 ENCOUNTER — ANTICOAGULATION THERAPY VISIT (OUTPATIENT)
Dept: ANTICOAGULATION | Facility: OTHER | Age: 73
End: 2018-05-17
Attending: FAMILY MEDICINE
Payer: MEDICARE

## 2018-05-17 DIAGNOSIS — Z79.01 LONG-TERM (CURRENT) USE OF ANTICOAGULANTS: ICD-10-CM

## 2018-05-17 DIAGNOSIS — I48.20 CHRONIC ATRIAL FIBRILLATION (H): ICD-10-CM

## 2018-05-17 LAB — INR POINT OF CARE: 2.2 (ref 0.86–1.14)

## 2018-05-17 PROCEDURE — 85610 PROTHROMBIN TIME: CPT | Mod: QW,ZL

## 2018-05-17 NOTE — PROGRESS NOTES
ANTICOAGULATION FOLLOW-UP CLINIC VISIT    Patient Name:  Angel Garrett  Date:  5/17/2018  Contact Type:  Face to Face    SUBJECTIVE:     Patient Findings     Positives Hospital admission (Patient hospitalized for broken ribs)           OBJECTIVE    INR Protime   Date Value Ref Range Status   05/17/2018 2.2 (A) 0.86 - 1.14 Final       ASSESSMENT / PLAN  INR assessment THER    Recheck INR In: 4 WEEKS    INR Location Clinic      Anticoagulation Summary as of 5/17/2018     INR goal 2.0-3.0   Today's INR 2.2   Maintenance plan 9 mg (3 mg x 3) on Sat; 6 mg (3 mg x 2) all other days   Full instructions 9 mg on Sat; 6 mg all other days   Weekly total 45 mg   No change documented Ledy Frausto RN   Next INR check 6/19/2018   Target end date Indefinite    Indications   Long-term (current) use of anticoagulants [Z79.01] [Z79.01]  Chronic atrial fibrillation (H) [I48.2]         Anticoagulation Episode Summary     INR check location     Preferred lab     Send INR reminders to  INR    Comments       Anticoagulation Care Providers     Provider Role Specialty Phone number    Donte Mckeon MD [facility] [phone]            See the Encounter Report to view Anticoagulation Flowsheet and Dosing Calendar (Go to Encounters tab in chart review, and find the Anticoagulation Therapy Visit)        Ledy Frausto RN

## 2018-05-17 NOTE — MR AVS SNAPSHOT
Angel Garrett   5/17/2018 1:15 PM   Anticoagulation Therapy Visit    Description:  72 year old male   Provider:  [facility]   Department:  [facility]           INR as of 5/17/2018     Today's INR 2.2      Anticoagulation Summary as of 5/17/2018     INR goal 2.0-3.0   Today's INR 2.2   Full instructions 9 mg on Sat; 6 mg all other days   Next INR check 6/19/2018    Indications   Long-term (current) use of anticoagulants [Z79.01] [Z79.01]  Chronic atrial fibrillation (H) [I48.2]         Your next Anticoagulation Clinic appointment(s)     May 17, 2018  1:15 PM CDT   [facility] with  ANTI [facility]AG 1   [facility]    [address]  [place] [address]   [phone]            Jun 19, 2018 10:15 AM CDT   Anticoagulation Visit with  ANTI COAG 1   [facility]    [address]  [place] [address]   [phone]              May 2018 Details    Sun Mon Tue Wed Thu Fri Sat       1               2               3               4               5                 6               7               8               9               10               11               12                 13               14               15               16               17      6 mg   See details      18      6 mg         19      9 mg           20      6 mg         21      6 mg         22      6 mg         23      6 mg         24      6 mg         25      6 mg         26      9 mg           27      6 mg         28      6 mg         29      6 mg         30      6 mg         31      6 mg            Date Details   05/17 This INR check               How to take your warfarin dose     To take:  6 mg Take 2 of the 3 mg tablets.    To take:  9 mg Take 3 of the 3 mg tablets.           June 2018 Details    Sun Mon Tue Wed Thu Fri Sat          1      6 mg         2      9 mg           3      6 mg         4       6 mg         5      6 mg         6      6 mg         7      6 mg         8      6 mg         9      9 mg           10      6 mg         11      6 mg         12      6 mg         13      6 mg         14      6 mg         15      6 mg         16      9 mg           17      6 mg         18      6 mg         19            20               21               22               23                 24               25               26               27               28               29               30                Date Details   No additional details    Date of next INR:  6/19/2018         How to take your warfarin dose     To take:  6 mg Take 2 of the 3 mg tablets.    To take:  9 mg Take 3 of the 3 mg tablets.

## 2018-05-22 ENCOUNTER — OFFICE VISIT (OUTPATIENT)
Dept: FAMILY MEDICINE | Facility: OTHER | Age: 73
End: 2018-05-22
Attending: FAMILY MEDICINE
Payer: COMMERCIAL

## 2018-05-22 VITALS
SYSTOLIC BLOOD PRESSURE: 124 MMHG | HEART RATE: 64 BPM | DIASTOLIC BLOOD PRESSURE: 56 MMHG | WEIGHT: 211 LBS | BODY MASS INDEX: 33.55 KG/M2

## 2018-05-22 DIAGNOSIS — M16.11 PRIMARY OSTEOARTHRITIS OF RIGHT HIP: ICD-10-CM

## 2018-05-22 DIAGNOSIS — S22.41XD CLOSED FRACTURE OF MULTIPLE RIBS OF RIGHT SIDE WITH ROUTINE HEALING, SUBSEQUENT ENCOUNTER: Primary | ICD-10-CM

## 2018-05-22 DIAGNOSIS — M17.5 OTHER UNILATERAL SECONDARY OSTEOARTHRITIS OF KNEE: ICD-10-CM

## 2018-05-22 PROCEDURE — G0463 HOSPITAL OUTPT CLINIC VISIT: HCPCS

## 2018-05-22 PROCEDURE — 99214 OFFICE O/P EST MOD 30 MIN: CPT | Performed by: FAMILY MEDICINE

## 2018-05-22 ASSESSMENT — PAIN SCALES - GENERAL: PAINLEVEL: MILD PAIN (2)

## 2018-05-22 NOTE — MR AVS SNAPSHOT
After Visit Summary   5/22/2018    Angel Garrett    MRN: [id number]           Patient Information     Date Of Birth          1945        Visit Information        Provider Department      5/22/2018 2:00 PM Donte Mckeon MD [facility]        Today's Diagnoses     Closed fracture of multiple ribs of right side with routine healing, subsequent encounter    -  1    Primary osteoarthritis of right hip        Other unilateral secondary osteoarthritis of knee            Follow-ups after your visit        Your next 10 appointments already scheduled     Jun 19, 2018 10:15 AM CDT   Anticoagulation Visit with [facility] ANTI COAG 1   [facility]    [address]  [place] [id number] [phone]              Future tests that were ordered for you today     Open Future Orders        Priority Expected Expires Ordered    US Joint Injection Aspiration Major Right Routine  5/22/2019 5/22/2018            Who to contact     If you have questions or need follow up information about today's clinic visit or your schedule please contact [facility] directly at [phone].  Normal or non-critical lab and imaging results will be communicated to you by Interglosshart, letter or phone within 4 business days after the clinic has received the results. If you do not hear from us within 7 days, please contact the clinic through lightt or phone. If you have a critical or abnormal lab result, we will notify you by phone as soon as possible.  Submit refill requests through Vidimax or call your pharmacy and they will forward the refill request to us. Please allow 3 business days for your refill to be completed.          Additional Information About Your Visit        Interglosshart Information     Vidimax lets you send messages to your doctor, view your test results, renew your prescriptions, schedule appointments and more. To sign  "up, go to [email or web address] . Click on \"Log in\" on the left side of the screen, which will take you to the Welcome page. Then click on \"Sign up Now\" on the right side of the page.     You will be asked to enter the access code listed below, as well as some personal information. Please follow the directions to create your username and password.     Your access code is: [email or web address]  Expires: 2018 11:01 AM     Your access code will  in 90 days. If you need help or a new code, please call your [facility] clinic or [phone].        Care EveryWhere ID     This is your Care EveryWhere ID. This could be used by other organizations to access your [facility] medical records  [id number]        Your Vitals Were     Pulse BMI (Body Mass Index)                64 33.55 kg/m2           Blood Pressure from Last 3 Encounters:   18 124/56   18 126/62   17 136/68    Weight from Last 3 Encounters:   18 211 lb (95.7 kg)   18 210 lb 6.4 oz (95.4 kg)   17 208 lb (94.3 kg)                 Today's Medication Changes          These changes are accurate as of 18  5:59 PM.  If you have any questions, ask your nurse or doctor.               These medicines have changed or have updated prescriptions.        Dose/Directions    acetaminophen 325 MG tablet   Commonly known as:  TYLENOL   This may have changed:  when to take this   Used for:  Closed fracture of multiple ribs of right side, initial encounter        Dose:  650 mg   Take 2 tablets (650 mg) by mouth every 4 hours as needed for mild pain   Quantity:  100 tablet   Refills:  1                Primary Care Provider Office Phone # Fax #    Donte Mckeon [phone] [address] [facility] [address]        Equal Access to Services     CROW GAN AH: prem Lockwood, sammi galeana. [facility] [phone].    ATENCIÓN: " Si jenna vaz, tiene a dennis disposición servicios gratuitos de asistencia lingüística. Juan dhillon [phone].    We comply with applicable federal civil rights laws and [place] laws. We do not discriminate on the basis of race, color, national origin, age, disability, sex, sexual orientation, or gender identity.            Thank you!     Thank you for choosing [facility] AND [facility]  for your care. Our goal is always to provide you with excellent care. Hearing back from our patients is one way we can continue to improve our services. Please take a few minutes to complete the written survey that you may receive in the mail after your visit with us. Thank you!             Your Updated Medication List - Protect others around you: Learn how to safely use, store and throw away your medicines at www.disposemymeds.org.          This list is accurate as of 5/22/18  5:59 PM.  Always use your most recent med list.                   Brand Name Dispense Instructions for use Diagnosis    acetaminophen 325 MG tablet    TYLENOL    100 tablet    Take 2 tablets (650 mg) by mouth every 4 hours as needed for mild pain    Closed fracture of multiple ribs of right side, initial encounter       ADCIRCA PO      Take 20 mg by mouth 2 times daily        digoxin 250 MCG tablet    LANOXIN     Take 250 mcg by mouth daily        lisinopril 20 MG tablet    PRINIVIL/ZESTRIL     Take 20 mg by mouth daily        metoprolol succinate 50 MG 24 hr tablet    TOPROL-XL     Take 50 mg by mouth once daily.        torsemide 20 MG tablet    DEMADEX     Take one tablet daily one day and 2 on the opposite day to alternate every other day.        warfarin 3 MG tablet    COUMADIN    180 tablet    TAKE 9MG (3 TABS) X1 DAY/WEEK, AND 6MG (2 TABS) X6DAYS/WEEK.    Chronic atrial fibrillation (H)

## 2018-05-22 NOTE — PROGRESS NOTES
"Nursing Notes:   Cyndee Berry LPN  5/22/2018  2:04 PM  Signed  He is here today for a hospital follow up.  Cyndee Berry LPN..................5/22/2018   1:58 PM    SUBJECTIVE:  Angel Garrett  is a 72 year old male whom I saw to establish care back in December with the following history: \"He previously had seen Dr. Miles. He has pulmonary arterial hypertension, congenital heart disease and chronic atrial fibrillation. He is followed at [facility] and is currently on torsemide. He last saw Dr. Miles in October and on laboratory monitoring was felt to be a little over diuresed and so his torsemide was decreased to 20 mg alternating with 40 mg every other day. He is also on warfarin and is on digoxin and metoprolol for rate control. He is on tadalafil 40 mg daily along with lisinopril 20 mg daily. He seems to be tolerating those medications.He has been having pain in his right hip. It aches a lot. It bothers walking. He limps a bit. He has less pain at rest. He hasn't taken anything for it. He has other joint pains.      He is up-to-date on health maintenance issues. He had colonography in 2009[place] He has been steady on coumadin dose for a long time.      He had some nocturia few years ago and was seen in urology, but had no issues and was diagnosed with obstructive sleep apnea and is on CPAP and has no issues. \"    He was recently hospitalized for a fall he had the wind knocked out of him he was kept on observation overnight, and had no signs or symptoms of pneumothorax or hemopneumothorax.  His hospital course is as follows:     \"Discharge Diagnoses     Active Problems:    Chronic atrial fibrillation (H) (2/11/2018)    Hypertension (12/5/2013)    Obstructive sleep apnea (11/28/2016)    Pulmonary hypertension (5/15/2017)    Fall at home (5/10/2018)    Long term current use of anticoagulant therapy (5/10/2018)    Closed fracture of multiple ribs " "of right side, initial encounter (5/10/2018)           History of Present Illness     Angel Garrett is an 72 year old male who presented with \"Angel Garrett is a 72 year old male who presents to the ED after a fall at home.  Was moving a generator across deck with the hand rail broke and he fell about 2.5ft to the ground.  He did not land on the broken railing.  Landed flat on his back on the ground.  Right elbow may have hit a rock.  He had the wind knocked out of him.  He does not think he had any head trauma and no LOC.  Went inside and over the next 3 hours just couldn't get comfortable due to pain in his back and anterior chest wall.  Didn't really feel short of breath but just hurt to take deep inspiration, moreso though to twist.  No anginal equivalents.      Currently he is sitting comfortably with no pain.  Just hurts when he moves.  Othewise feels well.  He is hesitant to take any pain medications.      Patient is on coumadin for atrial fibrillation.  Had INR 5/8 that was 2.6.  No history of black or bloody stools.  No abdominal pain.        No palpitations, taking medicine as prescribed.  Currently not short of breath.  Does use CPAP for JANESSA but reports he feels better now sitting up reclined and does not want wife to bring in CPAP tonight as he reports he doesn't need it if not flat on back.\"        Hospital Course     Angel Garrett was admitted on 5/10/2018.  He was observed overnight and had no episodes of hemoptysis or acute shortness of breath.  His Hgb was a bit lower in the morning likely due to dilution but then was back up to 12.1 on discharge.  His pain on admission corresponded to rib fracutures as well as muscular back pain.  On discharge the anterior rib pain had actually improved significantly but he still had quite a bit of muscular back pain mainly on the right side.  He did not use narcotics while here, used tylenol and plans to continue with ice, rest and tylenol as needed at home.  " "He will return emergently with any s/s of pneumothorax or hemopneumothorax or any other acute issues.     # Discharge Pain Plan:   - During his hospitalization, Angel experienced pain due to recent fall.  The pain plan for discharge was discussed with Angel and the plan was created in a collaborative fashion.    - Tylenol, pt declines narcotics.\"    He was on the deck and leaned back against a rail and landed on his back after falling about 3 feet and having the wind knocked out of him and fractured his ribs.  A few hours later he went in. His right elbow hit on a rock and he had a hematoma.      He has had a bit of cough from spring allergies. Yesterday he pulled on a lawn tiller and that hurt a lot.     We had sent him to Dr. García at [place] for his hip. He has had right hip pain.     Past Medical, Family, and Social History reviewed and updated as noted below.   ROS is negative except as noted above       No Known Allergies,   Family History   Problem Relation Age of Onset     HEART DISEASE Mother      Heart Disease     HEART DISEASE Father      Heart Disease,CHF     Other - See Comments Father      PUD     Other - See Comments Brother      Liver or kidney cancer     Family History Negative Sister      Good Health     DIABETES Brother      Diabetes     Family History Negative Brother      Good Health     Family History Negative Brother      Good Health     Family History Negative Brother      Good Health   ,   Current Outpatient Prescriptions   Medication     acetaminophen (TYLENOL) 325 MG tablet     digoxin (LANOXIN) 250 MCG tablet     lisinopril (PRINIVIL/ZESTRIL) 20 MG tablet     metoprolol succinate (TOPROL-XL) 50 MG 24 hr tablet     Tadalafil, PAH, (ADCIRCA PO)     torsemide (DEMADEX) 20 MG tablet     warfarin (COUMADIN) 3 MG tablet     No current facility-administered medications for this visit.    , History reviewed. No pertinent past medical history.,   Patient Active Problem List    Diagnosis Date " Noted     Fall at home 05/10/2018     Priority: Medium     Long term current use of anticoagulant therapy 05/10/2018     Priority: Medium     Closed fracture of multiple ribs of right side, initial encounter 05/10/2018     Priority: Medium     Long-term (current) use of anticoagulants [Z79.01] 05/08/2018     Priority: Medium     Chronic atrial fibrillation (H) 02/11/2018     Priority: Medium     Anticoagulation monitoring, INR range 2-3 02/11/2018     Priority: Medium     Dysphonia 05/22/2017     Priority: Medium     Peritonsillar abscess 05/15/2017     Priority: Medium     Pulmonary hypertension 05/15/2017     Priority: Medium     Obstructive sleep apnea 11/28/2016     Priority: Medium     Hx of congenital heart disease 12/05/2013     Priority: Medium     Hyperlipemia 12/05/2013     Priority: Medium     Hypertension 12/05/2013     Priority: Medium     Obesity 12/05/2013     Priority: Medium     Prediabetes 12/05/2013     Priority: Medium   ,   Past Surgical History:   Procedure Laterality Date     COLONOSCOPY      ~2009,Colonography     OTHER SURGICAL HISTORY      2008,,OPEN CHOLECYSTECTOMY     OTHER SURGICAL HISTORY      2002,13306,CARDIAC SURGERY,Pericardial patch, closure of ASD, repair of PFO and repair of anomalous pulmonary venous drainage.     OTHER SURGICAL HISTORY      2016,FML426,CARDIAC CATHETERIZATION     VASECTOMY      No Comments Provided    and   Social History   Substance Use Topics     Smoking status: Former Smoker     Quit date: 9/17/1984     Smokeless tobacco: Never Used     Alcohol use 3.5 oz/week      Comment: Alcoholic Drinks/day: 1-2 drinks a day     OBJECTIVE:  /56 (BP Location: Right arm, Patient Position: Sitting, Cuff Size: Adult Large)  Pulse 64  Wt 211 lb (95.7 kg)  BMI 33.55 kg/m2   EXAM:  Alert and cooperative, no distress.  Lungs are clear, no rales rhonchi or wheezes are heard.  Cardiac irregularly irregular without murmur.  Still tenderness over the area of the  fractured ribs.  Examination of his hips reveals some pain with internal/external rotation in flexion on the right hip but not so much on the left.  Pelvis x-ray was reviewed and he has some significant degenerative change in the right hip joint space.  ASSESSMENT/Plan :    Angel was seen today for hospital f/u.    Diagnoses and all orders for this visit:    Closed fracture of multiple ribs of right side with routine healing, subsequent encounter    Primary osteoarthritis of right hip  -     US Joint Injection Aspiration Major Right; Future    Other unilateral secondary osteoarthritis of knee   -     US Joint Injection Aspiration Major Right; Future    Discussed the expected course of his rib fractures and modification of activity and pain control with ice and anti-inflammatories and Tylenol.    Discussed proceeding with image guided injection of steroid into his right hip intra-articularly while he is waiting to see the orthopedic surgeon male which may not be until August.  After discussion with patient and his wife they like to proceed with same.    A total of 25 minutes was spent with the patient, greater than 50% of the time was spent in counseling/discussion of the aforementioned concerns.     Donte Mckeon MD

## 2018-05-22 NOTE — NURSING NOTE
He is here today for a hospital follow up.  Cyndee Berry LPN..................5/22/2018   1:58 PM

## 2018-05-30 ENCOUNTER — ANTICOAGULATION THERAPY VISIT (OUTPATIENT)
Dept: ANTICOAGULATION | Facility: OTHER | Age: 73
End: 2018-05-30
Attending: FAMILY MEDICINE
Payer: MEDICARE

## 2018-05-30 ENCOUNTER — HOSPITAL ENCOUNTER (OUTPATIENT)
Dept: GENERAL RADIOLOGY | Facility: OTHER | Age: 73
Discharge: HOME | End: 2018-05-30
Attending: FAMILY MEDICINE | Admitting: FAMILY MEDICINE
Payer: MEDICARE

## 2018-05-30 DIAGNOSIS — M17.5 OTHER UNILATERAL SECONDARY OSTEOARTHRITIS OF KNEE: ICD-10-CM

## 2018-05-30 DIAGNOSIS — Z79.01 LONG-TERM (CURRENT) USE OF ANTICOAGULANTS: ICD-10-CM

## 2018-05-30 DIAGNOSIS — I48.20 CHRONIC ATRIAL FIBRILLATION (H): ICD-10-CM

## 2018-05-30 DIAGNOSIS — M16.11 PRIMARY OSTEOARTHRITIS OF RIGHT HIP: ICD-10-CM

## 2018-05-30 LAB — INR POINT OF CARE: 1.5 (ref 0.86–1.14)

## 2018-05-30 PROCEDURE — 25500064 ZZH RX 255 OP 636: Performed by: RADIOLOGY

## 2018-05-30 PROCEDURE — 77002 NEEDLE LOCALIZATION BY XRAY: CPT

## 2018-05-30 PROCEDURE — 85610 PROTHROMBIN TIME: CPT | Mod: QW,ZL

## 2018-05-30 PROCEDURE — 25000125 ZZHC RX 250: Performed by: RADIOLOGY

## 2018-05-30 PROCEDURE — 25000128 H RX IP 250 OP 636

## 2018-05-30 RX ORDER — BUPIVACAINE HYDROCHLORIDE 5 MG/ML
2 INJECTION, SOLUTION PERINEURAL ONCE
Status: COMPLETED | OUTPATIENT
Start: 2018-05-30 | End: 2018-05-30

## 2018-05-30 RX ORDER — BETAMETHASONE SODIUM PHOSPHATE AND BETAMETHASONE ACETATE 3; 3 MG/ML; MG/ML
12 INJECTION, SUSPENSION INTRA-ARTICULAR; INTRALESIONAL; INTRAMUSCULAR; SOFT TISSUE ONCE
Status: COMPLETED | OUTPATIENT
Start: 2018-05-30 | End: 2018-05-30

## 2018-05-30 RX ADMIN — BETAMETHASONE SODIUM PHOSPHATE AND BETAMETHASONE ACETATE 12 MG: 3; 3 INJECTION, SUSPENSION INTRA-ARTICULAR; INTRALESIONAL; INTRAMUSCULAR; SOFT TISSUE at 13:52

## 2018-05-30 RX ADMIN — LIDOCAINE HYDROCHLORIDE 2 ML: 10 INJECTION, SOLUTION INFILTRATION; PERINEURAL at 13:52

## 2018-05-30 RX ADMIN — BUPIVACAINE HYDROCHLORIDE 10 MG: 5 INJECTION, SOLUTION PERINEURAL at 13:52

## 2018-05-30 RX ADMIN — IOHEXOL 2 ML: 240 INJECTION, SOLUTION INTRATHECAL; INTRAVASCULAR; INTRAVENOUS; ORAL at 13:52

## 2018-05-30 NOTE — MR AVS SNAPSHOT
Angel Garrett   5/30/2018 10:00 AM   Anticoagulation Therapy Visit    Description:  72 year old male   Provider:  [facility]   Department:  [facility]           INR as of 5/30/2018     Today's INR 1.5!      Anticoagulation Summary as of 5/30/2018     INR goal 2.0-3.0   Today's INR 1.5!   Full warfarin instructions 9 mg on Sat; 6 mg all other days   Next INR check 6/19/2018    Indications   Long-term (current) use of anticoagulants [Z79.01] [Z79.01]  Chronic atrial fibrillation (H) [I48.2]         Description     Continue same dose and recheck in 2-3 weeks. ...............Saba Honeycutt RN    5/30/2018    9:44 AM        Your next Anticoagulation Clinic appointment(s)     May 30, 2018 10:00 AM CDT   Anticoagulation Visit with [facility]   [facility] ([facility])    [address]  [place] [facility] [address]   [phone]            Jun 19, 2018 10:15 AM CDT   Anticoagulation Visit with  ANTI COAG 1   [facility] ([facility])    [address]  [place] [address]   [phone]              May 2018 Details    Sun Mon Tue Wed Thu Fri Sat       1               2               3               4               5                 6               7               8               9               10               11               12                 13               14               15               16               17               18               19                 20               21               22               23               24               25               26                 27               28               29               30      6 mg   See details      31      6 mg            Date Details   05/30 This INR check               How to take your warfarin dose     To take:  6 mg Take 2 of the 3 mg tablets.           June 2018 Details    Sun Mon Tue Wed Thu Fri Sat          1      6 mg         2       9 mg           3      6 mg         4      6 mg         5      6 mg         6      6 mg         7      6 mg         8      6 mg         9      9 mg           10      6 mg         11      6 mg         12      6 mg         13      6 mg         14      6 mg         15      6 mg         16      9 mg           17      6 mg         18      6 mg         19            20               21               22               23                 24               25               26               27               28               29               30                Date Details   No additional details    Date of next INR:  6/19/2018         How to take your warfarin dose     To take:  6 mg Take 2 of the 3 mg tablets.    To take:  9 mg Take 3 of the 3 mg tablets.

## 2018-05-30 NOTE — PROGRESS NOTES
ANTICOAGULATION FOLLOW-UP CLINIC VISIT    Patient Name:  Angel Garrett  Date:  5/30/2018  Contact Type:  Face to Face    SUBJECTIVE:     Patient Findings     Positives Intentional hold of therapy (having injection today holding dose x 3 days)           OBJECTIVE    INR Protime   Date Value Ref Range Status   05/30/2018 1.5 (A) 0.86 - 1.14 Final       ASSESSMENT / PLAN  INR assessment SUB    Recheck INR In: 2 WEEKS    INR Location Clinic      Anticoagulation Summary as of 5/30/2018     INR goal 2.0-3.0   Today's INR 1.5!   Warfarin maintenance plan 9 mg (3 mg x 3) on Sat; 6 mg (3 mg x 2) all other days   Full warfarin instructions 9 mg on Sat; 6 mg all other days   Weekly warfarin total 45 mg   No change documented Saba Honeycutt, RN   Next INR check 6/19/2018   Target end date Indefinite    Indications   Long-term (current) use of anticoagulants [Z79.01] [Z79.01]  Chronic atrial fibrillation (H) [I48.2]         Anticoagulation Episode Summary     INR check location     Preferred lab     Send INR reminders to  INR    Comments       Anticoagulation Care Providers     Provider Role Specialty Phone number    Donte Mckeon MD [facility] [phone]            See the Encounter Report to view Anticoagulation Flowsheet and Dosing Calendar (Go to Encounters tab in chart review, and find the Anticoagulation Therapy Visit)        Saba Honeycutt, RN

## 2018-06-20 ENCOUNTER — ANTICOAGULATION THERAPY VISIT (OUTPATIENT)
Dept: ANTICOAGULATION | Facility: OTHER | Age: 73
End: 2018-06-20
Attending: FAMILY MEDICINE
Payer: MEDICARE

## 2018-06-20 DIAGNOSIS — I48.20 CHRONIC ATRIAL FIBRILLATION (H): ICD-10-CM

## 2018-06-20 DIAGNOSIS — Z79.01 LONG-TERM (CURRENT) USE OF ANTICOAGULANTS: ICD-10-CM

## 2018-06-20 LAB — INR POINT OF CARE: 2.2 (ref 0.86–1.14)

## 2018-06-20 PROCEDURE — 85610 PROTHROMBIN TIME: CPT | Mod: QW

## 2018-06-20 NOTE — PROGRESS NOTES
ANTICOAGULATION FOLLOW-UP CLINIC VISIT    Patient Name:  Angel Garrett  Date:  6/20/2018  Contact Type:  Face to Face    SUBJECTIVE:        OBJECTIVE    INR Protime   Date Value Ref Range Status   06/20/2018 2.2 (A) 0.86 - 1.14 Final       ASSESSMENT / PLAN  No question data found.  Anticoagulation Summary as of 6/20/2018     INR goal 2.0-3.0   Today's INR 2.2   Warfarin maintenance plan 9 mg (3 mg x 3) on Sat; 6 mg (3 mg x 2) all other days   Full warfarin instructions 9 mg on Sat; 6 mg all other days   Weekly warfarin total 45 mg   No change documented Dimple Gallagher, RN   Next INR check 8/1/2018   Target end date Indefinite    Indications   Long-term (current) use of anticoagulants [Z79.01] [Z79.01]  Chronic atrial fibrillation (H) [I48.2]         Anticoagulation Episode Summary     INR check location     Preferred lab     Send INR reminders to  INR    Comments       Anticoagulation Care Providers     Provider Role Specialty Phone number    Donte Mckeon MD [facility] [phone]            See the Encounter Report to view Anticoagulation Flowsheet and Dosing Calendar (Go to Encounters tab in chart review, and find the Anticoagulation Therapy Visit)        Dimple Gallagher RN

## 2018-06-20 NOTE — MR AVS SNAPSHOT
Angel Garrett   6/20/2018 3:15 PM   Anticoagulation Therapy Visit    Description:  72 year old male   Provider:  [facility]   Department:  [facility]           INR as of 6/20/2018     Today's INR 2.2      Anticoagulation Summary as of 6/20/2018     INR goal 2.0-3.0   Today's INR 2.2   Full warfarin instructions 9 mg on Sat; 6 mg all other days   Next INR check 8/1/2018    Indications   Long-term (current) use of anticoagulants [Z79.01] [Z79.01]  Chronic atrial fibrillation (H) [I48.2]         Description     Continue current dose and recheck 8/1/2018.  Dimple Gallagher RN  ....................  6/20/2018   9:54 AM        Your next Anticoagulation Clinic appointment(s)     Jun 20, 2018  3:15 PM CDT   Anticoagulation Visit with [facility] ANTI COAG 1   [facility] ([facility])    [address]  [place] [facility] [place] [address]   [phone]              June 2018 Details    Sun Mon Tue Wed Thu Fri Sat          1               2                 3               4               5               6               7               8               9                 10               11               12               13               14               15               16                 17               18               19               20      6 mg   See details      21      6 mg         22      6 mg         23      9 mg           24      6 mg         25      6 mg         26      6 mg         27      6 mg         28      6 mg         29      6 mg         30      9 mg          Date Details   06/20 This INR check               How to take your warfarin dose     To take:  6 mg Take 2 of the 3 mg tablets.    To take:  9 mg Take 3 of the 3 mg tablets.           July 2018 Details    Sun Mon Tue Wed Thu Fri Sat     1      6 mg         2      6 mg         3      6 mg         4      6 mg         5      6 mg         6      6 mg         7      9 mg           8      6 mg         9      6 mg          10      6 mg         11      6 mg         12      6 mg         13      6 mg         14      9 mg           15      6 mg         16      6 mg         17      6 mg         18      6 mg         19      6 mg         20      6 mg         21      9 mg           22      6 mg         23      6 mg         24      6 mg         25      6 mg         26      6 mg         27      6 mg         28      9 mg           29      6 mg         30      6 mg         31      6 mg              Date Details   No additional details            How to take your warfarin dose     To take:  6 mg Take 2 of the 3 mg tablets.    To take:  9 mg Take 3 of the 3 mg tablets.           August 2018 Details    Sun Mon Tue Wed Thu Fri Sat        1            2               3               4                 5               6               7               8               9               10               11                 12               13               14               15               16               17               18                 19               20               21               22               23               24               25                 26               27               28               29               30               31                 Date Details   No additional details    Date of next INR:  8/1/2018         How to take your warfarin dose     To take:  6 mg Take 2 of the 3 mg tablets.

## 2018-07-28 DIAGNOSIS — I48.20 CHRONIC ATRIAL FIBRILLATION (H): ICD-10-CM

## 2018-07-30 RX ORDER — WARFARIN SODIUM 3 MG/1
TABLET ORAL
Qty: 180 TABLET | Refills: 1 | Status: SHIPPED | OUTPATIENT
Start: 2018-07-30 | End: 2018-09-12

## 2018-07-30 NOTE — TELEPHONE ENCOUNTER
Prescription approved per [facility] Refill Protocol.  Saba Honeycutt RN    7/30/2018  10:25 AM

## 2018-08-01 ENCOUNTER — ANTICOAGULATION THERAPY VISIT (OUTPATIENT)
Dept: ANTICOAGULATION | Facility: OTHER | Age: 73
End: 2018-08-01
Attending: FAMILY MEDICINE
Payer: MEDICARE

## 2018-08-01 DIAGNOSIS — Z79.01 LONG-TERM (CURRENT) USE OF ANTICOAGULANTS: ICD-10-CM

## 2018-08-01 DIAGNOSIS — I48.20 CHRONIC ATRIAL FIBRILLATION (H): ICD-10-CM

## 2018-08-01 LAB — INR POINT OF CARE: 2.7 (ref 2–3)

## 2018-08-01 PROCEDURE — 85610 PROTHROMBIN TIME: CPT | Mod: QW,ZL

## 2018-08-01 NOTE — MR AVS SNAPSHOT
Angel Garrett   8/1/2018 10:30 AM   Anticoagulation Therapy Visit    Description:  72 year old male   Provider:  [facility]   Department:  [facility]           INR as of 8/1/2018     Today's INR 2.7      Anticoagulation Summary as of 8/1/2018     INR goal 2.0-3.0   Today's INR 2.7   Full warfarin instructions 9 mg on Sat; 6 mg all other days   Next INR check 9/12/2018    Indications   Long-term (current) use of anticoagulants [Z79.01] [Z79.01]  Chronic atrial fibrillation (H) [I48.2]         Description     Continue same dose of Coumadin/Warfarinand recheck INR in 6 weeks. Izabella Verma ....................  8/1/2018   10:27 AM          Your next Anticoagulation Clinic appointment(s)     Aug 01, 2018 10:30 AM CDT   Anticoagulation Visit with [facility] 2   [facility] ([facility])    [address]  [place] [facility] [address]   [phone]              August 2018 Details    Sun Mon Tue Wed Thu Fri Sat        1      6 mg   See details      2      6 mg         3      6 mg         4      9 mg           5      6 mg         6      6 mg         7      6 mg         8      6 mg         9      6 mg         10      6 mg         11      9 mg           12      6 mg         13      6 mg         14      6 mg         15      6 mg         16      6 mg         17      6 mg         18      9 mg           19      6 mg         20      6 mg         21      6 mg         22      6 mg         23      6 mg         24      6 mg         25      9 mg           26      6 mg         27      6 mg         28      6 mg         29      6 mg         30      6 mg         31      6 mg           Date Details   08/01 This INR check               How to take your warfarin dose     To take:  6 mg Take 2 of the 3 mg tablets.    To take:  9 mg Take 3 of the 3 mg tablets.           September 2018 Details    Sun Mon Tue Wed Thu Fri Sat           1      9 mg           2      6 mg         3       6 mg         4      6 mg         5      6 mg         6      6 mg         7      6 mg         8      9 mg           9      6 mg         10      6 mg         11      6 mg         12            13               14               15                 16               17               18               19               20               21               22                 23               24               25               26               27               28               29                 30                      Date Details   No additional details    Date of next INR:  9/12/2018         How to take your warfarin dose     To take:  6 mg Take 2 of the 3 mg tablets.    To take:  9 mg Take 3 of the 3 mg tablets.

## 2018-08-01 NOTE — PROGRESS NOTES
ANTICOAGULATION FOLLOW-UP CLINIC VISIT    Patient Name:  Angel Garrett  Date:  8/1/2018  Contact Type:  Face to Face    SUBJECTIVE:     Patient Findings     Positives No Problem Findings           OBJECTIVE    INR Protime   Date Value Ref Range Status   08/01/2018 2.7 2.0 - 3.0 Final       ASSESSMENT / PLAN  INR assessment THER    Recheck INR In: 6 WEEKS    INR Location Clinic      Anticoagulation Summary as of 8/1/2018     INR goal 2.0-3.0   Today's INR 2.7   Warfarin maintenance plan 9 mg (3 mg x 3) on Sat; 6 mg (3 mg x 2) all other days   Full warfarin instructions 9 mg on Sat; 6 mg all other days   Weekly warfarin total 45 mg   No change documented Izabella Verma RN   Next INR check 9/12/2018   Target end date Indefinite    Indications   Long-term (current) use of anticoagulants [Z79.01] [Z79.01]  Chronic atrial fibrillation (H) [I48.2]         Anticoagulation Episode Summary     INR check location     Preferred lab     Send INR reminders to  INR    Comments       Anticoagulation Care Providers     Provider Role Specialty Phone number    Donte Mckeon MD [facility] [phone]            See the Encounter Report to view Anticoagulation Flowsheet and Dosing Calendar (Go to Encounters tab in chart review, and find the Anticoagulation Therapy Visit)        Izabella Verma RN

## 2018-09-03 DIAGNOSIS — I48.20 CHRONIC ATRIAL FIBRILLATION (H): Primary | ICD-10-CM

## 2018-09-05 NOTE — TELEPHONE ENCOUNTER
Patient requests new Rx:  Please send Rx Toresemide 20 mg by mouth twice daily quantity of 180.  Last filled 02/06/2018 per [facility] pharmacist.  Tara Sy LPN ............... 9/5/2018, 9:16 AM

## 2018-09-06 RX ORDER — TORSEMIDE 20 MG/1
20 TABLET ORAL 2 TIMES DAILY
Qty: 180 TABLET | Refills: 3 | Status: SHIPPED | OUTPATIENT
Start: 2018-09-06 | End: 2019-09-15

## 2018-09-06 NOTE — TELEPHONE ENCOUNTER
RN refill protocol fails as noted below:     Normal serum creatinine on file in past 12 months           Recent Labs   Lab Test  05/10/18   1420   CR  1.52*        Rx request from pharmacy as per AMBER Whittaker documentation as well as Rx as teed up does not match current Rx in patient's chart as well as current Rx as per Care Everywhere summary. LOV with PCP was on 5/22/18. Use of Rx as requested was noted in office visit notes on that date with no changes and patient was left at current dose of which is noted in his chart. Call placed to patient to discuss change as requested. Patient reports that Dr. Wilder at the [facility] told him that he should take Rx as requested on an as needed basis. The most he takes depending on his swelling is 20 mg twice daily. This is why he is requesting the change in directions. Patient would hate to run out of Rx as requested and if he indeed does take Rx at times twice daily and script doesn't reflect that he may run out before Rx can be filled.     Patient is requesting an updated script to be sent in to [facility] [place] as teed up per AMBER Whittaker. Writer will route Rx request to PCP for his consideration/approval. If PCP does not approve Rx, patient would like a call to discuss.    Unable to complete prescription refill per RN Medication Refill Policy. Constantino Nichols 9/6/2018 11:20 AM

## 2018-09-12 ENCOUNTER — ANTICOAGULATION THERAPY VISIT (OUTPATIENT)
Dept: ANTICOAGULATION | Facility: OTHER | Age: 73
End: 2018-09-12
Attending: FAMILY MEDICINE
Payer: MEDICARE

## 2018-09-12 DIAGNOSIS — I48.20 CHRONIC ATRIAL FIBRILLATION (H): ICD-10-CM

## 2018-09-12 DIAGNOSIS — Z79.01 LONG-TERM (CURRENT) USE OF ANTICOAGULANTS: ICD-10-CM

## 2018-09-12 LAB — INR POINT OF CARE: 2.4 (ref 0.86–1.14)

## 2018-09-12 PROCEDURE — 85610 PROTHROMBIN TIME: CPT | Mod: QW,ZL

## 2018-09-12 RX ORDER — WARFARIN SODIUM 3 MG/1
TABLET ORAL
Qty: 180 TABLET | Refills: 1 | COMMUNITY
Start: 2018-09-12 | End: 2018-10-09

## 2018-09-12 NOTE — PROGRESS NOTES
ANTICOAGULATION FOLLOW-UP CLINIC VISIT    Patient Name:  Angel Garrett  Date:  9/12/2018  Contact Type:  Face to Face    SUBJECTIVE:     Patient Findings     Positives Dental/Other procedures, No Problem Findings    Comments Upcoming hip replacement at the [place] on 10/3/18.  [place] will adjust dose as needed. Ruth Epstein RN on 9/12/2018 at 10:28 AM             OBJECTIVE    INR Protime   Date Value Ref Range Status   09/12/2018 2.4 (A) 0.86 - 1.14 Final       ASSESSMENT / PLAN  INR assessment THER    Recheck INR In: 4 WEEKS    INR Location Clinic      Anticoagulation Summary as of 9/12/2018     INR goal 2.0-3.0   Today's INR 2.4   Warfarin maintenance plan 9 mg (3 mg x 3) on Sat; 6 mg (3 mg x 2) all other days   Full warfarin instructions 9 mg on Sat; 6 mg all other days   Weekly warfarin total 45 mg   No change documented Ruth Epstein, RN   Next INR check 10/10/2018   [place] end date Indefinite    Indications   Long-term (current) use of anticoagulants [Z79.01] [Z79.01]  Chronic atrial fibrillation (H) [I48.2]         Anticoagulation Episode Summary     INR check location     Preferred lab     Send INR reminders to  INR    Comments       Anticoagulation Care Providers     Provider Role Specialty Phone number    Donte Mckeon MD [facility] [phone]            See the Encounter Report to view Anticoagulation Flowsheet and Dosing Calendar (Go to Encounters tab in chart review, and find the Anticoagulation Therapy Visit)        Ruth Epstein RN

## 2018-09-12 NOTE — MR AVS SNAPSHOT
Angel Garrett   9/12/2018 10:30 AM   Anticoagulation Therapy Visit    Description:  72 year old male   Provider:  [facility]   Department:  [facility]           INR as of 9/12/2018     Today's INR 2.4      Anticoagulation Summary as of 9/12/2018     INR goal 2.0-3.0   Today's INR 2.4   Full warfarin instructions 9 mg on Sat; 6 mg all other days   Next INR check 10/10/2018    Indications   Long-term (current) use of anticoagulants [Z79.01] [Z79.01]  Chronic atrial fibrillation (H) [I48.2]         Description     Continue same dose and recheck in 4 weeks. ...............Ruth Epstein RN on 9/12/2018 at 10:25 AM          Your next Anticoagulation Clinic appointment(s)     Sep 12, 2018 10:30 AM CDT   Anticoagulation Visit with [facility]   [facility] ([facility])    [address]  [facility] [address]   [phone]              September 2018 Details    Sun Mon Tue Wed Thu Fri Sat           1                 2               3               4               5               6               7               8                 9               10               11               12      6 mg   See details      13      6 mg         14      6 mg         15      9 mg           16      6 mg         17      6 mg         18      6 mg         19      6 mg         20      6 mg         21      6 mg         22      9 mg           23      6 mg         24      6 mg         25      6 mg         26      6 mg         27      6 mg         28      6 mg         29      9 mg           30      6 mg                Date Details   09/12 This INR check               How to take your warfarin dose     To take:  6 mg Take 2 of the 3 mg tablets.    To take:  9 mg Take 3 of the 3 mg tablets.           October 2018 Details    Sun Mon Tue Wed Thu Fri Sat      1      6 mg         2      6 mg         3      6 mg         4      6 mg         5      6 mg         6      9 mg            7      6 mg         8      6 mg         9      6 mg         10            11               12               13                 14               15               16               17               18               19               20                 21               22               23               24               25               26               27                 28               29               30               31                   Date Details   No additional details    Date of next INR:  10/10/2018         How to take your warfarin dose     To take:  6 mg Take 2 of the 3 mg tablets.    To take:  9 mg Take 3 of the 3 mg tablets.

## 2018-10-08 ENCOUNTER — TELEPHONE (OUTPATIENT)
Dept: FAMILY MEDICINE | Facility: OTHER | Age: 73
End: 2018-10-08

## 2018-10-08 NOTE — TELEPHONE ENCOUNTER
JVC - Patient called and is on his way home from the [facility] for a hip replacement.  They suggest he follows up with his primary as soon as possible as his medications have changed.  He is requesting a work in appointment as soon as possible.  Please call patient with any questions.        Jeri Palm

## 2018-10-08 NOTE — TELEPHONE ENCOUNTER
The patient was given an appointment tomorrow with Donte Mckeon MD.  Cyndee Berry LPN..................10/8/2018   10:28 AM

## 2018-10-09 ENCOUNTER — OFFICE VISIT (OUTPATIENT)
Dept: FAMILY MEDICINE | Facility: OTHER | Age: 73
End: 2018-10-09
Attending: FAMILY MEDICINE
Payer: MEDICARE

## 2018-10-09 VITALS
TEMPERATURE: 97.2 F | HEART RATE: 88 BPM | WEIGHT: 213.6 LBS | RESPIRATION RATE: 20 BRPM | DIASTOLIC BLOOD PRESSURE: 68 MMHG | BODY MASS INDEX: 33.96 KG/M2 | SYSTOLIC BLOOD PRESSURE: 126 MMHG

## 2018-10-09 DIAGNOSIS — D64.9 POSTOPERATIVE ANEMIA: ICD-10-CM

## 2018-10-09 DIAGNOSIS — N17.9 ACUTE KIDNEY INJURY (H): Primary | ICD-10-CM

## 2018-10-09 DIAGNOSIS — Z79.01 LONG TERM CURRENT USE OF ANTICOAGULANT THERAPY: ICD-10-CM

## 2018-10-09 DIAGNOSIS — I48.20 CHRONIC ATRIAL FIBRILLATION (H): ICD-10-CM

## 2018-10-09 DIAGNOSIS — Z79.01 ANTICOAGULATION MONITORING, INR RANGE 2-3: ICD-10-CM

## 2018-10-09 DIAGNOSIS — I10 ESSENTIAL HYPERTENSION: ICD-10-CM

## 2018-10-09 DIAGNOSIS — Z96.641 H/O TOTAL HIP ARTHROPLASTY, RIGHT: ICD-10-CM

## 2018-10-09 LAB
ANION GAP SERPL CALCULATED.3IONS-SCNC: 10 MMOL/L (ref 3–14)
BASOPHILS # BLD AUTO: 0 10E9/L (ref 0–0.2)
BASOPHILS NFR BLD AUTO: 0.5 %
BUN SERPL-MCNC: 60 MG/DL (ref 7–25)
CALCIUM SERPL-MCNC: 8.7 MG/DL (ref 8.6–10.3)
CHLORIDE SERPL-SCNC: 97 MMOL/L (ref 98–107)
CO2 SERPL-SCNC: 26 MMOL/L (ref 21–31)
CREAT SERPL-MCNC: 1.64 MG/DL (ref 0.7–1.3)
DIFFERENTIAL METHOD BLD: ABNORMAL
EOSINOPHIL # BLD AUTO: 0.1 10E9/L (ref 0–0.7)
EOSINOPHIL NFR BLD AUTO: 2.4 %
ERYTHROCYTE [DISTWIDTH] IN BLOOD BY AUTOMATED COUNT: 12.5 % (ref 10–15)
GFR SERPL CREATININE-BSD FRML MDRD: 41 ML/MIN/1.7M2
GLUCOSE SERPL-MCNC: 113 MG/DL (ref 70–105)
HCT VFR BLD AUTO: 30.6 % (ref 40–53)
HGB BLD-MCNC: 10.4 G/DL (ref 13.3–17.7)
IMM GRANULOCYTES # BLD: 0 10E9/L (ref 0–0.4)
IMM GRANULOCYTES NFR BLD: 0.7 %
LYMPHOCYTES # BLD AUTO: 0.9 10E9/L (ref 0.8–5.3)
LYMPHOCYTES NFR BLD AUTO: 16.1 %
MCH RBC QN AUTO: 36.4 PG (ref 26.5–33)
MCHC RBC AUTO-ENTMCNC: 34 G/DL (ref 31.5–36.5)
MCV RBC AUTO: 107 FL (ref 78–100)
MONOCYTES # BLD AUTO: 0.5 10E9/L (ref 0–1.3)
MONOCYTES NFR BLD AUTO: 9 %
NEUTROPHILS # BLD AUTO: 4.1 10E9/L (ref 1.6–8.3)
NEUTROPHILS NFR BLD AUTO: 71.3 %
PLATELET # BLD AUTO: 195 10E9/L (ref 150–450)
POTASSIUM SERPL-SCNC: 4.8 MMOL/L (ref 3.5–5.1)
RBC # BLD AUTO: 2.86 10E12/L (ref 4.4–5.9)
SODIUM SERPL-SCNC: 133 MMOL/L (ref 134–144)
WBC # BLD AUTO: 5.8 10E9/L (ref 4–11)

## 2018-10-09 PROCEDURE — G0463 HOSPITAL OUTPT CLINIC VISIT: HCPCS

## 2018-10-09 PROCEDURE — 85025 COMPLETE CBC W/AUTO DIFF WBC: CPT | Performed by: FAMILY MEDICINE

## 2018-10-09 PROCEDURE — 36415 COLL VENOUS BLD VENIPUNCTURE: CPT | Performed by: FAMILY MEDICINE

## 2018-10-09 PROCEDURE — 99213 OFFICE O/P EST LOW 20 MIN: CPT | Performed by: FAMILY MEDICINE

## 2018-10-09 PROCEDURE — 80048 BASIC METABOLIC PNL TOTAL CA: CPT | Performed by: FAMILY MEDICINE

## 2018-10-09 RX ORDER — AMOXICILLIN 250 MG
1 CAPSULE ORAL
COMMUNITY
Start: 2018-10-07 | End: 2018-11-19

## 2018-10-09 RX ORDER — OXYCODONE HYDROCHLORIDE 5 MG/1
2.5 TABLET ORAL
COMMUNITY
Start: 2018-10-07 | End: 2018-11-19

## 2018-10-09 RX ORDER — HEPARIN SODIUM 5000 [USP'U]/ML
INJECTION, SOLUTION INTRAVENOUS; SUBCUTANEOUS
Refills: 0 | COMMUNITY
Start: 2018-10-07 | End: 2018-10-09

## 2018-10-09 RX ORDER — HEPARIN SODIUM 5000 [USP'U]/ML
5000 INJECTION, SOLUTION INTRAVENOUS; SUBCUTANEOUS
COMMUNITY
Start: 2018-10-07 | End: 2018-10-17

## 2018-10-09 RX ORDER — TRAMADOL HYDROCHLORIDE 50 MG/1
25 TABLET ORAL
COMMUNITY
Start: 2018-10-07 | End: 2018-11-19

## 2018-10-09 ASSESSMENT — PAIN SCALES - GENERAL: PAINLEVEL: MILD PAIN (3)

## 2018-10-09 NOTE — NURSING NOTE
"Chief Complaint   Patient presents with     [facility] F/U     [place] after hip surgery       Initial /68  Pulse 88  Temp 97.2  F (36.2  C) (Temporal)  Resp 20  Wt 213 lb 9.6 oz (96.9 kg)  BMI 33.96 kg/m2 Estimated body mass index is 33.96 kg/(m^2) as calculated from the following:    Height as of 12/6/17: 5' 6.5\" (1.689 m).    Weight as of this encounter: 213 lb 9.6 oz (96.9 kg).  Medication Reconciliation: complete    Cyndee Berry LPN  "

## 2018-10-09 NOTE — MR AVS SNAPSHOT
After Visit Summary   10/9/2018    Angel Garrett    MRN: [id number]           Patient Information     Date Of Birth          1945        Visit Information        Provider Department      10/9/2018 10:15 AM Donte Mckeon MD [facility]        Today's Diagnoses     Acute kidney injury (H)    -  1    Essential hypertension        H/O total hip arthroplasty, right        Chronic atrial fibrillation (H)        Anticoagulation monitoring, INR range 2-3        Long term current use of anticoagulant therapy        Postoperative anemia           Follow-ups after your visit        Who to contact     If you have questions or need follow up information about today's clinic visit or your schedule please contact [facility] directly at [phone].  Normal or non-critical lab and imaging results will be communicated to you by [employer], letter or phone within 4 business days after the clinic has received the results. If you do not hear from us within 7 days, please contact the clinic through [employer] or phone. If you have a critical or abnormal lab result, we will notify you by phone as soon as possible.  Submit refill requests through [employer] or call your pharmacy and they will forward the refill request to us. Please allow 3 business days for your refill to be completed.          Additional Information About Your Visit        MyChart Information     [employer] gives you secure access to your electronic health record. If you see a primary care provider, you can also send messages to your care team and make appointments. If you have questions, please call your primary care clinic.  If you do not have a primary care provider, please call [phone] and they will assist you.        Care EveryWhere ID     This is your Care EveryWhere ID. This could be used by other organizations to access your [facility] medical records  [id number]        Your Vitals Were     Pulse  Temperature Respirations BMI (Body Mass Index)          88 97.2  F (36.2  C) (Temporal) 20 33.96 kg/m2         Blood Pressure from Last 3 Encounters:   10/09/18 126/68   05/22/18 124/56   05/11/18 126/62    Weight from Last 3 Encounters:   10/09/18 213 lb 9.6 oz (96.9 kg)   05/22/18 211 lb (95.7 kg)   05/11/18 210 lb 6.4 oz (95.4 kg)              We Performed the Following     Basic metabolic panel     CBC with platelets differential          Today's Medication Changes          These changes are accurate as of 10/9/18 12:33 PM.  If you have any questions, ask your nurse or doctor.               These medicines have changed or have updated prescriptions.        Dose/Directions    acetaminophen 325 MG tablet   Commonly known as:  TYLENOL   This may have changed:  when to take this   Used for:  Closed fracture of multiple ribs of right side, initial encounter        Dose:  650 mg   Take 2 tablets (650 mg) by mouth every 4 hours as needed for mild pain   Quantity:  100 tablet   Refills:  1         Stop taking these medicines if you haven't already. Please contact your care team if you have questions.     lisinopril 20 MG tablet   Commonly known as:  PRINIVIL/ZESTRIL   Stopped by:  Donte Mckeon MD           warfarin 3 MG tablet   Commonly known as:  COUMADIN   Stopped by:  Donte Mckeon MD                   Information about OPIOIDS     PRESCRIPTION OPIOIDS: WHAT YOU NEED TO KNOW   We gave you an opioid (narcotic) pain medicine. It is important to manage your pain, but opioids are not always the best choice. You should first try all the other options your care team gave you. Take this medicine for as short a time (and as few doses) as possible.    Some activities can increase your pain, such as bandage changes or therapy sessions. It may help to take your pain medicine 30 to 60 minutes before these activities. Reduce your stress by getting enough sleep, working on hobbies you enjoy and practicing relaxation or  meditation. Talk to your care team about ways to manage your pain beyond prescription opioids.    These medicines have risks:    DO NOT drive when on new or higher doses of pain medicine. These medicines can affect your alertness and reaction times, and you could be arrested for driving under the influence (DUI). If you need to use opioids long-term, talk to your care team about driving.    DO NOT operate heavy machinery    DO NOT do any other dangerous activities while taking these medicines.    DO NOT drink any alcohol while taking these medicines.     If the opioid prescribed includes acetaminophen, DO NOT take with any other medicines that contain acetaminophen. Read all labels carefully. Look for the word  acetaminophen  or  Tylenol.  Ask your pharmacist if you have questions or are unsure.    You can get addicted to pain medicines, especially if you have a history of addiction (chemical, alcohol or substance dependence). Talk to your care team about ways to reduce this risk.    All opioids tend to cause constipation. Drink plenty of water and eat foods that have a lot of fiber, such as fruits, vegetables, prune juice, apple juice and high-fiber cereal. Take a laxative (Miralax, milk of magnesia, Colace, Senna) if you don t move your bowels at least every other day. Other side effects include upset stomach, sleepiness, dizziness, throwing up, tolerance (needing more of the medicine to have the same effect), physical dependence and slowed breathing.    Store your pills in a secure place, locked if possible. We will not replace any lost or stolen medicine. If you don t finish your medicine, please throw away (dispose) as directed by your pharmacist. The [place] Pollution Control Agency has more information about safe disposal: [email or web address]://[email or web address].[email or web address]/[email or web address]-unwanted-medications         Primary Care Provider Office Phone # Fax #    Donte CERNA [phone]  [facility] [address]        Equal Access to Services     CROW GAN : Hadii aad ku hadnadegeaugustina Shell, waarieto salas, qahansmagui brewermato almodovar, sammi mitchell. So [facility] [phone].    ATENCIÓN: Si habla español, tiene a dennis disposición servicios gratuitos de asistencia lingüística. Llame al [phone].    We comply with applicable federal civil rights laws and [place] laws. We do not discriminate on the basis of race, color, national origin, age, disability, sex, sexual orientation, or gender identity.            Thank you!     Thank you for choosing [facility] AND [facility]  for your care. Our goal is always to provide you with excellent care. Hearing back from our patients is one way we can continue to improve our services. Please take a few minutes to complete the written survey that you may receive in the mail after your visit with us. Thank you!             Your Updated Medication List - Protect others around you: Learn how to safely use, store and throw away your medicines at www.disposemymeds.org.          This list is accurate as of 10/9/18 12:33 PM.  Always use your most recent med list.                   Brand Name Dispense Instructions for use Diagnosis    acetaminophen 325 MG tablet    TYLENOL    100 tablet    Take 2 tablets (650 mg) by mouth every 4 hours as needed for mild pain    Closed fracture of multiple ribs of right side, initial encounter       ADCIRCA PO      Take 20 mg by mouth 2 times daily        digoxin 250 MCG tablet    LANOXIN     Take 250 mcg by mouth daily        heparin (porcine) 5000 UNIT/ML Soln injection      Inject 5,000 Units Subcutaneous        metoprolol succinate 50 MG 24 hr tablet    TOPROL-XL     Take 50 mg by mouth once daily.        oxyCODONE IR 5 MG tablet    ROXICODONE     Take 2.5 mg by mouth        senna-docusate 8.6-50 MG per tablet    SENOKOT-S;PERICOLACE     Take 1 tablet by mouth        torsemide 20 MG  tablet    DEMADEX    180 tablet    Take 1 tablet (20 mg) by mouth 2 times daily    Chronic atrial fibrillation (H)       traMADol 50 MG tablet    ULTRAM     Take 25 mg by mouth

## 2018-10-09 NOTE — PROGRESS NOTES
"Nursing Notes:   Cyndee Berry LPN  10/9/2018 10:36 AM  Signed  Chief Complaint   Patient presents with     Hospital F/U     [place] after hip surgery       Initial /68  Pulse 88  Temp 97.2  F (36.2  C) (Temporal)  Resp 20  Wt 213 lb 9.6 oz (96.9 kg)  BMI 33.96 kg/m2 Estimated body mass index is 33.96 kg/(m^2) as calculated from the following:    Height as of 12/6/17: 5' 6.5\" (1.689 m).    Weight as of this encounter: 213 lb 9.6 oz (96.9 kg).  Medication Reconciliation: complete    Cyndee Berry LPN      SUBJECTIVE:  Angel Garrett  is a 73 year old male who comes in today for follow-up.  He had a recent right total hip arthroplasty done by Dr. Green at [facility].  He was discharged 2 days ago.  They recommend that he have his electrolytes checked and he has been holding his lisinopril until rechecked.  He is continuing on torsemide at 20 mg daily.  While hospitalized, he had acute kidney injury with his baseline creatinine rising from 1.4-2.  He continues on heparin as well as warfarin.    He feels well and is feeling stable on his feet. He is walking with a walker and is doing well with steps.  He is doing some home therapy with leg stretches.  He is doing incentive spirometry. He starts more vigorous exercises on 10/31/18.  He goes back on December 20, 2018.      Past Medical, Family, and Social History reviewed and updated as noted below.   ROS is negative except as noted above       Allergies   Allergen Reactions     Pollen Extract Other (See Comments)     Nasal congestion   ,   Family History   Problem Relation Age of Onset     HEART DISEASE Mother      Heart Disease     HEART DISEASE Father      Heart Disease,CHF     Other - See Comments Father      PUD     Other - See Comments Brother      Liver or kidney cancer     Family History Negative Sister      Good Health     Diabetes Brother      Diabetes     Family History Negative Brother      Good Health     Family History Negative Brother  "     Good Health     Family History Negative Brother      Good Health   ,   Current Outpatient Prescriptions   Medication     acetaminophen (TYLENOL) 325 MG tablet     digoxin (LANOXIN) 250 MCG tablet     Heparin Sodium, Porcine, (HEPARIN, PORCINE,) 5000 UNIT/ML SOLN injection     metoprolol succinate (TOPROL-XL) 50 MG 24 hr tablet     oxyCODONE IR (ROXICODONE) 5 MG tablet     senna-docusate (SENOKOT-S;PERICOLACE) 8.6-50 MG per tablet     Tadalafil, PAH, (ADCIRCA PO)     torsemide (DEMADEX) 20 MG tablet     traMADol (ULTRAM) 50 MG tablet     No current facility-administered medications for this visit.    , History reviewed. No pertinent past medical history.,   Patient Active Problem List    Diagnosis Date Noted     Fall at home 05/10/2018     Priority: Medium     Long term current use of anticoagulant therapy 05/10/2018     Priority: Medium     Closed fracture of multiple ribs of right side, initial encounter 05/10/2018     Priority: Medium     Chronic atrial fibrillation (H) 02/11/2018     Priority: Medium     Anticoagulation monitoring, INR range 2-3 02/11/2018     Priority: Medium     Dysphonia 05/22/2017     Priority: Medium     Peritonsillar abscess 05/15/2017     Priority: Medium     Pulmonary hypertension (H) 05/15/2017     Priority: Medium     Obstructive sleep apnea 11/28/2016     Priority: Medium     Hx of congenital heart disease 12/05/2013     Priority: Medium     Hyperlipemia 12/05/2013     Priority: Medium     Hypertension 12/05/2013     Priority: Medium     Obesity 12/05/2013     Priority: Medium     Prediabetes 12/05/2013     Priority: Medium   ,   Past Surgical History:   Procedure Laterality Date     COLONOSCOPY      ~2009,Colonography     OTHER SURGICAL HISTORY      2008,,OPEN CHOLECYSTECTOMY     OTHER SURGICAL HISTORY      2002,12239,CARDIAC SURGERY,Pericardial patch, closure of ASD, repair of PFO and repair of anomalous pulmonary venous drainage.     OTHER SURGICAL HISTORY       2016,MWC645,CARDIAC CATHETERIZATION     Right total hip arthroplasty Right 10/2018    Demario Green MD,  [facility]     VASECTOMY      No Comments Provided    and   Social History   Substance Use Topics     Smoking status: Former Smoker     Quit date: 9/17/1984     Smokeless tobacco: Never Used     Alcohol use 3.5 oz/week      Comment: Alcoholic Drinks/day: 1-2 drinks a day     OBJECTIVE:  /68  Pulse 88  Temp 97.2  F (36.2  C) (Temporal)  Resp 20  Wt 213 lb 9.6 oz (96.9 kg)  BMI 33.96 kg/m2   EXAM:  Lungs are clear, no rales rhonchi or wheezes are heard.  Cardiac RRR without murmur.  His right hip incision is healing well.  Minimal erythema is noted.  No fluctuance or pain.    Results for orders placed or performed in visit on 10/09/18   Basic metabolic panel   Result Value Ref Range    Sodium 133 (L) 134 - 144 mmol/L    Potassium 4.8 3.5 - 5.1 mmol/L    Chloride 97 (L) 98 - 107 mmol/L    Carbon Dioxide 26 21 - 31 mmol/L    Anion Gap 10 3 - 14 mmol/L    Glucose 113 (H) 70 - 105 mg/dL    Urea Nitrogen 60 (H) 7 - 25 mg/dL    Creatinine 1.64 (H) 0.70 - 1.30 mg/dL    GFR Estimate 41 (L) >60 mL/min/1.7m2    GFR Estimate If Black 50 (L) >60 mL/min/1.7m2    Calcium 8.7 8.6 - 10.3 mg/dL   CBC with platelets differential   Result Value Ref Range    WBC 5.8 4.0 - 11.0 10e9/L    RBC Count 2.86 (L) 4.4 - 5.9 10e12/L    Hemoglobin 10.4 (L) 13.3 - 17.7 g/dL    Hematocrit 30.6 (L) 40.0 - 53.0 %     (H) 78 - 100 fl    MCH 36.4 (H) 26.5 - 33.0 pg    MCHC 34.0 31.5 - 36.5 g/dL    RDW 12.5 10.0 - 15.0 %    Platelet Count 195 150 - 450 10e9/L    Diff Method Automated Method     % Neutrophils 71.3 %    % Lymphocytes 16.1 %    % Monocytes 9.0 %    % Eosinophils 2.4 %    % Basophils 0.5 %    % Immature Granulocytes 0.7 %    Absolute Neutrophil 4.1 1.6 - 8.3 10e9/L    Absolute Lymphocytes 0.9 0.8 - 5.3 10e9/L    Absolute Monocytes 0.5 0.0 - 1.3 10e9/L    Absolute Eosinophils 0.1 0.0 - 0.7 10e9/L    Absolute Basophils  0.0 0.0 - 0.2 10e9/L    Abs Immature Granulocytes 0.0 0 - 0.4 10e9/L      ASSESSMENT/Plan :    Angel was seen today for hospital f/u.    Diagnoses and all orders for this visit:    Acute kidney injury (H)  -     Basic metabolic panel; Future  -     Basic metabolic panel    Essential hypertension  -     Basic metabolic panel; Future  -     Basic metabolic panel    H/O total hip arthroplasty, right    Chronic atrial fibrillation (H)    Anticoagulation monitoring, INR range 2-3    Long term current use of anticoagulant therapy    Postoperative anemia  -     CBC with platelets differential; Future  -     CBC with platelets differential      Labs have improved blood pressure still good so okay to stay off lisinopril for now.  He will monitor blood pressure at home and might consider resuming if blood pressure drifts upward.  He is fairly close to his baseline GFR but will keep him off the ACE inhibitor for now.  Continue with heparin as he has been instructed and then resume Coumadin and INR monitoring as scheduled.    A total of 15 minutes was spent with the patient, greater than 50% of the time was spent in counseling/discussion of the aforementioned concerns.     Donte Mckeon MD

## 2018-10-18 ENCOUNTER — ANTICOAGULATION THERAPY VISIT (OUTPATIENT)
Dept: ANTICOAGULATION | Facility: OTHER | Age: 73
End: 2018-10-18
Attending: FAMILY MEDICINE
Payer: MEDICARE

## 2018-10-18 DIAGNOSIS — I48.20 CHRONIC ATRIAL FIBRILLATION (H): ICD-10-CM

## 2018-10-18 LAB — INR POINT OF CARE: 1.1 (ref 0.86–1.14)

## 2018-10-18 PROCEDURE — 36416 COLLJ CAPILLARY BLOOD SPEC: CPT | Mod: ZL

## 2018-10-18 NOTE — PROGRESS NOTES
ANTICOAGULATION FOLLOW-UP CLINIC VISIT    Patient Name:  Angel Garrett  Date:  10/18/2018  Contact Type:  Face to Face    SUBJECTIVE:     Patient Findings     Positives Change in medications, Other complaints    Comments Is on heparin after hip replacement on 10-3-18.  Was instructed by Arnold and Dr. Mckeon to resume normal coumadin dosing until INR range is reached, then stop Heparin. Took a double dose of coumadin yesterday as instructed by Arnold and now resuming normal dose. Has 2+ edema to lower extremities, more on right side. No SOB.            OBJECTIVE    INR Protime   Date Value Ref Range Status   10/18/2018 1.1 0.86 - 1.14 Final       ASSESSMENT / PLAN  INR assessment SUB    Recheck INR In: 4 DAYS    INR Location Clinic      Anticoagulation Summary as of 10/18/2018     INR goal 2.0-3.0   Today's INR 1.1!   Warfarin maintenance plan 9 mg (3 mg x 3) on Sat; 6 mg (3 mg x 2) all other days   Full warfarin instructions 9 mg on Sat; 6 mg all other days   Weekly warfarin total 45 mg   No change documented Krystle Sung RN   Next INR check 10/22/2018   [place] end date Indefinite    Indications   Chronic atrial fibrillation (H) [I48.2]         Anticoagulation Episode Summary     INR check location     Preferred lab     Send INR reminders to  INR    Comments       Anticoagulation Care Providers     Provider Role Specialty Phone number    Donte Mckeon MD [facility] [phone]            See the Encounter Report to view Anticoagulation Flowsheet and Dosing Calendar (Go to Encounters tab in chart review, and find the Anticoagulation Therapy Visit)        Krystle Sung RN

## 2018-10-18 NOTE — MR AVS SNAPSHOT
Angel Garrett   10/18/2018 11:15 AM   Anticoagulation Therapy Visit    Description:  73 year old male   Provider:  [facility]   Department:  [facility]           INR as of 10/18/2018     Today's INR 1.1!      Anticoagulation Summary as of 10/18/2018     INR goal 2.0-3.0   Today's INR 1.1!   Full warfarin instructions 9 mg on Sat; 6 mg all other days   Next INR check 10/22/2018    Indications   Chronic atrial fibrillation (H) [I48.2]         Description     Patient had hip surgery on 10-3-18, and is using heparin currently. Was instructed by Dr. Mckeon to resume normal coumadin until INR range is reached. Continue with same dose and return on Monday. Krystle Sung RN on 10/18/2018 at 11:16 AM        October 2018 Details    Sun Mon Tue Wed Thu Fri Sat      1               2               3               4               5               6                 7               8               9               10               11               12               13                 14               15               16               17               18      6 mg   See details      19      6 mg         20      9 mg           21      6 mg         22            23               24               25               26               27                 28               29               30               31                   Date Details   10/18 This INR check       Date of next INR:  10/22/2018         How to take your warfarin dose     To take:  6 mg Take 2 of the 3 mg tablets.    To take:  9 mg Take 3 of the 3 mg tablets.

## 2018-10-22 ENCOUNTER — ANTICOAGULATION THERAPY VISIT (OUTPATIENT)
Dept: ANTICOAGULATION | Facility: OTHER | Age: 73
End: 2018-10-22
Attending: FAMILY MEDICINE
Payer: MEDICARE

## 2018-10-22 DIAGNOSIS — I48.20 CHRONIC ATRIAL FIBRILLATION (H): ICD-10-CM

## 2018-10-22 LAB — INR POINT OF CARE: 1.5 (ref 0.86–1.14)

## 2018-10-22 PROCEDURE — 85610 PROTHROMBIN TIME: CPT | Mod: QW

## 2018-10-22 NOTE — MR AVS SNAPSHOT
Angel Garrett   10/22/2018 11:00 AM   Anticoagulation Therapy Visit    Description:  73 year old male   Provider:  [facility]   Department:  [facility]           INR as of 10/22/2018     Today's INR 1.5!      Anticoagulation Summary as of 10/22/2018     INR goal 2.0-3.0   Today's INR 1.5!   Full warfarin instructions 10/22: 9 mg; 10/23: 9 mg; Otherwise 9 mg on Sat; 6 mg all other days   Next INR check 10/24/2018    Indications   Chronic atrial fibrillation (H) [I48.2]         Description     Take 9 mg x 2 days and recheck on 10/24/18. Saba Honeycutt RN    10/22/2018  10:52 AM        Your next Anticoagulation Clinic appointment(s)     Oct 22, 2018 11:00 AM CDT   Anticoagulation Visit with [facility]   [facility] ([facility])    [address]  [facility] [address]   [phone]              October 2018 Details    Sun Mon Tue Wed Thu Fri Sat      1               2               3               4               5               6                 7               8               9               10               11               12               13                 14               15               16               17               18               19               20                 21               22      9 mg   See details      23      9 mg         24            25               26               27                 28               29               30               31                   Date Details   10/22 This INR check       Date of next INR:  10/24/2018         How to take your warfarin dose     To take:  6 mg Take 2 of the 3 mg tablets.    To take:  9 mg Take 3 of the 3 mg tablets.

## 2018-10-22 NOTE — PROGRESS NOTES
ANTICOAGULATION FOLLOW-UP CLINIC VISIT    Patient Name:  Angel Garrett  Date:  10/22/2018  Contact Type:  Face to Face    SUBJECTIVE:     Patient Findings     Positives Other complaints (continues with heparin and increased dose to reached INR goal after hip surgery)           OBJECTIVE    INR Protime   Date Value Ref Range Status   10/22/2018 1.5 (A) 0.86 - 1.14 Final       ASSESSMENT / PLAN  INR assessment SUB    Recheck INR In: 2 DAYS    INR Location Clinic      Anticoagulation Summary as of 10/22/2018     INR goal 2.0-3.0   Today's INR 1.5!   Warfarin maintenance plan 9 mg (3 mg x 3) on Sat; 6 mg (3 mg x 2) all other days   Full warfarin instructions 10/22: 9 mg; 10/23: 9 mg; Otherwise 9 mg on Sat; 6 mg all other days   Weekly warfarin total 45 mg   Next INR check 10/24/2018   Target end date Indefinite    Indications   Chronic atrial fibrillation (H) [I48.2]         Anticoagulation Episode Summary     INR check location     Preferred lab     Send INR reminders to  INR    Comments       Anticoagulation Care Providers     Provider Role Specialty Phone number    Donte Mckeon MD [facility] [phone]            See the Encounter Report to view Anticoagulation Flowsheet and Dosing Calendar (Go to Encounters tab in chart review, and find the Anticoagulation Therapy Visit)        Saba Honeycutt RN

## 2018-10-24 ENCOUNTER — ANTICOAGULATION THERAPY VISIT (OUTPATIENT)
Dept: ANTICOAGULATION | Facility: OTHER | Age: 73
End: 2018-10-24
Attending: FAMILY MEDICINE
Payer: MEDICARE

## 2018-10-24 DIAGNOSIS — Z79.01 LONG TERM CURRENT USE OF ANTICOAGULANT THERAPY: ICD-10-CM

## 2018-10-24 DIAGNOSIS — I48.20 CHRONIC ATRIAL FIBRILLATION (H): Primary | ICD-10-CM

## 2018-10-24 LAB — INR POINT OF CARE: 2.2 (ref 2–3)

## 2018-10-24 PROCEDURE — 85610 PROTHROMBIN TIME: CPT | Mod: QW

## 2018-10-24 RX ORDER — WARFARIN SODIUM 3 MG/1
TABLET ORAL
Qty: 30 TABLET | COMMUNITY
Start: 2018-10-24 | End: 2018-12-05

## 2018-10-24 NOTE — MR AVS SNAPSHOT
Angel Garrett   10/24/2018 12:45 PM   Anticoagulation Therapy Visit    Description:  73 year old male   Provider:  [facility]   Department:  [facility]           INR as of 10/24/2018     Today's INR 2.2      Anticoagulation Summary as of 10/24/2018     INR goal 2.0-3.0   Today's INR 2.2   Full warfarin instructions 9 mg on Sat; 6 mg all other days   Next INR check 10/30/2018    Indications   Chronic atrial fibrillation (H) [I48.2]         Description     Resume regular dose of 9 mg x one day/week, and 6 mg x six days/week. Recheck INR 10/30/18.  Izabella Verma ....................  10/24/2018   12:16 PM  Stop Heparin      Your next Anticoagulation Clinic appointment(s)     Oct 24, 2018 12:45 PM CDT   Anticoagulation Visit with [facility] 1   [facility] ([facility])    [address]  [place] [address]   [phone]              October 2018 Details    Sun Mon Tue Wed Thu Fri Sat      1               2               3               4               5               6                 7               8               9               10               11               12               13                 14               15               16               17               18               19               20                 21               22               23               24      6 mg   See details      25      6 mg         26      6 mg         27      9 mg           28      6 mg         29      6 mg         30            31                   Date Details   10/24 This INR check       Date of next INR:  10/30/2018         How to take your warfarin dose     To take:  6 mg Take 2 of the 3 mg tablets.    To take:  9 mg Take 3 of the 3 mg tablets.

## 2018-10-24 NOTE — PROGRESS NOTES
ANTICOAGULATION FOLLOW-UP CLINIC VISIT    Patient Name:  Angel Garrett  Date:  10/24/2018  Contact Type:  Face to Face    SUBJECTIVE:     Patient Findings     Positives Change in medications (stopped pain medications, occasional Tylenol)           OBJECTIVE    INR Protime   Date Value Ref Range Status   10/24/2018 2.2 2.0 - 3.0 Final       ASSESSMENT / PLAN  INR assessment THER    Recheck INR In: 1 WEEK    INR Location Clinic      Anticoagulation Summary as of 10/24/2018     INR goal 2.0-3.0   Today's INR 2.2   Warfarin maintenance plan 9 mg (3 mg x 3) on Sat; 6 mg (3 mg x 2) all other days   Full warfarin instructions 9 mg on Sat; 6 mg all other days   Weekly warfarin total 45 mg   No change documented Izabella Verma RN   Next INR check 10/30/2018   Target end date Indefinite    Indications   Chronic atrial fibrillation (H) [I48.2]         Anticoagulation Episode Summary     INR check location     Preferred lab     Send INR reminders to  INR    Comments       Anticoagulation Care Providers     Provider Role Specialty Phone number    Donte Mckeon MD [facility] [phone]            See the Encounter Report to view Anticoagulation Flowsheet and Dosing Calendar (Go to Encounters tab in chart review, and find the Anticoagulation Therapy Visit)        Izabella Verma RN

## 2018-10-25 ENCOUNTER — TELEPHONE (OUTPATIENT)
Dept: FAMILY MEDICINE | Facility: OTHER | Age: 73
End: 2018-10-25

## 2018-10-25 DIAGNOSIS — N17.9 ACUTE KIDNEY INJURY (H): Primary | ICD-10-CM

## 2018-10-25 NOTE — TELEPHONE ENCOUNTER
Donte Mckeon MD had ordered kidney function (creatinine) he recently had this done.   He states [place] would like him to have another Creatinine checked since he had his hip replacement.    He would like to have this done on 10/30 when he is checking his INR.  Please sign order or call patient if there is a problem.

## 2018-10-29 NOTE — TELEPHONE ENCOUNTER
Left message on voicemail that states his name that lab order was placed and can have lab appt.  Pura Vera LPN .............10/29/2018     9:29 AM

## 2018-10-30 ENCOUNTER — ANTICOAGULATION THERAPY VISIT (OUTPATIENT)
Dept: ANTICOAGULATION | Facility: OTHER | Age: 73
End: 2018-10-30
Attending: FAMILY MEDICINE
Payer: MEDICARE

## 2018-10-30 DIAGNOSIS — I48.20 CHRONIC ATRIAL FIBRILLATION (H): ICD-10-CM

## 2018-10-30 DIAGNOSIS — N17.9 ACUTE KIDNEY INJURY (H): ICD-10-CM

## 2018-10-30 LAB
CREAT SERPL-MCNC: 1.26 MG/DL (ref 0.7–1.3)
GFR SERPL CREATININE-BSD FRML MDRD: 56 ML/MIN/1.7M2
INR POINT OF CARE: 2.1 (ref 0.86–1.14)

## 2018-10-30 PROCEDURE — 85610 PROTHROMBIN TIME: CPT | Mod: QW

## 2018-10-30 PROCEDURE — 82565 ASSAY OF CREATININE: CPT | Performed by: FAMILY MEDICINE

## 2018-10-30 NOTE — PROGRESS NOTES
ANTICOAGULATION FOLLOW-UP CLINIC VISIT    Patient Name:  Angel Garrett  Date:  10/30/2018  Contact Type:  Face to Face    SUBJECTIVE:     Patient Findings     Positives No Problem Findings           OBJECTIVE    INR Protime   Date Value Ref Range Status   10/30/2018 2.1 (A) 0.86 - 1.14 Final       ASSESSMENT / PLAN  INR assessment THER    Recheck INR In: 2 WEEKS    INR Location Clinic      Anticoagulation Summary as of 10/30/2018     INR goal 2.0-3.0   Today's INR 2.1   Warfarin maintenance plan 9 mg (3 mg x 3) on Sat; 6 mg (3 mg x 2) all other days   Full warfarin instructions 9 mg on Sat; 6 mg all other days   Weekly warfarin total 45 mg   No change documented Saba Honeycutt, PAIGE   Next INR check 11/13/2018   Priority INR   Target end date Indefinite    Indications   Chronic atrial fibrillation (H) [I48.2]         Anticoagulation Episode Summary     INR check location     Preferred lab     Send INR reminders to  INR    Comments       Anticoagulation Care Providers     Provider Role Specialty Phone number    Donte Mckeon MD [facility] [phone]            See the Encounter Report to view Anticoagulation Flowsheet and Dosing Calendar (Go to Encounters tab in chart review, and find the Anticoagulation Therapy Visit)        Saba Honeycutt, RN

## 2018-10-30 NOTE — MR AVS SNAPSHOT
Angel Garrett   10/30/2018 11:15 AM   Anticoagulation Therapy Visit    Description:  73 year old male   Provider:  [facility]   Department:  [facility]           INR as of 10/30/2018     Today's INR 2.1      Anticoagulation Summary as of 10/30/2018     INR goal 2.0-3.0   Today's INR 2.1   Full warfarin instructions 9 mg on Sat; 6 mg all other days   Next INR check 11/13/2018    Indications   Chronic atrial fibrillation (H) [I48.2]         Description     Continue same dose and recheck in 2-3 weeks. ...............Saba Honeycutt RN    10/30/2018    11:22 AM        October 2018 Details    Sun Mon Tue Wed Thu Fri Sat      1               2               3               4               5               6                 7               8               9               10               11               12               13                 14               15               16               17               18               19               20                 21               22               23               24               25               26               27                 28               29               30      6 mg   See details      31      6 mg             Date Details   10/30 This INR check               How to take your warfarin dose     To take:  6 mg Take 2 of the 3 mg tablets.           November 2018 Details    Sun Mon Tue Wed Thu Fri Sat         1      6 mg         2      6 mg         3      9 mg           4      6 mg         5      6 mg         6      6 mg         7      6 mg         8      6 mg         9      6 mg         10      9 mg           11      6 mg         12      6 mg         13            14               15               16               17                 18               19               20               21               22               23               24                 25               26               27               28               29               30                 Date  Details   No additional details    Date of next INR:  11/13/2018         How to take your warfarin dose     To take:  6 mg Take 2 of the 3 mg tablets.    To take:  9 mg Take 3 of the 3 mg tablets.

## 2018-11-13 ENCOUNTER — ANTICOAGULATION THERAPY VISIT (OUTPATIENT)
Dept: ANTICOAGULATION | Facility: OTHER | Age: 73
End: 2018-11-13
Attending: FAMILY MEDICINE
Payer: MEDICARE

## 2018-11-13 DIAGNOSIS — I48.20 CHRONIC ATRIAL FIBRILLATION (H): ICD-10-CM

## 2018-11-13 LAB — INR POINT OF CARE: 2.1 (ref 0.86–1.14)

## 2018-11-13 PROCEDURE — 85610 PROTHROMBIN TIME: CPT | Mod: QW,ZL

## 2018-11-13 NOTE — MR AVS SNAPSHOT
Angel Garrett   11/13/2018 11:15 AM   Anticoagulation Therapy Visit    Description:  73 year old male   Provider:  [facility]   Department:  [facility]           INR as of 11/13/2018     Today's INR 2.1      Anticoagulation Summary as of 11/13/2018     INR goal 2.0-3.0   Today's INR 2.1   Full warfarin instructions 9 mg on Sat; 6 mg all other days   Next INR check 12/11/2018    Indications   Chronic atrial fibrillation (H) [I48.2]         Description     Continue same Warfarin dose and recheck in 1 month.  Saba Honeycutt RN   11/13/2018    11:00 AM        Your next Anticoagulation Clinic appointment(s)     Nov 13, 2018 11:15 AM CST   Anticoagulation Visit with [facility]   [facility] ([facility])    [address]  [place] [address]   [phone]              November 2018 Details    Sun Mon Tue Wed Thu Fri Sat         1               2               3                 4               5               6               7               8               9               10                 11               12               13      6 mg   See details      14      6 mg         15      6 mg         16      6 mg         17      9 mg           18      6 mg         19      6 mg         20      6 mg         21      6 mg         22      6 mg         23      6 mg         24      9 mg           25      6 mg         26      6 mg         27      6 mg         28      6 mg         29      6 mg         30      6 mg           Date Details   11/13 This INR check               How to take your warfarin dose     To take:  6 mg Take 2 of the 3 mg tablets.    To take:  9 mg Take 3 of the 3 mg tablets.           December 2018 Details    Sun Mon Tue Wed Thu Fri Sat           1      9 mg           2      6 mg         3      6 mg         4      6 mg         5      6 mg         6      6 mg         7      6 mg         8      9 mg           9      6 mg         10      6 mg          11            12               13               14               15                 16               17               18               19               20               21               22                 23               24               25               26               27               28               29                 30               31                     Date Details   No additional details    Date of next INR:  12/11/2018         How to take your warfarin dose     To take:  6 mg Take 2 of the 3 mg tablets.    To take:  9 mg Take 3 of the 3 mg tablets.

## 2018-11-13 NOTE — PROGRESS NOTES
ANTICOAGULATION FOLLOW-UP CLINIC VISIT    Patient Name:  Angel Garrett  Date:  11/13/2018  Contact Type:  Face to Face    SUBJECTIVE:     Patient Findings     Positives No Problem Findings           OBJECTIVE    INR Protime   Date Value Ref Range Status   11/13/2018 2.1 (A) 0.86 - 1.14 Final       ASSESSMENT / PLAN  INR assessment THER    Recheck INR In: 4 WEEKS    INR Location Clinic      Anticoagulation Summary as of 11/13/2018     INR goal 2.0-3.0   Today's INR 2.1   Warfarin maintenance plan 9 mg (3 mg x 3) on Sat; 6 mg (3 mg x 2) all other days   Full warfarin instructions 9 mg on Sat; 6 mg all other days   Weekly warfarin total 45 mg   No change documented Saba Honeycutt, PAIGE   Next INR check 12/11/2018   Priority INR   Target end date Indefinite    Indications   Chronic atrial fibrillation (H) [I48.2]         Anticoagulation Episode Summary     INR check location     Preferred lab     Send INR reminders to  INR    Comments       Anticoagulation Care Providers     Provider Role Specialty Phone number    Donte Mckeon MD [facility] [phone]            See the Encounter Report to view Anticoagulation Flowsheet and Dosing Calendar (Go to Encounters tab in chart review, and find the Anticoagulation Therapy Visit)        Saba Honeycutt, RN

## 2018-11-15 ENCOUNTER — TRANSFERRED RECORDS (OUTPATIENT)
Dept: HEALTH INFORMATION MANAGEMENT | Facility: OTHER | Age: 73
End: 2018-11-15

## 2018-11-15 ENCOUNTER — TRANSFERRED RECORDS (OUTPATIENT)
Dept: MULTI SPECIALTY CLINIC | Facility: CLINIC | Age: 73
End: 2018-11-15

## 2018-11-15 LAB
HEMOCCULT STL QL IA: NEGATIVE
HEMOCCULT STL QL IA: NORMAL

## 2018-11-19 ENCOUNTER — OFFICE VISIT (OUTPATIENT)
Dept: PULMONOLOGY | Facility: OTHER | Age: 73
End: 2018-11-19
Attending: INTERNAL MEDICINE
Payer: MEDICARE

## 2018-11-19 VITALS
WEIGHT: 215 LBS | RESPIRATION RATE: 18 BRPM | DIASTOLIC BLOOD PRESSURE: 64 MMHG | BODY MASS INDEX: 34.55 KG/M2 | HEART RATE: 68 BPM | HEIGHT: 66 IN | SYSTOLIC BLOOD PRESSURE: 132 MMHG

## 2018-11-19 DIAGNOSIS — G47.33 OSA ON CPAP: Primary | ICD-10-CM

## 2018-11-19 PROCEDURE — G0463 HOSPITAL OUTPT CLINIC VISIT: HCPCS

## 2018-11-19 ASSESSMENT — ANXIETY QUESTIONNAIRES
3. WORRYING TOO MUCH ABOUT DIFFERENT THINGS: NOT AT ALL
5. BEING SO RESTLESS THAT IT IS HARD TO SIT STILL: NOT AT ALL
IF YOU CHECKED OFF ANY PROBLEMS ON THIS QUESTIONNAIRE, HOW DIFFICULT HAVE THESE PROBLEMS MADE IT FOR YOU TO DO YOUR WORK, TAKE CARE OF THINGS AT HOME, OR GET ALONG WITH OTHER PEOPLE: NOT DIFFICULT AT ALL
7. FEELING AFRAID AS IF SOMETHING AWFUL MIGHT HAPPEN: NOT AT ALL
6. BECOMING EASILY ANNOYED OR IRRITABLE: NOT AT ALL
1. FEELING NERVOUS, ANXIOUS, OR ON EDGE: NOT AT ALL
GAD7 TOTAL SCORE: 0
2. NOT BEING ABLE TO STOP OR CONTROL WORRYING: NOT AT ALL

## 2018-11-19 ASSESSMENT — PATIENT HEALTH QUESTIONNAIRE - PHQ9: 5. POOR APPETITE OR OVEREATING: NOT AT ALL

## 2018-11-19 ASSESSMENT — PAIN SCALES - GENERAL: PAINLEVEL: MILD PAIN (3)

## 2018-11-19 NOTE — PROGRESS NOTES
"Sleep Medicine Progress Note  Angel Garrett  November 19, 2018  [id number]    Chief Complaint: sleep apnea    History of Present Illness: Angel Garrett is a 73 year old male presenting for above complaint.  He is here for sleep reevaluation and for an order for new supplies.  He wears his CPAP nightly.  He needs a new water chamber for his humidifier and a new heated tube.  He has been getting supplies from [employer] from [place].  He will not sleep without his CPAP.  He says he only would sleep without it if he does not have electricity.  He recently had hip surgery and is been sleeping supine more.  Download for CPAP compliance shows that he wore it 362 out of 365 nights.  He averages 7 hours 26 minutes per night.  His apnea hypopnea index averages to 3/h however since October he has had more events.  Detailed download of his event shows that they are primarily obstructive and he is not having significant leak.  He is only on a pressure of 9 cm water pressure.  With his hip surgery he has been sleeping supine more.  Sleep study was performed June 27, 2016 and showed an apnea hypopnea index of 49/h and a respiratory disturbance index of 64.3/h.  He was some what complex on his sleep study however he has done well with CPAP without central events.  [place]      Medications:  Current Outpatient Prescriptions   Medication     acetaminophen (TYLENOL) 325 MG tablet     digoxin (LANOXIN) 250 MCG tablet     metoprolol succinate (TOPROL-XL) 50 MG 24 hr tablet     Tadalafil, PAH, (ADCIRCA PO)     torsemide (DEMADEX) 20 MG tablet     warfarin (COUMADIN) 3 MG tablet     No current facility-administered medications for this visit.        Physical Exam:  /64 (BP Location: Right arm, Patient Position: Sitting, Cuff Size: Adult Large)  Pulse 68  Resp 18  Ht 5' 6\" (1.676 m)  Wt 215 lb (97.5 kg)  BMI 34.7 kg/m2  Neck circumference 17-1/2 " inches, pharynx is without erythema, dentition is intact, posterior crowding is noted, malampatti class III.  Lungs are clear no wheeze, rhonchi, crackles, or focal dullness.  Cardiovascular exam S1-S2, regular rhythm and rate, no murmur, rub, or gallop.    Assessment and Plan:  73 year old male presenting for obstructive sleep apnea.  He is doing well with CPAP compliance.  He needs a slightly higher pressure as he is having some breakthrough obstructive events.  His download was reviewed in detail with him.  We will set his CPAP to auto titrate from 10-11 cm water pressure.  An order is written for this adjustment through [facility].  An order is also written for new heated tubing and water chamber.  Also I will renew any mask and supplies as necessary.  We will check a download to see how he does with the pressure change in one to two weeks.  Sleep follow-up will be on a as needed or yearly basis..    Cc: [facility]  Cc: Dr. Donte Mckeon

## 2018-11-19 NOTE — NURSING NOTE
Patient presents to clinic for re certification for his Cpap machine and equipment.    Medication Reconciliation: complete    Krystle Jackson LPN

## 2018-11-19 NOTE — MR AVS SNAPSHOT
After Visit Summary   11/19/2018    Angel Garrett    MRN: [id number]           Patient Information     Date Of Birth          1945        Visit Information        Provider Department      11/19/2018 11:00 AM Robb Salcedo [facility]        Today's Diagnoses     JANESSA on CPAP    -  1       Follow-ups after your visit        Your next 10 appointments already scheduled     Dec 05, 2018  9:00 AM CST   PHYSICAL with Donte CERNA MD   [facility] ([facility])    [address]  [facility] [id number] [phone]            Dec 11, 2018 11:15 AM CST   Anticoagulation Visit with GH ANTI COAG 1   [facility]    [address]  [facility] [address] [phone]              Who to contact     If you have questions or need follow up information about today's clinic visit or your schedule please contact [facility] directly at [phone].  Normal or non-critical lab and imaging results will be communicated to you by Updaterhart, letter or phone within 4 business days after the clinic has received the results. If you do not hear from us within 7 days, please contact the clinic through Shhmoozet or phone. If you have a critical or abnormal lab result, we will notify you by phone as soon as possible.  Submit refill requests through gogamingo or call your pharmacy and they will forward the refill request to us. Please allow 3 business days for your refill to be completed.          Additional Information About Your Visit        Updaterhart Information     gogamingo gives you secure access to your electronic health record. If you see a primary care provider, you can also send messages to your care team and make appointments. If you have questions, please call your primary care clinic.  If you do not have a primary care provider, please call [phone] and  "they will assist you.        Care EveryWhere ID     This is your Care EveryWhere ID. This could be used by other organizations to access your [place] medical records  [id number]        Your Vitals Were     Pulse Respirations Height BMI (Body Mass Index)          68 18 5' 6\" (1.676 m) 34.7 kg/m2         Blood Pressure from Last 3 Encounters:   11/19/18 132/64   10/09/18 126/68   05/22/18 124/56    Weight from Last 3 Encounters:   11/19/18 215 lb (97.5 kg)   10/09/18 213 lb 9.6 oz (96.9 kg)   05/22/18 211 lb (95.7 kg)              Today, you had the following     No orders found for display         Today's Medication Changes          These changes are accurate as of 11/19/18 11:30 AM.  If you have any questions, ask your nurse or doctor.               These medicines have changed or have updated prescriptions.        Dose/Directions    acetaminophen 325 MG tablet   Commonly known as:  TYLENOL   This may have changed:  when to take this   Used for:  Closed fracture of multiple ribs of right side, initial encounter        Dose:  650 mg   Take 2 tablets (650 mg) by mouth every 4 hours as needed for mild pain   Quantity:  100 tablet   Refills:  1                Primary Care Provider Office Phone # Fax #    Donte CERNA [phone] [address] [facility] [address]        Equal Access to Services     [facility] AH: Hadii apolonia elenao Sojoe, waaxda luqadaha, qaybta kaalmada scooby, sammi mitchell. So [facility] [phone].    ATENCIÓN: Si habla español, tiene a dennis disposición servicios gratuitos de asistencia lingüística. Juan al [phone].    We comply with applicable federal civil rights laws and [place] laws. We do not discriminate on the basis of race, color, national origin, age, disability, sex, sexual orientation, or gender identity.            Thank you!     Thank you for choosing [facility] AND [facility]  for your care. Our goal " is always to provide you with excellent care. Hearing back from our patients is one way we can continue to improve our services. Please take a few minutes to complete the written survey that you may receive in the mail after your visit with us. Thank you!             Your Updated Medication List - Protect others around you: Learn how to safely use, store and throw away your medicines at www.disposemymeds.org.          This list is accurate as of 11/19/18 11:30 AM.  Always use your most recent med list.                   Brand Name Dispense Instructions for use Diagnosis    acetaminophen 325 MG tablet    TYLENOL    100 tablet    Take 2 tablets (650 mg) by mouth every 4 hours as needed for mild pain    Closed fracture of multiple ribs of right side, initial encounter       ADCIRCA PO      Take 20 mg by mouth 2 times daily        digoxin 250 MCG tablet    LANOXIN     Take 250 mcg by mouth daily        metoprolol succinate 50 MG 24 hr tablet    TOPROL-XL     Take 50 mg by mouth once daily.        torsemide 20 MG tablet    DEMADEX    180 tablet    Take 1 tablet (20 mg) by mouth 2 times daily    Chronic atrial fibrillation (H)       warfarin 3 MG tablet    COUMADIN    30 tablet    Take 9 mg x one day/week (Saturdays), and 6 mg x six days/week.  Or as directed by the protime clinic.    Chronic atrial fibrillation (H), Long term current use of anticoagulant therapy

## 2018-11-20 ASSESSMENT — ANXIETY QUESTIONNAIRES: GAD7 TOTAL SCORE: 0

## 2018-11-29 ENCOUNTER — TELEPHONE (OUTPATIENT)
Dept: FAMILY MEDICINE | Facility: OTHER | Age: 73
End: 2018-11-29

## 2018-11-29 DIAGNOSIS — I48.20 CHRONIC ATRIAL FIBRILLATION (H): Primary | ICD-10-CM

## 2018-11-29 RX ORDER — DIGOXIN 250 MCG
250 TABLET ORAL DAILY
Qty: 30 TABLET | Status: SHIPPED | OUTPATIENT
Start: 2018-11-29 | End: 2019-01-02

## 2018-11-29 NOTE — TELEPHONE ENCOUNTER
PLEASE RENEW THE ORDER FOR     digoxin (LANOXIN) 250 MCG tablet    A REQUEST CAME FROM [facility]          THANKS,      GARRY

## 2018-12-05 ENCOUNTER — OFFICE VISIT (OUTPATIENT)
Dept: FAMILY MEDICINE | Facility: OTHER | Age: 73
End: 2018-12-05
Attending: FAMILY MEDICINE
Payer: MEDICARE

## 2018-12-05 VITALS
TEMPERATURE: 97.9 F | BODY MASS INDEX: 34.54 KG/M2 | RESPIRATION RATE: 16 BRPM | WEIGHT: 214 LBS | SYSTOLIC BLOOD PRESSURE: 110 MMHG | HEART RATE: 72 BPM | DIASTOLIC BLOOD PRESSURE: 70 MMHG

## 2018-12-05 DIAGNOSIS — I10 ESSENTIAL HYPERTENSION: Primary | ICD-10-CM

## 2018-12-05 DIAGNOSIS — Z79.01 LONG TERM CURRENT USE OF ANTICOAGULANT THERAPY: ICD-10-CM

## 2018-12-05 DIAGNOSIS — G47.33 OBSTRUCTIVE SLEEP APNEA: ICD-10-CM

## 2018-12-05 DIAGNOSIS — I48.20 CHRONIC ATRIAL FIBRILLATION (H): ICD-10-CM

## 2018-12-05 DIAGNOSIS — Z79.01 ANTICOAGULATION MONITORING, INR RANGE 2-3: ICD-10-CM

## 2018-12-05 DIAGNOSIS — R73.03 PREDIABETES: ICD-10-CM

## 2018-12-05 DIAGNOSIS — Z13.6 SCREENING FOR AAA (ABDOMINAL AORTIC ANEURYSM): ICD-10-CM

## 2018-12-05 DIAGNOSIS — E78.5 HYPERLIPIDEMIA, UNSPECIFIED HYPERLIPIDEMIA TYPE: ICD-10-CM

## 2018-12-05 DIAGNOSIS — N42.9 PROSTATE DISORDER: ICD-10-CM

## 2018-12-05 DIAGNOSIS — I27.20 PULMONARY HYPERTENSION (H): ICD-10-CM

## 2018-12-05 LAB
ALBUMIN SERPL-MCNC: 3.9 G/DL (ref 3.5–5.7)
ALP SERPL-CCNC: 93 U/L (ref 34–104)
ALT SERPL W P-5'-P-CCNC: 15 U/L (ref 7–52)
ANION GAP SERPL CALCULATED.3IONS-SCNC: 8 MMOL/L (ref 3–14)
AST SERPL W P-5'-P-CCNC: 18 U/L (ref 13–39)
BASOPHILS # BLD AUTO: 0 10E9/L (ref 0–0.2)
BASOPHILS NFR BLD AUTO: 0.6 %
BILIRUB SERPL-MCNC: 0.8 MG/DL (ref 0.3–1)
BUN SERPL-MCNC: 28 MG/DL (ref 7–25)
CALCIUM SERPL-MCNC: 9.2 MG/DL (ref 8.6–10.3)
CHLORIDE SERPL-SCNC: 101 MMOL/L (ref 98–107)
CHOLEST SERPL-MCNC: 141 MG/DL
CO2 SERPL-SCNC: 30 MMOL/L (ref 21–31)
CREAT SERPL-MCNC: 1.38 MG/DL (ref 0.7–1.3)
DIFFERENTIAL METHOD BLD: ABNORMAL
EOSINOPHIL # BLD AUTO: 0.1 10E9/L (ref 0–0.7)
EOSINOPHIL NFR BLD AUTO: 1.7 %
ERYTHROCYTE [DISTWIDTH] IN BLOOD BY AUTOMATED COUNT: 12.2 % (ref 10–15)
GFR SERPL CREATININE-BSD FRML MDRD: 50 ML/MIN/1.7M2
GLUCOSE SERPL-MCNC: 117 MG/DL (ref 70–105)
HBA1C MFR BLD: 5.3 % (ref 4–6)
HCT VFR BLD AUTO: 38.8 % (ref 40–53)
HDLC SERPL-MCNC: 49 MG/DL (ref 23–92)
HGB BLD-MCNC: 13.1 G/DL (ref 13.3–17.7)
IMM GRANULOCYTES # BLD: 0 10E9/L (ref 0–0.4)
IMM GRANULOCYTES NFR BLD: 0.2 %
LDLC SERPL CALC-MCNC: 73 MG/DL
LYMPHOCYTES # BLD AUTO: 1.3 10E9/L (ref 0.8–5.3)
LYMPHOCYTES NFR BLD AUTO: 25.3 %
MCH RBC QN AUTO: 35.8 PG (ref 26.5–33)
MCHC RBC AUTO-ENTMCNC: 33.8 G/DL (ref 31.5–36.5)
MCV RBC AUTO: 106 FL (ref 78–100)
MONOCYTES # BLD AUTO: 0.4 10E9/L (ref 0–1.3)
MONOCYTES NFR BLD AUTO: 7.4 %
NEUTROPHILS # BLD AUTO: 3.4 10E9/L (ref 1.6–8.3)
NEUTROPHILS NFR BLD AUTO: 64.8 %
NONHDLC SERPL-MCNC: 92 MG/DL
PLATELET # BLD AUTO: 167 10E9/L (ref 150–450)
POTASSIUM SERPL-SCNC: 4.1 MMOL/L (ref 3.5–5.1)
PROT SERPL-MCNC: 7.3 G/DL (ref 6.4–8.9)
PSA SERPL-MCNC: 3.58 NG/ML
RBC # BLD AUTO: 3.66 10E12/L (ref 4.4–5.9)
SODIUM SERPL-SCNC: 139 MMOL/L (ref 134–144)
TRIGL SERPL-MCNC: 95 MG/DL
WBC # BLD AUTO: 5.3 10E9/L (ref 4–11)

## 2018-12-05 PROCEDURE — 36415 COLL VENOUS BLD VENIPUNCTURE: CPT | Performed by: FAMILY MEDICINE

## 2018-12-05 PROCEDURE — 99214 OFFICE O/P EST MOD 30 MIN: CPT | Performed by: FAMILY MEDICINE

## 2018-12-05 PROCEDURE — 85025 COMPLETE CBC W/AUTO DIFF WBC: CPT | Performed by: FAMILY MEDICINE

## 2018-12-05 PROCEDURE — 80061 LIPID PANEL: CPT | Performed by: FAMILY MEDICINE

## 2018-12-05 PROCEDURE — 84153 ASSAY OF PSA TOTAL: CPT | Performed by: FAMILY MEDICINE

## 2018-12-05 PROCEDURE — G0463 HOSPITAL OUTPT CLINIC VISIT: HCPCS

## 2018-12-05 PROCEDURE — 80053 COMPREHEN METABOLIC PANEL: CPT | Performed by: FAMILY MEDICINE

## 2018-12-05 PROCEDURE — 83036 HEMOGLOBIN GLYCOSYLATED A1C: CPT | Performed by: FAMILY MEDICINE

## 2018-12-05 RX ORDER — METOPROLOL SUCCINATE 50 MG/1
50 TABLET, EXTENDED RELEASE ORAL DAILY
Qty: 90 TABLET | Status: SHIPPED | OUTPATIENT
Start: 2018-12-05 | End: 2019-12-10

## 2018-12-05 RX ORDER — WARFARIN SODIUM 3 MG/1
TABLET ORAL
Qty: 200 TABLET | Status: SHIPPED | OUTPATIENT
Start: 2018-12-05 | End: 2019-01-10

## 2018-12-05 RX ORDER — METOPROLOL SUCCINATE 50 MG/1
TABLET, EXTENDED RELEASE ORAL
Qty: 90 TABLET | Refills: 3 | Status: CANCELLED | OUTPATIENT
Start: 2018-12-05

## 2018-12-05 ASSESSMENT — PAIN SCALES - GENERAL: PAINLEVEL: NO PAIN (0)

## 2018-12-05 NOTE — MR AVS SNAPSHOT
After Visit Summary   12/5/2018    Angel Garrett    MRN: [id number]           Patient Information     Date Of Birth          1945        Visit Information        Provider Department      12/5/2018 9:00 AM Donte Mckeon MD [facility]        Today's Diagnoses     Essential hypertension    -  1    Hyperlipidemia, unspecified hyperlipidemia type        Chronic atrial fibrillation (H)        Anticoagulation monitoring, INR range 2-3        Obstructive sleep apnea        Prediabetes        Pulmonary hypertension (H)        Long term current use of anticoagulant therapy        Prostate disorder        Screening for AAA (abdominal aortic aneurysm)           Follow-ups after your visit        Your next 10 appointments already scheduled     Dec 11, 2018 11:15 AM CST   Anticoagulation Visit with GH ANTI COAG 1   [facility] ([facility])    [address]  [place] [id number] [phone]            Dec 19, 2018 10:45 AM CST   Anticoagulation Visit with GH ANTI COAG 1   [facility] ([facility])    [address]  [place] [id number] [phone]              Who to contact     If you have questions or need follow up information about today's clinic visit or your schedule please contact [facility] directly at [phone].  Normal or non-critical lab and imaging results will be communicated to you by NowForcehart, letter or phone within 4 business days after the clinic has received the results. If you do not hear from us within 7 days, please contact the clinic through NowForcehart or phone. If you have a critical or abnormal lab result, we will notify you by phone as soon as possible.  Submit refill requests through TinyTap or call your pharmacy and they will forward the refill request to us. Please allow 3 business days for your refill to be completed.           Additional Information About Your Visit        Halotechnicshart Information     Loogla gives you secure access to your electronic health record. If you see a primary care provider, you can also send messages to your care team and make appointments. If you have questions, please call your primary care clinic.  If you do not have a primary care provider, please call [phone] and they will assist you.        Care EveryWhere ID     This is your Care EveryWhere ID. This could be used by other organizations to access your [place] medical records  [id number]        Your Vitals Were     Pulse Temperature Respirations BMI (Body Mass Index)          72 97.9  F (36.6  C) (Temporal) 16 34.54 kg/m2         Blood Pressure from Last 3 Encounters:   12/05/18 110/70   11/19/18 132/64   10/09/18 126/68    Weight from Last 3 Encounters:   12/05/18 214 lb (97.1 kg)   11/19/18 215 lb (97.5 kg)   10/09/18 213 lb 9.6 oz (96.9 kg)              We Performed the Following     Abdominal Aortic Aneurysm Screening/Tracking     CBC with platelets differential     Comprehensive metabolic panel     Hemoglobin A1c     Lipid Profile     Prostate Specific Antigen GH          Today's Medication Changes          These changes are accurate as of 12/5/18  7:50 PM.  If you have any questions, ask your nurse or doctor.               These medicines have changed or have updated prescriptions.        Dose/Directions    acetaminophen 325 MG tablet   Commonly known as:  TYLENOL   This may have changed:  when to take this   Used for:  Closed fracture of multiple ribs of right side, initial encounter        Dose:  650 mg   Take 2 tablets (650 mg) by mouth every 4 hours as needed for mild pain   Quantity:  100 tablet   Refills:  1       metoprolol succinate ER 50 MG 24 hr tablet   Commonly known as:  TOPROL-XL   This may have changed:  See the new instructions.   Used for:  Essential hypertension   Changed by:  Donte Mckeon MD        Dose:  50 mg   Take 1  tablet (50 mg) by mouth daily   Quantity:  90 tablet   Refills:  prn            Where to get your medicines      These medications were sent to [facility] [facility] - [place], [place] - [address]  [address] [place] [address]     Phone:  [phone]     metoprolol succinate ER 50 MG 24 hr tablet    warfarin 3 MG tablet                Primary Care Provider Office Phone # Fax #    Donte CERNA [phone]       [address]  [facility] [address]        Equal Access to Services     [facility]: Hadii aad ku hadasho Soomaali, waaxda luqadaha, qaybta kaalmada adeegyada, sammi shane . So [facility] [phone].    ATENCIÓN: Si habla español, tiene a dennis disposición servicios gratuitos de asistencia lingüística. [facility] [phone].    We comply with applicable federal civil rights laws and [place] laws. We do not discriminate on the basis of race, color, national origin, age, disability, sex, sexual orientation, or gender identity.            Thank you!     Thank you for choosing [facility] AND [facility]  for your care. Our goal is always to provide you with excellent care. Hearing back from our patients is one way we can continue to improve our services. Please take a few minutes to complete the written survey that you may receive in the mail after your visit with us. Thank you!             Your Updated Medication List - Protect others around you: Learn how to safely use, store and throw away your medicines at www.disposemymeds.org.          This list is accurate as of 12/5/18  7:50 PM.  Always use your most recent med list.                   Brand Name Dispense Instructions for use Diagnosis    acetaminophen 325 MG tablet    TYLENOL    100 tablet    Take 2 tablets (650 mg) by mouth every 4 hours as needed for mild pain    Closed fracture of multiple ribs of right side, initial encounter       ADCIRCA PO      Take 20 mg by  mouth 2 times daily        digoxin 250 MCG tablet    LANOXIN    30 tablet    Take 1 tablet (250 mcg) by mouth daily    Chronic atrial fibrillation (H)       metoprolol succinate ER 50 MG 24 hr tablet    TOPROL-XL    90 tablet    Take 1 tablet (50 mg) by mouth daily    Essential hypertension       torsemide 20 MG tablet    DEMADEX    180 tablet    Take 1 tablet (20 mg) by mouth 2 times daily    Chronic atrial fibrillation (H)       warfarin 3 MG tablet    COUMADIN    200 tablet    Take 9 mg x one day/week (Saturdays), and 6 mg x six days/week.  Or as directed by the [facility] clinic.    Chronic atrial fibrillation (H), Long term current use of anticoagulant therapy

## 2018-12-05 NOTE — PROGRESS NOTES
Nursing Notes:   Deloris Chiu LPN  12/5/2018 10:10 AM  Signed  Patient here for yearly medication check up.     Chief Complaint   Patient presents with     Recheck Medication         Medication Reconciliation: complete    Deloris Chiu LPN      SUBJECTIVE:  Angel Garrett  is a 73 year old male who comes in today for complete evaluation and annual review.  I saw him in October for hospital follow-up after right total hip arthroplasty.  He had acute kidney injury at the time of his hospitalization and his lisinopril was held.  When I saw him in follow-up, we did not resume it because his kidney function was still compromised and his blood pressure seemed okay.    I saw him a year ago to establish care after he previously had seen Dr. Miles.  He has pulmonary hypertension, congenital heart disease and chronic atrial fibrillation and continues to follow at [facility].  He is up-to-date on health maintenance issues and had colonography in 2009. He did FIT testing.  [place] with [place] cam[place] the winter. They typically go to [facility] then will head to [place].   He has obstructive sleep apnea for which he uses CPAP and has had no issues.  He is compliant with this and he finds it beneficial.    He is up-to-date on immunizations but has not had Shingrix.  He has not had AAA screening.  He quit smoking in 1984.     He is doing well with his hip. He is still using the cane.  He is 8 weeks out. He goes down to [place] on December 18.      Past Medical, Family, and Social History reviewed and updated as noted below.   ROS is negative except as noted above       Allergies   Allergen Reactions     Pollen Extract Other (See Comments)     Nasal congestion   ,   Family History   Problem Relation Age of Onset     Heart Disease Mother      Heart Disease     Heart Disease Father      Heart Disease,CHF     Other - See Comments Father      PUD     Other - See Comments Brother      Liver or kidney cancer      Family History Negative Sister      Good Health     Diabetes Brother      Diabetes     Family History Negative Brother      Good Health     Family History Negative Brother      Good Health     Family History Negative Brother      Good Health   ,   Current Outpatient Prescriptions   Medication     acetaminophen (TYLENOL) 325 MG tablet     digoxin (LANOXIN) 250 MCG tablet     metoprolol succinate ER (TOPROL-XL) 50 MG 24 hr tablet     Tadalafil, PAH, (ADCIRCA PO)     torsemide (DEMADEX) 20 MG tablet     warfarin (COUMADIN) 3 MG tablet     [DISCONTINUED] metoprolol succinate (TOPROL-XL) 50 MG 24 hr tablet     [DISCONTINUED] warfarin (COUMADIN) 3 MG tablet     No current facility-administered medications for this visit.    , No past medical history on file.,   Patient Active Problem List    Diagnosis Date Noted     Fall at home 05/10/2018     Priority: Medium     Long term current use of anticoagulant therapy 05/10/2018     Priority: Medium     Closed fracture of multiple ribs of right side, initial encounter 05/10/2018     Priority: Medium     Chronic atrial fibrillation (H) 02/11/2018     Priority: Medium     Anticoagulation monitoring, INR range 2-3 02/11/2018     Priority: Medium     Dysphonia 05/22/2017     Priority: Medium     Peritonsillar abscess 05/15/2017     Priority: Medium     Pulmonary hypertension (H) 05/15/2017     Priority: Medium     Obstructive sleep apnea 11/28/2016     Priority: Medium     Hx of congenital heart disease 12/05/2013     Priority: Medium     Hyperlipemia 12/05/2013     Priority: Medium     Hypertension 12/05/2013     Priority: Medium     Obesity 12/05/2013     Priority: Medium     Prediabetes 12/05/2013     Priority: Medium   ,   Past Surgical History:   Procedure Laterality Date     COLONOSCOPY      ~2009,Colonography     OTHER SURGICAL HISTORY      2008,,OPEN CHOLECYSTECTOMY     OTHER SURGICAL HISTORY      2002,46296,CARDIAC SURGERY,Pericardial patch, closure of ASD, repair of  PFO and repair of anomalous pulmonary venous drainage.     OTHER SURGICAL HISTORY      2016,JIJ287,CARDIAC CATHETERIZATION     Right total hip arthroplasty Right 10/2018    Demario Green MD,  [facility]     VASECTOMY      No Comments Provided    and   Social History   Substance Use Topics     Smoking status: Former Smoker     Quit date: 9/17/1984     Smokeless tobacco: Never Used     Alcohol use 3.5 oz/week      Comment: Alcoholic Drinks/day: 1-2 drinks a day     OBJECTIVE:  /70 (BP Location: Right arm, Patient Position: Sitting, Cuff Size: Adult Regular)  Pulse 72  Temp 97.9  F (36.6  C) (Temporal)  Resp 16  Wt 214 lb (97.1 kg)  BMI 34.54 kg/m2   EXAM:  General Appearance: Pleasant, alert, appropriate appearance for age. No acute distress  Head Exam: Normal. Normocephalic, atraumatic.  Eye Exam:  Normal external eye, conjunctiva, lids, cornea. PRICE. EOMI  Ear Exam: Normal TM's bilaterally. Normal auditory canals and external ears. Non-tender.  Nose Exam: Normal external nose, mucus membranes, and septum.  OroPharynx Exam:  Dental hygiene adequate. Normal buccal mucosa. Normal pharynx.  Neck Exam:  Supple, no masses or nodes. No audible bruits  Thyroid Exam: No nodules or enlargement.  Chest/Respiratory Exam: Normal chest wall and respirations. Clear to auscultation.  Cardiovascular Exam: Regular rate and rhythm. S1, S2, no murmur, click, gallop, or rubs.  Gastrointestinal Exam: Soft, non-tender, no masses or organomegaly.  Lymphatic Exam: Non-palpable nodes in neck, clavicular regions.  Musculoskeletal Exam: Back is straight and non-tender, full ROM of upper and lower extremities.  Walks with a cane.  Foot Exam: Left and right foot: good pedal pulses  Skin: no rash or abnormalities  Neurologic Exam: Nonfocal, normal gross motor, tone coordination and no tremor.  Psychiatric Exam: Alert and oriented - appropriate affect.    Results for orders placed or performed in visit on 12/05/18   Lipid Profile    Result Value Ref Range    Cholesterol 141 <200 mg/dL    Triglycerides 95 <150 mg/dL    HDL Cholesterol 49 23 - 92 mg/dL    LDL Cholesterol Calculated 73 <100 mg/dL    Non HDL Cholesterol 92 <130 mg/dL   CBC with platelets differential   Result Value Ref Range    WBC 5.3 4.0 - 11.0 10e9/L    RBC Count 3.66 (L) 4.4 - 5.9 10e12/L    Hemoglobin 13.1 (L) 13.3 - 17.7 g/dL    Hematocrit 38.8 (L) 40.0 - 53.0 %     (H) 78 - 100 fl    MCH 35.8 (H) 26.5 - 33.0 pg    MCHC 33.8 31.5 - 36.5 g/dL    RDW 12.2 10.0 - 15.0 %    Platelet Count 167 150 - 450 10e9/L    Diff Method Automated Method     % Neutrophils 64.8 %    % Lymphocytes 25.3 %    % Monocytes 7.4 %    % Eosinophils 1.7 %    % Basophils 0.6 %    % Immature Granulocytes 0.2 %    Absolute Neutrophil 3.4 1.6 - 8.3 10e9/L    Absolute Lymphocytes 1.3 0.8 - 5.3 10e9/L    Absolute Monocytes 0.4 0.0 - 1.3 10e9/L    Absolute Eosinophils 0.1 0.0 - 0.7 10e9/L    Absolute Basophils 0.0 0.0 - 0.2 10e9/L    Abs Immature Granulocytes 0.0 0 - 0.4 10e9/L   Comprehensive metabolic panel   Result Value Ref Range    Sodium 139 134 - 144 mmol/L    Potassium 4.1 3.5 - 5.1 mmol/L    Chloride 101 98 - 107 mmol/L    Carbon Dioxide 30 21 - 31 mmol/L    Anion Gap 8 3 - 14 mmol/L    Glucose 117 (H) 70 - 105 mg/dL    Urea Nitrogen 28 (H) 7 - 25 mg/dL    Creatinine 1.38 (H) 0.70 - 1.30 mg/dL    GFR Estimate 50 (L) >60 mL/min/1.7m2    GFR Estimate If Black 61 >60 mL/min/1.7m2    Calcium 9.2 8.6 - 10.3 mg/dL    Bilirubin Total 0.8 0.3 - 1.0 mg/dL    Albumin 3.9 3.5 - 5.7 g/dL    Protein Total 7.3 6.4 - 8.9 g/dL    Alkaline Phosphatase 93 34 - 104 U/L    ALT 15 7 - 52 U/L    AST 18 13 - 39 U/L   Hemoglobin A1c   Result Value Ref Range    Hemoglobin A1C 5.3 4.0 - 6.0 %   Prostate Specific Antigen GH   Result Value Ref Range    Prostate Specific Antigen 3.575 (H) <3.100 ng/mL       ASSESSMENT/Plan :    Angel was seen today for recheck medication.    Diagnoses and all orders for this  visit:    Essential hypertension  -     Lipid Profile; Future  -     CBC with platelets differential; Future  -     Comprehensive metabolic panel; Future  -     Lipid Profile  -     CBC with platelets differential  -     Comprehensive metabolic panel  -     metoprolol succinate ER (TOPROL-XL) 50 MG 24 hr tablet; Take 1 tablet (50 mg) by mouth daily    Hyperlipidemia, unspecified hyperlipidemia type  -     Lipid Profile; Future  -     Lipid Profile    Chronic atrial fibrillation (H)  -     warfarin (COUMADIN) 3 MG tablet; Take 9 mg x one day/week (Saturdays), and 6 mg x six days/week.  Or as directed by the protime clinic.    Anticoagulation monitoring, INR range 2-3    Obstructive sleep apnea    Prediabetes  -     Lipid Profile; Future  -     Comprehensive metabolic panel; Future  -     Hemoglobin A1c; Future  -     Lipid Profile  -     Comprehensive metabolic panel  -     Hemoglobin A1c    Pulmonary hypertension (H)    Long term current use of anticoagulant therapy  -     warfarin (COUMADIN) 3 MG tablet; Take 9 mg x one day/week (Saturdays), and 6 mg x six days/week.  Or as directed by the protime clinic.    Prostate disorder  -     Prostate Specific Antigen GH; Future  -     Prostate Specific Antigen GH    Screening for AAA (abdominal aortic aneurysm)  -     Abdominal Aortic Aneurysm Screening/Tracking    Other orders  -     Cancel: metoprolol succinate ER (TOPROL-XL) 50 MG 24 hr tablet;       Will notify of lab results when available. Discussed diet, exercise and healthy lifestyle changes. Continue current medications. Schedule AAA screening.    A total of 25 minutes was spent with the patient, greater than 50% of the time was spent in counseling/discussion of the aforementioned concerns.      Donte Mckeon MD

## 2018-12-05 NOTE — NURSING NOTE
Patient here for yearly medication check up.     Chief Complaint   Patient presents with     Recheck Medication         Medication Reconciliation: complete    Deloris Chiu LPN

## 2018-12-19 ENCOUNTER — ANTICOAGULATION THERAPY VISIT (OUTPATIENT)
Dept: ANTICOAGULATION | Facility: OTHER | Age: 73
End: 2018-12-19
Attending: FAMILY MEDICINE
Payer: MEDICARE

## 2018-12-19 DIAGNOSIS — I48.20 CHRONIC ATRIAL FIBRILLATION (H): ICD-10-CM

## 2018-12-19 LAB — INR POINT OF CARE: 2.1 (ref 2–3)

## 2018-12-19 PROCEDURE — 36416 COLLJ CAPILLARY BLOOD SPEC: CPT | Mod: ZL

## 2018-12-19 NOTE — PROGRESS NOTES
ANTICOAGULATION FOLLOW-UP CLINIC VISIT    Patient Name:  Angel Garrett  Date:  2018  Contact Type:  Face to Face    SUBJECTIVE:     Patient Findings     Positives:   No Problem Findings           OBJECTIVE    INR Protime   Date Value Ref Range Status   2018 2.1 (A) 2.0 - 3.0 Final       ASSESSMENT / PLAN  INR assessment THER    Recheck INR In: 4 WEEKS    INR Location Clinic      Anticoagulation Summary  As of 2018    INR goal:   2.0-3.0   TTR:   81.3 % (5 y)   INR used for dosin.1 (2018)   Warfarin maintenance plan:   9 mg (3 mg x 3) every Sat; 6 mg (3 mg x 2) all other days   Full warfarin instructions:   9 mg every Sat; 6 mg all other days   Weekly warfarin total:   45 mg   Next INR check:   2019   Priority:   INR   Target end date:   Indefinite    Indications    Chronic atrial fibrillation (H) [I48.2]             Anticoagulation Episode Summary     INR check location:       Preferred lab:       Send INR reminders to:    INR    Comments:         Anticoagulation Care Providers     Provider Role Specialty Phone number    Donte Mckeon MD [facility] [phone]            See the Encounter Report to view Anticoagulation Flowsheet and Dosing Calendar (Go to Encounters tab in chart review, and find the Anticoagulation Therapy Visit)        Izabella Verma RN

## 2018-12-28 DIAGNOSIS — I48.20 CHRONIC ATRIAL FIBRILLATION (H): ICD-10-CM

## 2019-01-01 DIAGNOSIS — I48.20 CHRONIC ATRIAL FIBRILLATION (H): ICD-10-CM

## 2019-01-02 RX ORDER — DIGOXIN 250 MCG
250 TABLET ORAL DAILY
Qty: 30 TABLET | Refills: 11 | Status: SHIPPED | OUTPATIENT
Start: 2019-01-02 | End: 2019-12-10

## 2019-01-02 NOTE — TELEPHONE ENCOUNTER
Chart review shows that Rx as requested was last filled as noted below:    Medication Detail      Disp Refills Start End AYSHA   digoxin (LANOXIN) 250 MCG tablet 30 tablet prn 11/29/2018  No   Sig - Route: Take 1 tablet (250 mcg) by mouth daily - Oral   Sent to pharmacy as: digoxin (LANOXIN) 250 MCG tablet   Class: E-Prescribe   Order: [id number]   E-Prescribing Status: Receipt confirmed by pharmacy (11/29/2018  3:55 PM CST)   Printout Tracking     External Result Report   Pharmacy     [facility] [id number] [facility] - [place], [place] - [address]     However, Rx as noted above with PRN refills and pharmacies don't process PRN refills.    Writer is unable to fill Rx as noted above as per RN refill protocol. Writer will update Rx as requested to reflect an annual supply and will route Rx request to PCP for his consideration/approval.    Unable to complete prescription refill per RN Medication Refill Policy. Constantino Nichols 1/2/2019 2:16 PM

## 2019-01-04 RX ORDER — DIGOXIN 250 MCG
250 TABLET ORAL DAILY
Qty: 30 TABLET | Refills: 0 | OUTPATIENT
Start: 2019-01-04

## 2019-01-04 NOTE — TELEPHONE ENCOUNTER
Redundant Refill Request for Digoxin refused;    Medication Detail      Disp Refills Start End AYSHA   digoxin (LANOXIN) 250 MCG tablet 30 tablet 11 1/2/2019  No   Sig - Route: Take 1 tablet (250 mcg) by mouth daily - Oral   Sent to pharmacy as: digoxin (LANOXIN) 250 MCG tablet   Class: E-Prescribe   Order: [id number]   E-Prescribing Status: Receipt confirmed by pharmacy (1/2/2019  5:44 PM CST)   Printout Tracking     External Result Report   Pharmacy     [facility] [id number] [place] [facility] - [place], [place] - [address]     Unable to complete prescription refill per RN Medication Refill Policy. Constantino Nichols 1/4/2019 11:28 AM

## 2019-01-10 DIAGNOSIS — Z79.01 LONG TERM CURRENT USE OF ANTICOAGULANT THERAPY: ICD-10-CM

## 2019-01-10 DIAGNOSIS — I48.20 CHRONIC ATRIAL FIBRILLATION (H): ICD-10-CM

## 2019-01-10 RX ORDER — WARFARIN SODIUM 3 MG/1
TABLET ORAL
Qty: 200 TABLET | Refills: 1 | Status: SHIPPED | OUTPATIENT
Start: 2019-01-10 | End: 2019-12-10

## 2019-01-10 NOTE — TELEPHONE ENCOUNTER
Prescription approved per [facility] Refill Protocol.  Saba Honeycutt RN    1/10/2019  10:43 AM

## 2019-02-06 ENCOUNTER — TRANSFERRED RECORDS (OUTPATIENT)
Dept: HEALTH INFORMATION MANAGEMENT | Facility: OTHER | Age: 74
End: 2019-02-06

## 2019-02-06 ENCOUNTER — TELEPHONE (OUTPATIENT)
Dept: FAMILY MEDICINE | Facility: OTHER | Age: 74
End: 2019-02-06

## 2019-02-06 NOTE — TELEPHONE ENCOUNTER
Nati, with [facility], needs an ICD 10 code for billing purposes. Please call.

## 2019-02-07 NOTE — TELEPHONE ENCOUNTER
Staff at [facility] states this was completed yesterday 02/06/19.  They will call with any questions or concerns.    Krystle Jackson LPN............2/7/2019 9:29 AM

## 2019-02-18 ENCOUNTER — TELEPHONE (OUTPATIENT)
Dept: FAMILY MEDICINE | Facility: OTHER | Age: 74
End: 2019-02-18

## 2019-02-18 NOTE — TELEPHONE ENCOUNTER
The patient called and was wondering if Dr Mckeon received his INR results that were suppose to be faxed to him. I told him I had not seen them. He will have them faxed again and I will watch for them.  Cyndee Berry LPN..................2/18/2019   8:50 AM

## 2019-02-18 NOTE — TELEPHONE ENCOUNTER
I called the patient and told him that the fax was received.  Cyndee Berry LPN..................2/18/2019   11:20 AM

## 2019-05-02 ENCOUNTER — ANTICOAGULATION THERAPY VISIT (OUTPATIENT)
Dept: ANTICOAGULATION | Facility: OTHER | Age: 74
End: 2019-05-02
Attending: FAMILY MEDICINE
Payer: MEDICARE

## 2019-05-02 DIAGNOSIS — I48.20 CHRONIC ATRIAL FIBRILLATION (H): Primary | ICD-10-CM

## 2019-05-02 LAB — INR POINT OF CARE: 3.3 (ref 0.86–1.14)

## 2019-05-02 PROCEDURE — 85610 PROTHROMBIN TIME: CPT | Mod: QW,ZL

## 2019-05-02 NOTE — PROGRESS NOTES
ANTICOAGULATION FOLLOW-UP CLINIC VISIT    Patient Name:  Angel Garrett  Date:  5/2/2019  Contact Type:  Face to Face    SUBJECTIVE:     Patient Findings     Comments:   The patient was assessed for diet, medication, and activity level changes, missed or extra doses, bruising or bleeding, with no problem findings.             OBJECTIVE    INR Protime   Date Value Ref Range Status   05/02/2019 3.3 (A) 0.86 - 1.14 Final       ASSESSMENT / PLAN  INR assessment THER    Recheck INR In: 4 WEEKS    INR Location Clinic      Anticoagulation Summary  As of 5/2/2019    INR goal:   2.0-3.0   TTR:   80.9 % (5.4 y)   INR used for dosing:   3.3! (5/2/2019)   Warfarin maintenance plan:   9 mg (3 mg x 3) every Sat; 6 mg (3 mg x 2) all other days   Full warfarin instructions:   9 mg every Sat; 6 mg all other days   Weekly warfarin total:   45 mg   No change documented:   Saba Honeycutt RN   Next INR check:   5/30/2019   Priority:   INR   Target end date:   Indefinite    Indications    Chronic atrial fibrillation (H) [I48.2]             Anticoagulation Episode Summary     INR check location:       Preferred lab:       Send INR reminders to:    INR    Comments:         Anticoagulation Care Providers     Provider Role Specialty Phone number    Donte Mckeon MD [facility] [phone]            See the Encounter Report to view Anticoagulation Flowsheet and Dosing Calendar (Go to Encounters tab in chart review, and find the Anticoagulation Therapy Visit)        Saba Honeycutt RN

## 2019-06-03 ENCOUNTER — ANTICOAGULATION THERAPY VISIT (OUTPATIENT)
Dept: ANTICOAGULATION | Facility: OTHER | Age: 74
End: 2019-06-03
Attending: FAMILY MEDICINE
Payer: MEDICARE

## 2019-06-03 DIAGNOSIS — I48.20 CHRONIC ATRIAL FIBRILLATION (H): ICD-10-CM

## 2019-06-03 LAB — INR POINT OF CARE: 2.7 (ref 0.86–1.14)

## 2019-06-03 PROCEDURE — 85610 PROTHROMBIN TIME: CPT | Mod: QW,ZL

## 2019-07-15 ENCOUNTER — ANTICOAGULATION THERAPY VISIT (OUTPATIENT)
Dept: ANTICOAGULATION | Facility: OTHER | Age: 74
End: 2019-07-15
Attending: FAMILY MEDICINE
Payer: MEDICARE

## 2019-07-15 DIAGNOSIS — I48.20 CHRONIC ATRIAL FIBRILLATION (H): ICD-10-CM

## 2019-07-15 LAB — INR POINT OF CARE: 2.1 (ref 0.86–1.14)

## 2019-07-15 PROCEDURE — 85610 PROTHROMBIN TIME: CPT | Mod: QW,ZL

## 2019-07-15 NOTE — PROGRESS NOTES
ANTICOAGULATION FOLLOW-UP CLINIC VISIT    Patient Name:  Angel Garrett  Date:  7/15/2019  Contact Type:  Face to Face    SUBJECTIVE:  Patient Findings         Clinical Outcomes     Negatives:   Major bleeding event, Thromboembolic event, Anticoagulation-related hospital admission, Anticoagulation-related ED visit, Anticoagulation-related fatality           OBJECTIVE    INR Protime   Date Value Ref Range Status   07/15/2019 2.1 (A) 0.86 - 1.14 Final       ASSESSMENT / PLAN  INR assessment THER    Recheck INR In: 6 WEEKS    INR Location Clinic      Anticoagulation Summary  As of 7/15/2019    INR goal:   2.0-3.0   TTR:   80.8 % (5.6 y)   INR used for dosin.1 (7/15/2019)   Warfarin maintenance plan:   9 mg (3 mg x 3) every Sat; 6 mg (3 mg x 2) all other days   Full warfarin instructions:   9 mg every Sat; 6 mg all other days   Weekly warfarin total:   45 mg   No change documented:   Saba Honeycutt RN   Next INR check:   2019   Priority:   INR   Target end date:   Indefinite    Indications    Chronic atrial fibrillation (H) [I48.2]             Anticoagulation Episode Summary     INR check location:       Preferred lab:       Send INR reminders to:    INR    Comments:         Anticoagulation Care Providers     Provider Role Specialty Phone number    Donte Mckeon MD [facility] [phone]            See the Encounter Report to view Anticoagulation Flowsheet and Dosing Calendar (Go to Encounters tab in chart review, and find the Anticoagulation Therapy Visit)        Saba Honeycutt RN

## 2019-09-15 DIAGNOSIS — I48.20 CHRONIC ATRIAL FIBRILLATION (H): ICD-10-CM

## 2019-09-18 RX ORDER — TORSEMIDE 20 MG/1
20 TABLET ORAL 2 TIMES DAILY
Qty: 180 TABLET | Refills: 0 | Status: SHIPPED | OUTPATIENT
Start: 2019-09-18 | End: 2019-12-10

## 2019-09-18 NOTE — TELEPHONE ENCOUNTER
"[facility] sent Rx request for the following:      Torsemide 20 mg tablet      Last Prescription Date:   9/6/18  Last Fill Qty/Refills:         180, R-3    Last Office Visit:              12/5/18   Future Office visit:           None noted with PCP    Failed protocol due to elevated creatinine.  Pt has chronic elevated creatinine.  Note to pt on 12/5/18 lab results states \"Your labs look great.\" and OV note from same date states \"continue current medications\".    Prescription approved per [facility] Refill Protocol.   Krystle Goins RN............................ 9/18/2019 11:53 AM          "

## 2019-10-07 ENCOUNTER — OFFICE VISIT (OUTPATIENT)
Dept: PULMONOLOGY | Facility: OTHER | Age: 74
End: 2019-10-07
Attending: INTERNAL MEDICINE
Payer: MEDICARE

## 2019-10-07 ENCOUNTER — ANTICOAGULATION THERAPY VISIT (OUTPATIENT)
Dept: ANTICOAGULATION | Facility: OTHER | Age: 74
End: 2019-10-07
Payer: MEDICARE

## 2019-10-07 VITALS
SYSTOLIC BLOOD PRESSURE: 132 MMHG | WEIGHT: 218.4 LBS | HEIGHT: 67 IN | OXYGEN SATURATION: 98 % | BODY MASS INDEX: 34.28 KG/M2 | RESPIRATION RATE: 18 BRPM | HEART RATE: 64 BPM | DIASTOLIC BLOOD PRESSURE: 70 MMHG

## 2019-10-07 DIAGNOSIS — G47.33 OSA ON CPAP: Primary | ICD-10-CM

## 2019-10-07 DIAGNOSIS — I48.20 CHRONIC ATRIAL FIBRILLATION (H): ICD-10-CM

## 2019-10-07 LAB — INR POINT OF CARE: 2.4 (ref 0.86–1.14)

## 2019-10-07 PROCEDURE — 85610 PROTHROMBIN TIME: CPT | Mod: QW,ZL

## 2019-10-07 PROCEDURE — G0463 HOSPITAL OUTPT CLINIC VISIT: HCPCS

## 2019-10-07 ASSESSMENT — MIFFLIN-ST. JEOR: SCORE: 1676.35

## 2019-10-07 NOTE — PROGRESS NOTES
ANTICOAGULATION FOLLOW-UP CLINIC VISIT    Patient Name:  Angel Garrett  Date:  10/7/2019  Contact Type:  Face to Face    SUBJECTIVE:  Patient Findings         Clinical Outcomes     Negatives:   Major bleeding event, Thromboembolic event, Anticoagulation-related hospital admission, Anticoagulation-related ED visit, Anticoagulation-related fatality           OBJECTIVE    INR Protime   Date Value Ref Range Status   10/07/2019 2.4 (A) 0.86 - 1.14 Final       ASSESSMENT / PLAN  INR assessment THER    Recheck INR In: 6 WEEKS    INR Location Clinic      Anticoagulation Summary  As of 10/7/2019    INR goal:   2.0-3.0   TTR:   81.5 % (5.8 y)   INR used for dosin.4 (10/7/2019)   Warfarin maintenance plan:   9 mg (3 mg x 3) every Sat; 6 mg (3 mg x 2) all other days   Full warfarin instructions:   9 mg every Sat; 6 mg all other days   Weekly warfarin total:   45 mg   No change documented:   Saba Honeycutt RN   Next INR check:   2019   Priority:   INR   Target end date:   Indefinite    Indications    Chronic atrial fibrillation [I48.20]             Anticoagulation Episode Summary     INR check location:       Preferred lab:       Send INR reminders to:    INR    Comments:         Anticoagulation Care Providers     Provider Role Specialty Phone number    Donte Mckeon MD [facility] [phone]            See the Encounter Report to view Anticoagulation Flowsheet and Dosing Calendar (Go to Encounters tab in chart review, and find the Anticoagulation Therapy Visit)        Saba Honeycutt RN

## 2019-10-07 NOTE — PROGRESS NOTES
"Sleep Medicine Progress Note  Angel Garrett  October 7, 2019  [id number]    Chief Complaint: CPAP compliance visit    History of Present Illness: Angel Garrett is a 74 year old male presenting for above complaint.  He has a history of severe obstructive sleep apnea diagnosed on probably some 27th 2016.  He wears his CPAP nightly.  He is worn it every night for the last 365 nights and wore it more than 4 hours per night 361 of 365 nights averaging 7 hours 32 minutes a night.  His apnea hypopnea index is 5.5 however there is a stretch where it was higher.  He says when he gets a cold he has more events.  He is only on a pressure of 9 cm water pressure.  On his sleep study he was having 49 events per hour.  He finds it is working well.  Denies any sleep complaints.      Medications:  Current Outpatient Medications   Medication     acetaminophen (TYLENOL) 325 MG tablet     digoxin (LANOXIN) 250 MCG tablet     metoprolol succinate ER (TOPROL-XL) 50 MG 24 hr tablet     Tadalafil, PAH, (ADCIRCA PO)     torsemide (DEMADEX) 20 MG tablet     warfarin (COUMADIN) 3 MG tablet     No current facility-administered medications for this visit.        Physical Exam:  /70   Pulse 64   Resp 18   Ht 5' 6.5\" (1.689 m)   Wt 218 lb 6.4 oz (99.1 kg)   SpO2 98%   BMI 34.72 kg/m    Limited to vitals    Assessment and Plan:  74 year old male presenting for severe obstructive sleep apnea.  He is compliant with CPAP.  He is benefiting from CPAP.  We did discuss his AHI of approximately 5.  He is not interested in changing the pressure feeling its working well.  Plan will be to continue CPAP at current settings.  An order is written for new supplies.  We discussed sleep follow-up on a as needed or yearly visit.  He does monitor his report with his cell phone and he will contact me if his AHI starts to increase.  Then we would consider a slightly higher pressure.    C: [employer]  "

## 2019-11-18 ENCOUNTER — ANTICOAGULATION THERAPY VISIT (OUTPATIENT)
Dept: ANTICOAGULATION | Facility: OTHER | Age: 74
End: 2019-11-18
Attending: FAMILY MEDICINE
Payer: MEDICARE

## 2019-11-18 DIAGNOSIS — I48.20 CHRONIC ATRIAL FIBRILLATION (H): ICD-10-CM

## 2019-11-18 LAB — INR POINT OF CARE: 2.6 (ref 0.86–1.14)

## 2019-11-18 PROCEDURE — 85610 PROTHROMBIN TIME: CPT | Mod: QW,ZL

## 2019-11-18 NOTE — PROGRESS NOTES
ANTICOAGULATION FOLLOW-UP CLINIC VISIT    Patient Name:  Angel Garrett  Date:  2019  Contact Type:  Face to Face    SUBJECTIVE:  Patient Findings         Clinical Outcomes     Negatives:   Major bleeding event, Thromboembolic event, Anticoagulation-related hospital admission, Anticoagulation-related ED visit, Anticoagulation-related fatality           OBJECTIVE    INR Protime   Date Value Ref Range Status   2019 2.6 (A) 0.86 - 1.14 Final       ASSESSMENT / PLAN  INR assessment THER    Recheck INR In: 4 WEEKS    INR Location Clinic      Anticoagulation Summary  As of 2019    INR goal:   2.0-3.0   TTR:   81.9 % (5.9 y)   INR used for dosin.6 (2019)   Warfarin maintenance plan:   9 mg (3 mg x 3) every Sat; 6 mg (3 mg x 2) all other days   Full warfarin instructions:   9 mg every Sat; 6 mg all other days   Weekly warfarin total:   45 mg   Next INR check:   12/10/2019   Priority:   INR   Target end date:   Indefinite    Indications    Chronic atrial fibrillation [I48.20]             Anticoagulation Episode Summary     INR check location:       Preferred lab:       Send INR reminders to:    INR    Comments:         Anticoagulation Care Providers     Provider Role Specialty Phone number    Donte Mckeon MD [facility] [phone]            See the Encounter Report to view Anticoagulation Flowsheet and Dosing Calendar (Go to Encounters tab in chart review, and find the Anticoagulation Therapy Visit)        Izabella Verma RN

## 2019-11-25 ENCOUNTER — OFFICE VISIT (OUTPATIENT)
Dept: FAMILY MEDICINE | Facility: OTHER | Age: 74
End: 2019-11-25
Attending: NURSE PRACTITIONER
Payer: MEDICARE

## 2019-11-25 VITALS
OXYGEN SATURATION: 97 % | WEIGHT: 220.3 LBS | HEIGHT: 67 IN | RESPIRATION RATE: 18 BRPM | SYSTOLIC BLOOD PRESSURE: 138 MMHG | DIASTOLIC BLOOD PRESSURE: 62 MMHG | BODY MASS INDEX: 34.58 KG/M2 | HEART RATE: 78 BPM | TEMPERATURE: 98.9 F

## 2019-11-25 DIAGNOSIS — R07.0 THROAT PAIN: Primary | ICD-10-CM

## 2019-11-25 DIAGNOSIS — J06.9 VIRAL URI: ICD-10-CM

## 2019-11-25 PROBLEM — N17.9 ACUTE RENAL FAILURE (H): Status: ACTIVE | Noted: 2018-10-04

## 2019-11-25 LAB
SPECIMEN SOURCE: NORMAL
STREP GROUP A PCR: NOT DETECTED

## 2019-11-25 PROCEDURE — G0463 HOSPITAL OUTPT CLINIC VISIT: HCPCS

## 2019-11-25 PROCEDURE — 99213 OFFICE O/P EST LOW 20 MIN: CPT | Performed by: NURSE PRACTITIONER

## 2019-11-25 PROCEDURE — 87651 STREP A DNA AMP PROBE: CPT | Mod: ZL | Performed by: NURSE PRACTITIONER

## 2019-11-25 ASSESSMENT — MIFFLIN-ST. JEOR: SCORE: 1692.9

## 2019-11-25 ASSESSMENT — PAIN SCALES - GENERAL: PAINLEVEL: SEVERE PAIN (7)

## 2019-11-25 NOTE — NURSING NOTE
"Chief Complaint   Patient presents with     Throat Problem     Patient is here for a sore throat and pain in the left ear that started 2 days ago. Patient states he has tried Tylenol with a little relief.     Initial /62   Pulse 78   Temp 98.9  F (37.2  C) (Tympanic)   Resp 18   Ht 1.702 m (5' 7\")   Wt 99.9 kg (220 lb 4.8 oz)   SpO2 97%   BMI 34.50 kg/m   Estimated body mass index is 34.5 kg/m  as calculated from the following:    Height as of this encounter: 1.702 m (5' 7\").    Weight as of this encounter: 99.9 kg (220 lb 4.8 oz).  Medication Reconciliation: complete    Maria E Danielle LPN  "

## 2019-11-25 NOTE — PROGRESS NOTES
Subjective     Angel Garrett is a 74 year old male who presents to clinic today for the following health issues:    HPI   ENT Symptoms             Symptoms: cc Present Absent Comment   Fever/Chills   x    Fatigue  x  slight   Muscle Aches   x    Eye Irritation   x    Sneezing   x    Nasal Eamon/Drg  x  Some, yellow snot   Sinus Pressure/Pain   x    Loss of smell   x    Dental pain   x    Sore Throat  x  majorly sore   Swollen Glands  x     Ear Pain/Fullness  x  L ear hurts a lot when swallowing   Cough   x    Wheeze   x    Chest Pain   x    Shortness of breath   x    Rash   x    Other   x      Symptom duration:  2 days   Symptom severity:  severe   Treatments tried:  tylenol, hot liquids   Contacts:  none known     Patient Active Problem List   Diagnosis     Dysphonia     Chronic atrial fibrillation     Anticoagulation monitoring, INR range 2-3     Hx of congenital heart disease     Hyperlipemia     Hypertension     Obesity     Obstructive sleep apnea     Peritonsillar abscess     Prediabetes     Pulmonary hypertension (H)     Fall at home     Long term current use of anticoagulant therapy     Closed fracture of multiple ribs of right side, initial encounter     Past Surgical History:   Procedure Laterality Date     COLONOSCOPY  2009,Colonography     OTHER SURGICAL HISTORY      ,,OPEN CHOLECYSTECTOMY     OTHER SURGICAL HISTORY      ,20452,CARDIAC SURGERY,Pericardial patch, closure of ASD, repair of PFO and repair of anomalous pulmonary venous drainage.     OTHER SURGICAL HISTORY      2016,FNI156,CARDIAC CATHETERIZATION     Right total hip arthroplasty Right 10/2018    Demario Green MD,  [facility]     VASECTOMY      No Comments Provided       Social History     Tobacco Use     Smoking status: Former Smoker     Last attempt to quit: 1984     Years since quittin.2     Smokeless tobacco: Never Used   Substance Use Topics     Alcohol use: Yes     Alcohol/week: 5.8 standard  "drinks     Comment: Alcoholic Drinks/day: 1-2 drinks a day     Family History   Problem Relation Age of Onset     Heart Disease Mother         Heart Disease     Heart Disease Father         Heart Disease,CHF     Other - See Comments Father         PUD     Other - See Comments Brother         Liver or kidney cancer     Family History Negative Sister         Good Health     Diabetes Brother         Diabetes     Family History Negative Brother         Good Health     Family History Negative Brother         Good Health     Family History Negative Brother         Good Health         Current Outpatient Medications   Medication Sig Dispense Refill     acetaminophen (TYLENOL) 325 MG tablet Take 2 tablets (650 mg) by mouth every 4 hours as needed for mild pain (Patient taking differently: Take 650 mg by mouth 2 times daily as needed for mild pain ) 100 tablet 1     digoxin (LANOXIN) 250 MCG tablet Take 1 tablet (250 mcg) by mouth daily 30 tablet 11     metoprolol succinate ER (TOPROL-XL) 50 MG 24 hr tablet Take 1 tablet (50 mg) by mouth daily 90 tablet prn     Tadalafil, PAH, (ADCIRCA PO) Take 20 mg by mouth 2 times daily       torsemide (DEMADEX) 20 MG tablet TAKE 1 TABLET (20 MG) BY MOUTH 2 TIMES DAILY 180 tablet 0     warfarin (COUMADIN) 3 MG tablet Take 9 mg x one day/week (Saturdays), and 6 mg x six days/week.  Or as directed by the protime clinic. 200 tablet 1     Allergies   Allergen Reactions     Pollen Extract Other (See Comments)     Nasal congestion  Nasal congestion       Reviewed and updated as needed this visit by Provider         Review of Systems   ROS COMP: As above      Objective    /62   Pulse 78   Temp 98.9  F (37.2  C) (Tympanic)   Resp 18   Ht 1.702 m (5' 7\")   Wt 99.9 kg (220 lb 4.8 oz)   SpO2 97%   BMI 34.50 kg/m    Body mass index is 34.5 kg/m .  Physical Exam   GENERAL: alert, no distress, obese and elderly  EYES: Eyes grossly normal to inspection, PERRL and conjunctivae and sclerae " normal  HENT: normal cephalic/atraumatic, right ear: clear effusion, left ear: slightly cloudy effusion without bulging or erythema, nasal mucosa edematous , rhinorrhea clear, oropharynx clear, oral mucous membranes moist, sinuses: not tender and cobblestoning of posterior oropharynx  NECK: no adenopathy, no asymmetry, masses, or scars and thyroid normal to palpation  RESP: lungs clear to auscultation - no rales, rhonchi or wheezes  CV: regular rate and rhythm, normal S1 S2, no S3 or S4, no murmur, click or rub, no peripheral edema and peripheral pulses strong  PSYCH: mentation appears normal, affect normal/bright    Diagnostic Test Results:  Labs reviewed in Epic  Results for orders placed or performed in visit on 11/25/19   Group A Streptococcus PCR Throat Swab     Status: None   Result Value Ref Range    Specimen Description Throat     Strep Group A PCR Not Detected NDET^Not Detected           Assessment & Plan     1. Throat pain  Strep negative as above.   - Group A Streptococcus PCR Throat Swab    2. Viral URI  Symptomatic treatments recommended.  -Discussed that antibiotics would not help symptoms of viral URI. Education provided on symptoms of secondary bacterial infection such as new fever, chills, rigors, shortness of breath, increased work of breathing, that can occur with viral URI and need for further evaluation, if they occur.   - Ensure you are staying hydrated by drinking plenty of fluids or eating foods such as popsicles, jello, pudding.  - Honey and Salt water gurgles can help soothe sore throat  - Rest  - Humidifier can help with congestion and help keep mucus membranes such as throat and nose from drying out.  - Sleeping slightly propped up can help with congestion and postnasal drainage that can worsen cough at bedtime.  - As long as you have never been told to take Tylenol you can use them to manage fever and body aches per package instructions   - May take Coricidin if needed for cold symptoms,  stay away from other OTC cold and cough medicines.  - If sudden onset of new fever, worsening symptoms return for further evaluation.  - OTC antihistamine such as Allegra, Zyrtec, Claritin (generic is okay) can help with nasal/sinus congestion and OTC nasal steroid such as Flonase or nasal saline/neti pot can help decrease sinus inflammation to help with congestion.      Jeri Voss NP  [facility]

## 2019-11-25 NOTE — PATIENT INSTRUCTIONS
Coricidin can help with URI symptoms and is safe to take with high blood pressure and your other current medications.     Antihistamines such as claritin, allegra or zyrtec can help calm nasal inflammation, which is the cause of your symptoms at this time.     Rest as much as able, increase fluids.     Warm salt water gurgles, cough drops, chloraseptic spray and a tbsp of honey may help calm the throat pain.     Any fevers, difficulty breathing, shortness of breath, uncontrollable shaking/chills all indicate a need to return to the clinic for re-evaluation.

## 2019-11-26 ENCOUNTER — APPOINTMENT (OUTPATIENT)
Dept: CT IMAGING | Facility: OTHER | Age: 74
End: 2019-11-26
Attending: FAMILY MEDICINE
Payer: MEDICARE

## 2019-11-26 ENCOUNTER — HOSPITAL ENCOUNTER (EMERGENCY)
Facility: OTHER | Age: 74
Discharge: ANOTHER HOSPITAL | End: 2019-11-26
Attending: FAMILY MEDICINE | Admitting: FAMILY MEDICINE
Payer: MEDICARE

## 2019-11-26 ENCOUNTER — TRANSFERRED RECORDS (OUTPATIENT)
Dept: HEALTH INFORMATION MANAGEMENT | Facility: OTHER | Age: 74
End: 2019-11-26

## 2019-11-26 VITALS
OXYGEN SATURATION: 94 % | DIASTOLIC BLOOD PRESSURE: 92 MMHG | SYSTOLIC BLOOD PRESSURE: 154 MMHG | WEIGHT: 220 LBS | RESPIRATION RATE: 21 BRPM | HEIGHT: 67 IN | TEMPERATURE: 99.5 F | HEART RATE: 78 BPM | BODY MASS INDEX: 34.53 KG/M2

## 2019-11-26 DIAGNOSIS — R79.1 ELEVATED INR: ICD-10-CM

## 2019-11-26 DIAGNOSIS — J39.0 RETROPHARYNGEAL ABSCESS: ICD-10-CM

## 2019-11-26 LAB
ALBUMIN SERPL-MCNC: 4 G/DL (ref 3.5–5.7)
ALBUMIN UR-MCNC: 100 MG/DL
ALP SERPL-CCNC: 73 U/L (ref 34–104)
ALT SERPL W P-5'-P-CCNC: 12 U/L (ref 7–52)
AMORPH CRY #/AREA URNS HPF: ABNORMAL /HPF
ANION GAP SERPL CALCULATED.3IONS-SCNC: 9 MMOL/L (ref 3–14)
APPEARANCE UR: ABNORMAL
AST SERPL W P-5'-P-CCNC: 21 U/L (ref 13–39)
BASOPHILS # BLD AUTO: 0 10E9/L (ref 0–0.2)
BASOPHILS NFR BLD AUTO: 0.2 %
BILIRUB SERPL-MCNC: 3.7 MG/DL (ref 0.3–1)
BILIRUB UR QL STRIP: ABNORMAL
BUN SERPL-MCNC: 29 MG/DL (ref 7–25)
CALCIUM SERPL-MCNC: 9 MG/DL (ref 8.6–10.3)
CHLORIDE SERPL-SCNC: 97 MMOL/L (ref 98–107)
CO2 SERPL-SCNC: 29 MMOL/L (ref 21–31)
COLOR UR AUTO: ABNORMAL
CREAT SERPL-MCNC: 1.53 MG/DL (ref 0.7–1.3)
CRP SERPL-MCNC: 17.9 MG/L
DIFFERENTIAL METHOD BLD: ABNORMAL
EOSINOPHIL # BLD AUTO: 0 10E9/L (ref 0–0.7)
EOSINOPHIL NFR BLD AUTO: 0 %
ERYTHROCYTE [DISTWIDTH] IN BLOOD BY AUTOMATED COUNT: 12.9 % (ref 10–15)
GFR SERPL CREATININE-BSD FRML MDRD: 45 ML/MIN/{1.73_M2}
GLUCOSE SERPL-MCNC: 146 MG/DL (ref 70–105)
GLUCOSE UR STRIP-MCNC: 100 MG/DL
HCT VFR BLD AUTO: 40.5 % (ref 40–53)
HGB BLD-MCNC: 13.7 G/DL (ref 13.3–17.7)
HGB UR QL STRIP: ABNORMAL
IMM GRANULOCYTES # BLD: 0.4 10E9/L (ref 0–0.4)
IMM GRANULOCYTES NFR BLD: 2.2 %
INR PPP: 4.05 (ref 0–1.3)
KETONES UR STRIP-MCNC: NEGATIVE MG/DL
LACTATE SERPL-SCNC: 2.3 MMOL/L (ref 0.5–2.2)
LACTATE SERPL-SCNC: 4.5 MMOL/L (ref 0.5–2.2)
LEUKOCYTE ESTERASE UR QL STRIP: NEGATIVE
LYMPHOCYTES # BLD AUTO: 0.9 10E9/L (ref 0.8–5.3)
LYMPHOCYTES NFR BLD AUTO: 5.1 %
MCH RBC QN AUTO: 36.4 PG (ref 26.5–33)
MCHC RBC AUTO-ENTMCNC: 33.8 G/DL (ref 31.5–36.5)
MCV RBC AUTO: 108 FL (ref 78–100)
MONOCYTES # BLD AUTO: 0.6 10E9/L (ref 0–1.3)
MONOCYTES NFR BLD AUTO: 3.5 %
NEUTROPHILS # BLD AUTO: 15.9 10E9/L (ref 1.6–8.3)
NEUTROPHILS NFR BLD AUTO: 89 %
NITRATE UR QL: POSITIVE
PH UR STRIP: 6 PH (ref 5–9)
PLATELET # BLD AUTO: 149 10E9/L (ref 150–450)
POTASSIUM SERPL-SCNC: 3.8 MMOL/L (ref 3.5–5.1)
PROCALCITONIN SERPL-MCNC: 7 NG/ML
PROT SERPL-MCNC: 7.4 G/DL (ref 6.4–8.9)
RBC # BLD AUTO: 3.76 10E12/L (ref 4.4–5.9)
RBC #/AREA URNS AUTO: ABNORMAL /HPF
SODIUM SERPL-SCNC: 135 MMOL/L (ref 134–144)
SOURCE: ABNORMAL
SP GR UR STRIP: >1.03 (ref 1–1.03)
UROBILINOGEN UR STRIP-ACNC: 2 EU/DL (ref 0.2–1)
WBC # BLD AUTO: 17.9 10E9/L (ref 4–11)
WBC #/AREA URNS AUTO: ABNORMAL /HPF

## 2019-11-26 PROCEDURE — 80053 COMPREHEN METABOLIC PANEL: CPT | Performed by: FAMILY MEDICINE

## 2019-11-26 PROCEDURE — 83605 ASSAY OF LACTIC ACID: CPT | Performed by: FAMILY MEDICINE

## 2019-11-26 PROCEDURE — 25000128 H RX IP 250 OP 636: Performed by: FAMILY MEDICINE

## 2019-11-26 PROCEDURE — 96375 TX/PRO/DX INJ NEW DRUG ADDON: CPT | Mod: XU | Performed by: FAMILY MEDICINE

## 2019-11-26 PROCEDURE — 99285 EMERGENCY DEPT VISIT HI MDM: CPT | Mod: Z6 | Performed by: FAMILY MEDICINE

## 2019-11-26 PROCEDURE — 81001 URINALYSIS AUTO W/SCOPE: CPT | Performed by: FAMILY MEDICINE

## 2019-11-26 PROCEDURE — C9132 KCENTRA, PER I.U.: HCPCS

## 2019-11-26 PROCEDURE — 86140 C-REACTIVE PROTEIN: CPT | Performed by: FAMILY MEDICINE

## 2019-11-26 PROCEDURE — 70491 CT SOFT TISSUE NECK W/DYE: CPT

## 2019-11-26 PROCEDURE — 25500064 ZZH RX 255 OP 636: Performed by: FAMILY MEDICINE

## 2019-11-26 PROCEDURE — 25000555 ZZHC RX FACTOR IP 250 OP 636

## 2019-11-26 PROCEDURE — 99285 EMERGENCY DEPT VISIT HI MDM: CPT | Mod: 25 | Performed by: FAMILY MEDICINE

## 2019-11-26 PROCEDURE — 87040 BLOOD CULTURE FOR BACTERIA: CPT | Performed by: FAMILY MEDICINE

## 2019-11-26 PROCEDURE — 85025 COMPLETE CBC W/AUTO DIFF WBC: CPT | Performed by: FAMILY MEDICINE

## 2019-11-26 PROCEDURE — 36415 COLL VENOUS BLD VENIPUNCTURE: CPT

## 2019-11-26 PROCEDURE — 25800030 ZZH RX IP 258 OP 636: Performed by: FAMILY MEDICINE

## 2019-11-26 PROCEDURE — 85610 PROTHROMBIN TIME: CPT | Performed by: FAMILY MEDICINE

## 2019-11-26 PROCEDURE — 36415 COLL VENOUS BLD VENIPUNCTURE: CPT | Performed by: FAMILY MEDICINE

## 2019-11-26 PROCEDURE — 96368 THER/DIAG CONCURRENT INF: CPT | Performed by: FAMILY MEDICINE

## 2019-11-26 PROCEDURE — 84145 PROCALCITONIN (PCT): CPT | Performed by: FAMILY MEDICINE

## 2019-11-26 PROCEDURE — 96365 THER/PROPH/DIAG IV INF INIT: CPT | Mod: XU | Performed by: FAMILY MEDICINE

## 2019-11-26 RX ORDER — SODIUM CHLORIDE, SODIUM LACTATE, POTASSIUM CHLORIDE, CALCIUM CHLORIDE 600; 310; 30; 20 MG/100ML; MG/100ML; MG/100ML; MG/100ML
INJECTION, SOLUTION INTRAVENOUS CONTINUOUS
Status: DISCONTINUED | OUTPATIENT
Start: 2019-11-26 | End: 2019-11-26 | Stop reason: HOSPADM

## 2019-11-26 RX ORDER — SODIUM CHLORIDE 9 MG/ML
1000 INJECTION, SOLUTION INTRAVENOUS CONTINUOUS
Status: DISCONTINUED | OUTPATIENT
Start: 2019-11-26 | End: 2019-11-26 | Stop reason: HOSPADM

## 2019-11-26 RX ORDER — IODIXANOL 320 MG/ML
100 INJECTION, SOLUTION INTRAVASCULAR ONCE
Status: COMPLETED | OUTPATIENT
Start: 2019-11-26 | End: 2019-11-26

## 2019-11-26 RX ORDER — DEXAMETHASONE SODIUM PHOSPHATE 4 MG/ML
10 INJECTION, SOLUTION INTRA-ARTICULAR; INTRALESIONAL; INTRAMUSCULAR; INTRAVENOUS; SOFT TISSUE ONCE
Status: COMPLETED | OUTPATIENT
Start: 2019-11-26 | End: 2019-11-26

## 2019-11-26 RX ADMIN — SODIUM CHLORIDE 1000 ML: 9 INJECTION, SOLUTION INTRAVENOUS at 13:33

## 2019-11-26 RX ADMIN — DEXAMETHASONE SODIUM PHOSPHATE 10 MG: 4 INJECTION, SOLUTION INTRAMUSCULAR; INTRAVENOUS at 13:33

## 2019-11-26 RX ADMIN — PHYTONADIONE 10 MG: 10 INJECTION, EMULSION INTRAMUSCULAR; INTRAVENOUS; SUBCUTANEOUS at 15:36

## 2019-11-26 RX ADMIN — TAZOBACTAM SODIUM AND PIPERACILLIN SODIUM 3.38 G: 375; 3 INJECTION, SOLUTION INTRAVENOUS at 13:47

## 2019-11-26 RX ADMIN — IODIXANOL 100 ML: 320 INJECTION, SOLUTION INTRAVASCULAR at 14:19

## 2019-11-26 RX ADMIN — SODIUM CHLORIDE, POTASSIUM CHLORIDE, SODIUM LACTATE AND CALCIUM CHLORIDE 1000 ML: 600; 310; 30; 20 INJECTION, SOLUTION INTRAVENOUS at 15:19

## 2019-11-26 RX ADMIN — VANCOMYCIN HYDROCHLORIDE 2000 MG: 1 INJECTION, POWDER, LYOPHILIZED, FOR SOLUTION INTRAVENOUS at 14:02

## 2019-11-26 ASSESSMENT — ENCOUNTER SYMPTOMS
FACIAL SWELLING: 1
UNEXPECTED WEIGHT CHANGE: 0
CHEST TIGHTNESS: 0
SINUS PRESSURE: 0
COUGH: 0
ACTIVITY CHANGE: 1
DIAPHORESIS: 0
FATIGUE: 0
CHOKING: 0
RHINORRHEA: 0
CARDIOVASCULAR NEGATIVE: 1
FEVER: 1
WHEEZING: 0
SHORTNESS OF BREATH: 1
APPETITE CHANGE: 1
MUSCULOSKELETAL NEGATIVE: 1
VOICE CHANGE: 1
SINUS PAIN: 0
CHILLS: 1
STRIDOR: 0
TROUBLE SWALLOWING: 1
GASTROINTESTINAL NEGATIVE: 1
PSYCHIATRIC NEGATIVE: 1
NEUROLOGICAL NEGATIVE: 1
SORE THROAT: 1

## 2019-11-26 ASSESSMENT — MIFFLIN-ST. JEOR: SCORE: 1691.54

## 2019-11-26 NOTE — PHARMACY-VANCOMYCIN DOSING SERVICE
"Pharmacy Consult- Vancomycin Assessment    Angel Garrett is a 74 year old male admitted on 2019.    Vancomycin has been ordered per MD, for the indication of: Abscess    Current Antibiotic Regimen Includes: Zosyn, Vancomycin x 1    Patient Active Problem List   Diagnosis     Dysphonia     Chronic atrial fibrillation     Anticoagulation monitoring, INR range 2-3     Hx of congenital heart disease     Hyperlipemia     Hypertension     Obesity     Obstructive sleep apnea     Peritonsillar abscess     Prediabetes     Pulmonary hypertension (H)     Fall at home     Long term current use of anticoagulant therapy     Closed fracture of multiple ribs of right side, initial encounter     Atrial septal defect     Arteriosclerosis of coronary artery     Acute renal failure (H)       Allergies (and reaction): Pollen extract    Most recent flowsheet Weight: 99.8 kg (220 lb)  Most recent flowsheet Height: 170.2 cm (5' 7\")    No intake or output data in the 24 hours ending 19 1348    Tmax = Temp (24hrs), Av.5  F (37.5  C), Min:99.5  F (37.5  C), Max:99.5  F (37.5  C)      Recent Labs   Lab Test 19  1330   WBC 17.9*       Recent Labs   Lab Test 18  0945 10/30/18  1137 10/09/18  1118 05/10/18  1420   BUN 28*  --  60* 40*   CR 1.38* 1.26 1.64* 1.52*       CrCl cannot be calculated (Patient's most recent lab result is older than the maximum 90 days allowed.).    Cultures Pending: blood x 2    Culture Results: pending    Plan: Give Vancomycin 2000 mg x 1 in ER.  Will adjust if clinically indicated if medication continues.    Regimen Start Date: 19  Recommended Dose: 2000 mg , which provides 20 mg/kg/dose  Interval: ONCE  Goal Trough: 10-15 mg/L    Thank You for the consult. Will continue to follow.    Gale Angeles RPH ....................  2019   1:48 PM  "

## 2019-11-26 NOTE — ED PROVIDER NOTES
History     Chief Complaint   Patient presents with     Pharyngitis     Oral Swelling     HPI  Angel Garrett is a 74 year old male who presents with symptoms of sore throat and ear ache that started 6 days ago . [facility] . Negative strep Was sent home with instructions to take benadryl and tylenol . Patient states since that time symptoms have gotten progressively worse and he feels like he is having difficulty with his air way closing . He can still swallow liquids although quite painful . Has quite a bit of phlegm and mucus . He had to sit up last night in order to sleep due to congestion he was experiencing . NO fever.  NO chills NO sweats . No body aches.  Patient states he has history of abcess tooth for which he was hospitalized in the pain     Allergies:  Allergies   Allergen Reactions     Pollen Extract Other (See Comments)     Nasal congestion  Nasal congestion       Problem List:    Patient Active Problem List    Diagnosis Date Noted     Acute renal failure (H) 10/04/2018     Priority: Medium     Fall at home 05/10/2018     Priority: Medium     Long term current use of anticoagulant therapy 05/10/2018     Priority: Medium     Closed fracture of multiple ribs of right side, initial encounter 05/10/2018     Priority: Medium     Chronic atrial fibrillation 02/11/2018     Priority: Medium     Anticoagulation monitoring, INR range 2-3 02/11/2018     Priority: Medium     Dysphonia 05/22/2017     Priority: Medium     Peritonsillar abscess 05/15/2017     Priority: Medium     Pulmonary hypertension (H) 05/15/2017     Priority: Medium     Obstructive sleep apnea 11/28/2016     Priority: Medium     Arteriosclerosis of coronary artery 04/15/2016     Priority: Medium     Hx of congenital heart disease 12/05/2013     Priority: Medium     Hyperlipemia 12/05/2013     Priority: Medium     Hypertension 12/05/2013     Priority: Medium     Obesity 12/05/2013     Priority: Medium      Prediabetes 2013     Priority: Medium     Atrial septal defect 2003     Priority: Medium        Past Medical History:    History reviewed. No pertinent past medical history.    Past Surgical History:    Past Surgical History:   Procedure Laterality Date     COLONOSCOPY  2009,Colonography     OTHER SURGICAL HISTORY      ,,OPEN CHOLECYSTECTOMY     OTHER SURGICAL HISTORY      ,89608,CARDIAC SURGERY,Pericardial patch, closure of ASD, repair of PFO and repair of anomalous pulmonary venous drainage.     OTHER SURGICAL HISTORY      2016,GDS529,CARDIAC CATHETERIZATION     Right total hip arthroplasty Right 10/2018    Demario Green MD,  [facility]     VASECTOMY      No Comments Provided       Family History:    Family History   Problem Relation Age of Onset     Heart Disease Mother         Heart Disease     Heart Disease Father         Heart Disease,CHF     Other - See Comments Father         PUD     Other - See Comments Brother         Liver or kidney cancer     Family History Negative Sister         Good Health     Diabetes Brother         Diabetes     Family History Negative Brother         Good Health     Family History Negative Brother         Good Health     Family History Negative Brother         Good Health       Social History:  Marital Status:   [2]  Social History     Tobacco Use     Smoking status: Former Smoker     Last attempt to quit: 1984     Years since quittin.2     Smokeless tobacco: Never Used   Substance Use Topics     Alcohol use: Yes     Alcohol/week: 5.8 standard drinks     Comment: Alcoholic Drinks/day: 1-2 drinks a day     Drug use: No     Comment: Drug use: No        Medications:    acetaminophen (TYLENOL) 325 MG tablet  digoxin (LANOXIN) 250 MCG tablet  metoprolol succinate ER (TOPROL-XL) 50 MG 24 hr tablet  Tadalafil, PAH, (ADCIRCA PO)  torsemide (DEMADEX) 20 MG tablet  warfarin (COUMADIN) 3 MG tablet          Review of Systems  "  Constitutional: Positive for activity change, appetite change, chills and fever. Negative for diaphoresis, fatigue and unexpected weight change.   HENT: Positive for congestion, ear pain, facial swelling, sore throat, trouble swallowing and voice change. Negative for drooling, ear discharge, hearing loss, mouth sores, nosebleeds, postnasal drip, rhinorrhea, sinus pressure, sinus pain, sneezing and tinnitus.    Respiratory: Positive for shortness of breath. Negative for cough, choking, chest tightness, wheezing and stridor.    Cardiovascular: Negative.    Gastrointestinal: Negative.    Genitourinary: Negative.    Musculoskeletal: Negative.    Neurological: Negative.    Psychiatric/Behavioral: Negative.    All other systems reviewed and are negative.      Physical Exam   BP: (!) 147/66  Pulse: 83  Temp: 99.5  F (37.5  C)  Resp: 24  Height: 170.2 cm (5' 7\")  Weight: 99.8 kg (220 lb)  SpO2: 97 %      Physical Exam  Vitals signs and nursing note reviewed.   Constitutional:       General: He is in acute distress.      Appearance: He is well-developed. He is ill-appearing.   HENT:      Head: Normocephalic and atraumatic.      Right Ear: Tympanic membrane normal.      Left Ear: Tympanic membrane normal.      Mouth/Throat:      Pharynx: Pharyngeal swelling and posterior oropharyngeal erythema present.      Tonsils: No tonsillar abscesses.      Comments: Marked left peritonsillar erythema edema with shift of uvula. Tender enlarged submandibular glands on left Tender left anterior cervical adenopathy   Eyes:      Conjunctiva/sclera: Conjunctivae normal.   Cardiovascular:      Rate and Rhythm: Normal rate and regular rhythm.      Heart sounds: Normal heart sounds.   Lymphadenopathy:      Cervical: Cervical adenopathy present.   Skin:     General: Skin is warm.      Capillary Refill: Capillary refill takes less than 2 seconds.   Neurological:      Mental Status: He is alert.         ED Course        Procedures    Patient " presents to ER for evaluation of shortness of breath and sore throat . Patient triaged immediately to exam room 3. Pulse oximetry placed. Patient with normal oximetry Peripheral IV inserted. Exam significant for marked L  peritonsillar edema and left submandibular swelling .Decadron 10 mg IV given, Vancomycin , zosyn . Labs and CT ordered . Fluid bolus given as patient with poor po intake over the last 24 hours . Oxygen saturations 95 % on room air. CT scan returned showing extensive retropharyngeal abcess with narrowing of airway most pronounced at level of vocal cords. Labs returning consistent with sepsis . Sepsis bundle initiated with IV fluid bolus. Consult with DR Griffin hospitalist [facility] regarding need for transfer due to need for ENT services. Anesthesia consulted for airway placement prior to transfer. Discussed indications for urgent transfer and airway protection with intubation prior to transer. Patient and spouse agreeable. Prior to tube placement however DR Erickson pressley ENT arrived to evaluate patient. Direct video laryngoscopy performed and he felt no need for ET tube placement prior to air transfer but does desire INR reversal as plan for operative intervention upon arrival to [facility]. INR currently 4.0 . INR reversal orders with Kcentra and Vitamin K prior to transfer by [facility]. Patient transferred in stable but serious condition to [facility] ER  Accepting providers DR Griffin and DR Lim .Vitals stable at time of transfer. Patient with IV fluid bolus, Kcentra, vitamin K, antibiotics running at time of transfer   Results for orders placed or performed during the hospital encounter of 11/26/19   CBC with platelets differential     Status: Abnormal   Result Value Ref Range    WBC 17.9 (H) 4.0 - 11.0 10e9/L    RBC Count 3.76 (L) 4.4 - 5.9 10e12/L    Hemoglobin 13.7 13.3 - 17.7 g/dL    Hematocrit 40.5 40.0 - 53.0 %     (H) 78 - 100 fl    MCH 36.4 (H) 26.5 - 33.0 pg    MCHC 33.8  31.5 - 36.5 g/dL    RDW 12.9 10.0 - 15.0 %    Platelet Count 149 (L) 150 - 450 10e9/L    Diff Method Automated Method     % Neutrophils 89.0 %    % Lymphocytes 5.1 %    % Monocytes 3.5 %    % Eosinophils 0.0 %    % Basophils 0.2 %    % Immature Granulocytes 2.2 %    Absolute Neutrophil 15.9 (H) 1.6 - 8.3 10e9/L    Absolute Lymphocytes 0.9 0.8 - 5.3 10e9/L    Absolute Monocytes 0.6 0.0 - 1.3 10e9/L    Absolute Eosinophils 0.0 0.0 - 0.7 10e9/L    Absolute Basophils 0.0 0.0 - 0.2 10e9/L    Abs Immature Granulocytes 0.4 0 - 0.4 10e9/L   Lactic acid     Status: Abnormal   Result Value Ref Range    Lactic Acid 2.3 (H) 0.5 - 2.2 mmol/L   CRP inflammation     Status: Abnormal   Result Value Ref Range    CRP Inflammation 17.9 (H) <0.5 mg/L   Procalcitonin     Status: Abnormal   Result Value Ref Range    Procalcitonin 7.00 (HH) ng/ml   INR     Status: Abnormal   Result Value Ref Range    INR 4.05 (H) 0 - 1.3   *UA reflex to Microscopic     Status: Abnormal   Result Value Ref Range    Color Urine Nova     Appearance Urine Slightly Cloudy     Glucose Urine 100 (A) NEG^Negative mg/dL    Bilirubin Urine Moderate (A) NEG^Negative    Ketones Urine Negative NEG^Negative mg/dL    Specific Gravity Urine >1.030 (H) 1.000 - 1.030    Blood Urine Small (A) NEG^Negative    pH Urine 6.0 5.0 - 9.0 pH    Protein Albumin Urine 100 (A) NEG^Negative mg/dL    Urobilinogen Urine 2.0 (H) 0.2 - 1.0 EU/dL    Nitrite Urine Positive (A) NEG^Negative    Leukocyte Esterase Urine Negative NEG^Negative    Source Midstream Urine    Comprehensive metabolic panel     Status: Abnormal   Result Value Ref Range    Sodium 135 134 - 144 mmol/L    Potassium 3.8 3.5 - 5.1 mmol/L    Chloride 97 (L) 98 - 107 mmol/L    Carbon Dioxide 29 21 - 31 mmol/L    Anion Gap 9 3 - 14 mmol/L    Glucose 146 (H) 70 - 105 mg/dL    Urea Nitrogen 29 (H) 7 - 25 mg/dL    Creatinine 1.53 (H) 0.70 - 1.30 mg/dL    GFR Estimate 45 (L) >60 mL/min/[1.73_m2]    GFR Estimate If Black 54 (L) >60  mL/min/[1.73_m2]    Calcium 9.0 8.6 - 10.3 mg/dL    Bilirubin Total 3.7 (H) 0.3 - 1.0 mg/dL    Albumin 4.0 3.5 - 5.7 g/dL    Protein Total 7.4 6.4 - 8.9 g/dL    Alkaline Phosphatase 73 34 - 104 U/L    ALT 12 7 - 52 U/L    AST 21 13 - 39 U/L   Urine Microscopic     Status: Abnormal   Result Value Ref Range    WBC Urine 0 - 5 OTO5^0 - 5 /HPF    RBC Urine O - 2 OTO2^O - 2 /HPF    Amorphous Crystals Few (A) NEG^Negative /HPF   Lactic acid     Status: Abnormal   Result Value Ref Range    Lactic Acid 4.5 (HH) 0.5 - 2.2 mmol/L   Blood culture     Status: None   Result Value Ref Range    Specimen Description Blood     Culture Micro No growth after 6 days    Blood culture     Status: None   Result Value Ref Range    Specimen Description Blood     Culture Micro No growth after 6 days               No results found for this or any previous visit (from the past 24 hour(s)).    Medications - No data to display    Assessments & Plan (with Medical Decision Making)     I have reviewed the nursing notes.    I have reviewed the findings, diagnosis, plan and need for follow up with the patient.      New Prescriptions    No medications on file       Final diagnoses:   Retropharyngeal abscess   Elevated INR       11/26/2019   [facility] Bhumi Espinal MD  12/02/19 [id number]

## 2019-11-26 NOTE — PHARMACY-ADMISSION MEDICATION HISTORY
Pharmacy -- Admission Medication Reconciliation to [facility]'s ED pharmacist per her phonecall    Prior to admission (PTA) medications were reviewed and the patient's PTA medication list was updated.    Sources Consulted: anticoagulation note, admission medication list, sure script    The following updated medication list was reviewed with ED Pharmacist, Gabriela Dyson [facility], 11/26/2019,  5:35 PM

## 2019-11-26 NOTE — ED TRIAGE NOTES
Pt to ER with wife.  [facility] with sore throat and left ear pain.  Strep negative.  Overnight throat is visibly swollen, pain increasing, throat feeling like its closing.  Had abscessed tooth last year, wife states his neck looked similar to what it does today.  Hx pulmonary HTN, on coumadin. Decreased appetite, no nausea.  Left side of neck tender and pain continues to left ear.

## 2019-11-27 LAB — EJECTION FRACTION: >55 %

## 2019-12-02 LAB
BACTERIA SPEC CULT: NORMAL
BACTERIA SPEC CULT: NORMAL
SPECIMEN SOURCE: NORMAL
SPECIMEN SOURCE: NORMAL

## 2019-12-06 PROCEDURE — G0180 MD CERTIFICATION HHA PATIENT: HCPCS | Performed by: FAMILY MEDICINE

## 2019-12-09 DIAGNOSIS — I48.20 CHRONIC ATRIAL FIBRILLATION (H): ICD-10-CM

## 2019-12-09 NOTE — PATIENT INSTRUCTIONS
Patient Education   Personalized Prevention Plan  You are due for the preventive services outlined below.  Your care team is available to assist you in scheduling these services.  If you have already completed any of these items, please share that information with your care team to update in your medical record.  Health Maintenance Due   Topic Date Due     Discuss Advance Care Planning  1945     Zoster (Shingles) Vaccine (1 of 2) 09/22/1994     Annual Wellness Visit  09/22/2009     FALL RISK ASSESSMENT  12/05/2019     Colonoscopy  11/18/2019

## 2019-12-09 NOTE — PROGRESS NOTES
"  SUBJECTIVE:   Angel Garrett is a 74 year old male who presents for Preventive Visit.      Are you in the first 12 months of your Medicare Part B coverage?  No    Physical Health:    In general, how would you rate your overall physical health? fair    Outside of work, how many days during the week do you exercise? 1 day/week    Outside of work, approximately how many minutes a day do you exercise?15-30 minutes    If you drink alcohol do you typically have >3 drinks per day or >7 drinks per week? No    Do you usually eat at least 4 servings of fruit and vegetables a day, include whole grains & fiber and avoid regularly eating high fat or \"junk\" foods? Yes    Do you have any problems taking medications regularly?  No    Do you have any side effects from medications? none    Needs assistance for the following daily activities: transportation, preparing meals and housework    Which of the following safety concerns are present in your home?  none identified     Hearing impairment: No    In the past 6 months, have you been bothered by leaking of urine? He has a sanchez catheter in right now    Mental Health:    In general, how would you rate your overall mental or emotional health? good  PHQ-2 Score:      Do you feel safe in your environment? Yes    Have you ever done Advance Care Planning? (For example, a Health Directive, POLST, or a discussion with a medical provider or your loved ones about your wishes): No, advance care planning information given to patient to review.  Patient declined advance care planning discussion at this time.    Additional concerns to address?  No    Fall risk:  Fallen 2 or more times in the past year?: No  Any fall with injury in the past year?: No    Cognitive Screenin) Repeat 3 items (Leader, Season, Table)    2) Clock draw: ABNORMAL Did not put number on the clock but the time was correct  3) 3 item recall: Recalls 3 objects  Results: 3 items recalled: COGNITIVE IMPAIRMENT LESS " LIKELY    Mini-CogTM Copyright S Lita. Licensed by the author for use in [facility]; reprinted with permission (mrai@.[facility]). All rights reserved.      Do you have sleep apnea, excessive snoring or daytime drowsiness?: yes    Was in [facility].        See below    Reviewed and updated as needed this visit by clinical staff  Tobacco  Allergies  Meds  Med Hx  Surg Hx  Fam Hx  Soc Hx        Reviewed and updated as needed this visit by Provider        Social History     Tobacco Use     Smoking status: Former Smoker     Last attempt to quit: 1984     Years since quittin.2     Smokeless tobacco: Never Used   Substance Use Topics     Alcohol use: Yes     Alcohol/week: 5.8 standard drinks     Comment: Alcoholic Drinks/day: 1-2 drinks a day                           Current providers sharing in care for this patient include:   Patient Care Team:  Donte Mckeon MD as PCP - General (Family Practice)  Donte Mckeon MD as Assigned PCP    The following health maintenance items are reviewed in Epic and correct as of today:  Health Maintenance   Topic Date Due     ADVANCE CARE PLANNING  1945     ZOSTER IMMUNIZATION (1 of 2) 1994     FALL RISK ASSESSMENT  2019     COLONOSCOPY  2019     MEDICARE ANNUAL WELLNESS VISIT  12/10/2020     DTAP/TDAP/TD IMMUNIZATION (2 - Td) 2022     LIPID  2023     PHQ-2  Completed     INFLUENZA VACCINE  Completed     PNEUMOCOCCAL IMMUNIZATION 65+ LOW/MEDIUM RISK  Completed     AORTIC ANEURYSM SCREENING (SYSTEM ASSIGNED)  Completed     IPV IMMUNIZATION  Aged Out     MENINGITIS IMMUNIZATION  Aged Out     Lab work is in process  Pneumonia Vaccine:completed    ROS:  Constitutional, HEENT, cardiovascular, pulmonary, GI, , musculoskeletal, neuro, skin, endocrine and psych systems are negative, except as otherwise noted.    OBJECTIVE:   BP (!) 146/60   Pulse 85   Temp 98.3  F (36.8  C) (Temporal)   Resp 16   Ht 1.689 m (5'  "6.5\")   Wt 96.2 kg (212 lb)   SpO2 93%   BMI 33.71 kg/m    Estimated body mass index is 33.71 kg/m  as calculated from the following:    Height as of this encounter: 1.689 m (5' 6.5\").    Weight as of this encounter: 96.2 kg (212 lb).  EXAM:   See below    Diagnostic Test Results:  Labs reviewed in Epic    ASSESSMENT / PLAN:   Angel was seen today for medicare visit.    Diagnoses and all orders for this visit:    Encounter for Medicare annual wellness exam    Pulmonary hypertension (H)  -     CBC with platelets differential; Future  -     Comprehensive metabolic panel; Future  -     Comprehensive metabolic panel  -     CBC with platelets differential    Essential hypertension  -     Comprehensive metabolic panel; Future  -     Lipid Profile; Future  -     Lipid Profile  -     Comprehensive metabolic panel  -     metoprolol succinate ER (TOPROL-XL) 50 MG 24 hr tablet; Take 1 tablet (50 mg) by mouth daily    Chronic atrial fibrillation  -     CBC with platelets differential; Future  -     CBC with platelets differential  -     digoxin (LANOXIN) 250 MCG tablet; Take 1 tablet (250 mcg) by mouth daily  -     torsemide (DEMADEX) 20 MG tablet; Take 1 tablet (20 mg) by mouth 2 times daily  -     warfarin ANTICOAGULANT (COUMADIN) 3 MG tablet; Take 9 mg x one day/week (Saturdays), and 6 mg x six days/week.  Or as directed by the [facility] clinic.    Obstructive sleep apnea  -     CBC with platelets differential; Future  -     CBC with platelets differential    Prediabetes  -     Comprehensive metabolic panel; Future  -     Hemoglobin A1c; Future  -     Lipid Profile; Future  -     Lipid Profile  -     Hemoglobin A1c  -     Comprehensive metabolic panel    Prostate disorder  -     Prostate Specific Antigen GH; Future  -     Prostate Specific Antigen GH    Long term current use of anticoagulant therapy  -     warfarin ANTICOAGULANT (COUMADIN) 3 MG tablet; Take 9 mg x one day/week (Saturdays), and 6 mg x six days/week.  Or as " "directed by the [facility].    Urinary retention  -     UROLOGY ADULT REFERRAL    Abscess of neck        COUNSELING:  Reviewed preventive health counseling, as reflected in patient instructions       Regular exercise       Healthy diet/nutrition       Dental care       Colon cancer screening       Prostate cancer screening    Estimated body mass index is 33.71 kg/m  as calculated from the following:    Height as of this encounter: 1.689 m (5' 6.5\").    Weight as of this encounter: 96.2 kg (212 lb).    Weight management plan: Discussed healthy diet and exercise guidelines     reports that he quit smoking about 35 years ago. He has never used smokeless tobacco.      Appropriate preventive services were discussed with this patient, including applicable screening as appropriate for cardiovascular disease, diabetes, osteopenia/osteoporosis, and glaucoma.  As appropriate for age/gender, discussed screening for colorectal cancer, prostate cancer, breast cancer, and cervical cancer. Checklist reviewing preventive services available has been given to the patient.    Reviewed patients plan of care and provided an AVS. The Basic Care Plan (routine screening as documented in Health Maintenance) for Angel meets the Care Plan requirement. This Care Plan has been established and reviewed with the Patient and spouse.    Counseling Resources:  ATP IV Guidelines  Pooled Cohorts Equation Calculator  Breast Cancer Risk Calculator  FRAX Risk Assessment  ICSI Preventive Guidelines  Dietary Guidelines for Americans, 2010  [employer]'s MyPlate  ASA Prophylaxis  Lung CA Screening    Donte Mckeon MD  [facility]    Nursing Notes:   Cyndee Berry LPN  12/10/2019 10:38 AM  Signed  Chief Complaint   Patient presents with     Medicare Visit     annual visit       Initial BP (!) 146/60   Pulse 85   Temp 98.3  F (36.8  C) (Temporal)   Resp 16   Ht 1.689 m (5' 6.5\")   Wt 96.2 kg (212 lb)   SpO2 93%   BMI 33.71 " "kg/m    Estimated body mass index is 33.71 kg/m  as calculated from the following:    Height as of this encounter: 1.689 m (5' 6.5\").    Weight as of this encounter: 96.2 kg (212 lb).  Medication Reconciliation: yassine Berry LPN    SUBJECTIVE:  Angel Garrett  is a 74 year old male who in addition to his Medicare annual wellness is due for follow-up of his multiple medical problems including hypertension, hyperlipidemia, chronic anticoagulation on warfarin for chronic atrial fibrillation, prediabetes, pulmonary hypertension, and obstructive sleep apnea.  He recently saw Dr. Salcedo for a CPAP follow-up and compliance visit in early October.  He wears it regularly and finds it is very helpful.  I last saw him a year ago and we checked his labs and renewed his medications.  We ordered AAA screening which he did not follow through with.    He follows with cardiology at [facility] and was seen in August of this year.  He had surgical repair of a sinus venosus ASD in 2002.  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  He has severe pulmonary hypertension for which he takes Adcirca.  He had echocardiogram that showed improvement in his RVSP without decreased RV systolic function and some improvement in his BNP.  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  He was to continue metoprolol, torsemide,tadalafil, and digoxin as well as his chronic anticoagulation with warfarin with a target INR of 2-3.  He will continue to follow with them yearly.    He typically will self with her camper for the winter.  He is due for colonoscopy this year.  He has done FIT testing.  He is up-to-date on immunizations but has not had Shingrix.    He recently was seen for a retropharyngeal abscess for which he was sent to [facility] and had surgery done by Dr. Alberts. He is on " Flagyl and IV Rocephin 2 gm via PICC line.     He currently has a sanchez catheter in. He had urinary retention after anesthesia. He had to be cathed by urology.  He has the catheter pulled in 2 days.         Past Medical, Family, and Social History reviewed and updated as noted below.   ROS is negative except as noted above       Allergies   Allergen Reactions     Pollen Extract Other (See Comments)     Nasal congestion  Nasal congestion   ,   Family History   Problem Relation Age of Onset     Heart Disease Mother         Heart Disease     Heart Disease Father         Heart Disease,CHF     Other - See Comments Father         PUD     Other - See Comments Brother         Liver or kidney cancer     Family History Negative Sister         Good Health     Diabetes Brother         Diabetes     Family History Negative Brother         Good Health     Family History Negative Brother         Good Health     Family History Negative Brother         Good Health   ,   Current Outpatient Medications   Medication     acetaminophen (TYLENOL) 325 MG tablet     digoxin (LANOXIN) 250 MCG tablet     metoprolol succinate ER (TOPROL-XL) 50 MG 24 hr tablet     Tadalafil, PAH, (ADCIRCA PO)     torsemide (DEMADEX) 20 MG tablet     warfarin ANTICOAGULANT (COUMADIN) 3 MG tablet     amoxicillin-clavulanate (AUGMENTIN) 875-125 MG tablet     HYDROcodone-acetaminophen (NORCO) 5-325 MG tablet     metroNIDAZOLE (FLAGYL) 500 MG tablet     tamsulosin (FLOMAX) 0.4 MG capsule     No current facility-administered medications for this visit.    , History reviewed. No pertinent past medical history.,   Patient Active Problem List    Diagnosis Date Noted     Acute renal failure (H) 10/04/2018     Priority: Medium     Fall at home 05/10/2018     Priority: Medium     Long term current use of anticoagulant therapy 05/10/2018     Priority: Medium     Closed fracture of multiple ribs of right side, initial encounter 05/10/2018     Priority: Medium     Chronic  "atrial fibrillation 2018     Priority: Medium     Anticoagulation monitoring, INR range 2-3 2018     Priority: Medium     Dysphonia 2017     Priority: Medium     Peritonsillar abscess 05/15/2017     Priority: Medium     Pulmonary hypertension (H) 05/15/2017     Priority: Medium     Obstructive sleep apnea 2016     Priority: Medium     Arteriosclerosis of coronary artery 04/15/2016     Priority: Medium     Hx of congenital heart disease 2013     Priority: Medium     Hyperlipemia 2013     Priority: Medium     Hypertension 2013     Priority: Medium     Obesity 2013     Priority: Medium     Prediabetes 2013     Priority: Medium     Atrial septal defect 2003     Priority: Medium   ,   Past Surgical History:   Procedure Laterality Date     COLONOSCOPY  2009,Colonography     OTHER SURGICAL HISTORY      ,,OPEN CHOLECYSTECTOMY     OTHER SURGICAL HISTORY      ,96367,CARDIAC SURGERY,Pericardial patch, closure of ASD, repair of PFO and repair of anomalous pulmonary venous drainage.     OTHER SURGICAL HISTORY      ,[id number],CARDIAC CATHETERIZATION     Right total hip arthroplasty Right 10/2018    Demario Green MD,  [facility]     VASECTOMY      No Comments Provided    and   Social History     Tobacco Use     Smoking status: Former Smoker     Last attempt to quit: 1984     Years since quittin.2     Smokeless tobacco: Never Used   Substance Use Topics     Alcohol use: Yes     Alcohol/week: 5.8 standard drinks     Comment: Alcoholic Drinks/day: 1-2 drinks a day     OBJECTIVE:  BP (!) 146/60   Pulse 85   Temp 98.3  F (36.8  C) (Temporal)   Resp 16   Ht 1.689 m (5' 6.5\")   Wt 96.2 kg (212 lb)   SpO2 93%   BMI 33.71 kg/m      EXAM:  General Appearance: Pleasant, alert, appropriate appearance for age. No acute distress  Head Exam: Normal. Normocephalic, atraumatic.  Eye Exam:  Normal external eyes, conjunctivae, lids, " cornea. PRICE. EOMI  Ear Exam: Normal TM's bilaterally. Normal auditory canals and external ears. Non-tender.  Nose Exam: Normal external nose, mucus membranes, and septum.  OroPharynx Exam:  Dental hygiene adequate. Normal buccal mucosa. Normal pharynx.  Neck Exam:  Supple, no masses or nodes. No audible bruits.  Dressing in place and is seeing Dr. Alberts in a few minutes  Thyroid Exam: No nodules or enlargement.  Chest/Respiratory Exam: Normal chest wall and respirations. Clear to auscultation.  Cardiovascular Exam: Regular rate and rhythm. S1, S2, no murmur, click, gallop, or rubs.  Gastrointestinal Exam: Soft, non-tender, no masses or organomegaly.  Genitourinary Exam Male: Indwelling catheter  Lymphatic Exam: Non-palpable nodes in neck, clavicular regions.  Musculoskeletal Exam: Back is straight and non-tender, full ROM of upper and lower extremities.  Foot Exam: Left and right foot: good pedal pulses  Skin: no rash or abnormalities  Neurologic Exam: Nonfocal, normal gross motor, tone coordination and no tremor.  Psychiatric Exam: Alert and oriented - appropriate affect.     Results for orders placed or performed in visit on 12/10/19   INR point of care     Status: Abnormal   Result Value Ref Range    INR Protime 2.6 (A) 0.86 - 1.14   Results for orders placed or performed in visit on 12/10/19   Lipid Profile     Status: None   Result Value Ref Range    Cholesterol 89 <200 mg/dL    Triglycerides 69 <150 mg/dL    HDL Cholesterol 40 23 - 92 mg/dL    LDL Cholesterol Calculated 35 <100 mg/dL    Non HDL Cholesterol 49 <130 mg/dL   Prostate Specific Antigen GH     Status: Abnormal   Result Value Ref Range    Prostate Specific Antigen 8.449 (H) <3.100 ng/mL   Hemoglobin A1c     Status: Abnormal   Result Value Ref Range    Hemoglobin A1C 6.1 (H) 4.0 - 6.0 %   Comprehensive metabolic panel     Status: Abnormal   Result Value Ref Range    Sodium 135 134 - 144 mmol/L    Potassium 3.8 3.5 - 5.1 mmol/L    Chloride 93 (L) 98  - 107 mmol/L    Carbon Dioxide 34 (H) 21 - 31 mmol/L    Anion Gap 8 3 - 14 mmol/L    Glucose 139 (H) 70 - 105 mg/dL    Urea Nitrogen 16 7 - 25 mg/dL    Creatinine 1.27 0.70 - 1.30 mg/dL    GFR Estimate 55 (L) >60 mL/min/[1.73_m2]    GFR Estimate If Black 67 >60 mL/min/[1.73_m2]    Calcium 8.6 8.6 - 10.3 mg/dL    Bilirubin Total 0.8 0.3 - 1.0 mg/dL    Albumin 3.3 (L) 3.5 - 5.7 g/dL    Protein Total 6.7 6.4 - 8.9 g/dL    Alkaline Phosphatase 58 34 - 104 U/L    ALT 12 7 - 52 U/L    AST 28 13 - 39 U/L   CBC with platelets differential     Status: Abnormal   Result Value Ref Range    WBC 7.5 4.0 - 11.0 10e9/L    RBC Count 3.35 (L) 4.4 - 5.9 10e12/L    Hemoglobin 12.0 (L) 13.3 - 17.7 g/dL    Hematocrit 36.0 (L) 40.0 - 53.0 %     (H) 78 - 100 fl    MCH 35.8 (H) 26.5 - 33.0 pg    MCHC 33.3 31.5 - 36.5 g/dL    RDW 13.2 10.0 - 15.0 %    Platelet Count 324 150 - 450 10e9/L    Diff Method Automated Method     % Neutrophils 80.3 %    % Lymphocytes 11.4 %    % Monocytes 6.3 %    % Eosinophils 1.2 %    % Basophils 0.4 %    % Immature Granulocytes 0.4 %    Absolute Neutrophil 6.0 1.6 - 8.3 10e9/L    Absolute Lymphocytes 0.9 0.8 - 5.3 10e9/L    Absolute Monocytes 0.5 0.0 - 1.3 10e9/L    Absolute Eosinophils 0.1 0.0 - 0.7 10e9/L    Absolute Basophils 0.0 0.0 - 0.2 10e9/L    Abs Immature Granulocytes 0.0 0 - 0.4 10e9/L      ASSESSMENT/Plan :    Angel was seen today for medicare visit.    Diagnoses and all orders for this visit:    Encounter for Medicare annual wellness exam    Pulmonary hypertension (H)  -     CBC with platelets differential; Future  -     Comprehensive metabolic panel; Future  -     Comprehensive metabolic panel  -     CBC with platelets differential    Essential hypertension  -     Comprehensive metabolic panel; Future  -     Lipid Profile; Future  -     Lipid Profile  -     Comprehensive metabolic panel  -     metoprolol succinate ER (TOPROL-XL) 50 MG 24 hr tablet; Take 1 tablet (50 mg) by mouth  daily    Chronic atrial fibrillation  -     CBC with platelets differential; Future  -     CBC with platelets differential  -     digoxin (LANOXIN) 250 MCG tablet; Take 1 tablet (250 mcg) by mouth daily  -     torsemide (DEMADEX) 20 MG tablet; Take 1 tablet (20 mg) by mouth 2 times daily  -     warfarin ANTICOAGULANT (COUMADIN) 3 MG tablet; Take 9 mg x one day/week (Saturdays), and 6 mg x six days/week.  Or as directed by the [facility] clinic.    Obstructive sleep apnea  -     CBC with platelets differential; Future  -     CBC with platelets differential    Prediabetes  -     Comprehensive metabolic panel; Future  -     Hemoglobin A1c; Future  -     Lipid Profile; Future  -     Lipid Profile  -     Hemoglobin A1c  -     Comprehensive metabolic panel    Prostate disorder  -     Prostate Specific Antigen GH; Future  -     Prostate Specific Antigen GH    Long term current use of anticoagulant therapy  -     warfarin ANTICOAGULANT (COUMADIN) 3 MG tablet; Take 9 mg x one day/week (Saturdays), and 6 mg x six days/week.  Or as directed by the [facility] clinic.    Urinary retention  -     UROLOGY ADULT REFERRAL    Abscess of neck      He will see Dr. Alberts in a few minutes. Will notify of lab results when available. Discussed diet, exercise and healthy lifestyle changes. Continue current medications.     He has an elevated PSA most likely due to the trauma of multiple attempts at catheterization and indwelling [place].  He does not want to go down to [place] for void trial etc. so prefers to see Dr. Sen here and referral sent for same.  Will ask Dr. Sen to address the elevated PSA and recommendations for follow-up on that.    He will continue to follow-up with [facility] as scheduled.    A total of 25 minutes was spent with the patient, greater than 50% of the time was spent in counseling/discussion of the aforementioned concerns in addition to his Medicare annual wellness visit.      Donte Mckeon MD

## 2019-12-10 ENCOUNTER — OFFICE VISIT (OUTPATIENT)
Dept: OTOLARYNGOLOGY | Facility: OTHER | Age: 74
End: 2019-12-10
Attending: OTOLARYNGOLOGY
Payer: MEDICARE

## 2019-12-10 ENCOUNTER — OFFICE VISIT (OUTPATIENT)
Dept: FAMILY MEDICINE | Facility: OTHER | Age: 74
End: 2019-12-10
Attending: FAMILY MEDICINE
Payer: MEDICARE

## 2019-12-10 ENCOUNTER — ANTICOAGULATION THERAPY VISIT (OUTPATIENT)
Dept: ANTICOAGULATION | Facility: OTHER | Age: 74
End: 2019-12-10
Attending: FAMILY MEDICINE
Payer: COMMERCIAL

## 2019-12-10 VITALS
WEIGHT: 212 LBS | BODY MASS INDEX: 33.27 KG/M2 | OXYGEN SATURATION: 93 % | RESPIRATION RATE: 16 BRPM | HEIGHT: 67 IN | SYSTOLIC BLOOD PRESSURE: 146 MMHG | TEMPERATURE: 98.3 F | DIASTOLIC BLOOD PRESSURE: 60 MMHG | HEART RATE: 85 BPM

## 2019-12-10 DIAGNOSIS — G47.33 OBSTRUCTIVE SLEEP APNEA: ICD-10-CM

## 2019-12-10 DIAGNOSIS — Z00.00 ENCOUNTER FOR MEDICARE ANNUAL WELLNESS EXAM: Primary | ICD-10-CM

## 2019-12-10 DIAGNOSIS — I48.20 CHRONIC ATRIAL FIBRILLATION (H): ICD-10-CM

## 2019-12-10 DIAGNOSIS — R73.03 PREDIABETES: ICD-10-CM

## 2019-12-10 DIAGNOSIS — Z79.01 LONG TERM CURRENT USE OF ANTICOAGULANT THERAPY: ICD-10-CM

## 2019-12-10 DIAGNOSIS — I10 ESSENTIAL HYPERTENSION: ICD-10-CM

## 2019-12-10 DIAGNOSIS — J38.7 OTHER DISEASES OF LARYNX: Primary | ICD-10-CM

## 2019-12-10 DIAGNOSIS — R33.9 URINARY RETENTION: ICD-10-CM

## 2019-12-10 DIAGNOSIS — J39.0 RETROPHARYNGEAL ABSCESS: ICD-10-CM

## 2019-12-10 DIAGNOSIS — I27.20 PULMONARY HYPERTENSION (H): ICD-10-CM

## 2019-12-10 DIAGNOSIS — N42.9 PROSTATE DISORDER: ICD-10-CM

## 2019-12-10 DIAGNOSIS — L02.11 ABSCESS OF NECK: ICD-10-CM

## 2019-12-10 LAB
ALBUMIN SERPL-MCNC: 3.3 G/DL (ref 3.5–5.7)
ALP SERPL-CCNC: 58 U/L (ref 34–104)
ALT SERPL W P-5'-P-CCNC: 12 U/L (ref 7–52)
ANION GAP SERPL CALCULATED.3IONS-SCNC: 8 MMOL/L (ref 3–14)
AST SERPL W P-5'-P-CCNC: 28 U/L (ref 13–39)
BASOPHILS # BLD AUTO: 0 10E9/L (ref 0–0.2)
BASOPHILS NFR BLD AUTO: 0.4 %
BILIRUB SERPL-MCNC: 0.8 MG/DL (ref 0.3–1)
BUN SERPL-MCNC: 16 MG/DL (ref 7–25)
CALCIUM SERPL-MCNC: 8.6 MG/DL (ref 8.6–10.3)
CHLORIDE SERPL-SCNC: 93 MMOL/L (ref 98–107)
CHOLEST SERPL-MCNC: 89 MG/DL
CO2 SERPL-SCNC: 34 MMOL/L (ref 21–31)
CREAT SERPL-MCNC: 1.27 MG/DL (ref 0.7–1.3)
DIFFERENTIAL METHOD BLD: ABNORMAL
EOSINOPHIL # BLD AUTO: 0.1 10E9/L (ref 0–0.7)
EOSINOPHIL NFR BLD AUTO: 1.2 %
ERYTHROCYTE [DISTWIDTH] IN BLOOD BY AUTOMATED COUNT: 13.2 % (ref 10–15)
GFR SERPL CREATININE-BSD FRML MDRD: 55 ML/MIN/{1.73_M2}
GLUCOSE SERPL-MCNC: 139 MG/DL (ref 70–105)
HBA1C MFR BLD: 6.1 % (ref 4–6)
HCT VFR BLD AUTO: 36 % (ref 40–53)
HDLC SERPL-MCNC: 40 MG/DL (ref 23–92)
HGB BLD-MCNC: 12 G/DL (ref 13.3–17.7)
IMM GRANULOCYTES # BLD: 0 10E9/L (ref 0–0.4)
IMM GRANULOCYTES NFR BLD: 0.4 %
INR POINT OF CARE: 2.6 (ref 0.86–1.14)
LDLC SERPL CALC-MCNC: 35 MG/DL
LYMPHOCYTES # BLD AUTO: 0.9 10E9/L (ref 0.8–5.3)
LYMPHOCYTES NFR BLD AUTO: 11.4 %
MCH RBC QN AUTO: 35.8 PG (ref 26.5–33)
MCHC RBC AUTO-ENTMCNC: 33.3 G/DL (ref 31.5–36.5)
MCV RBC AUTO: 108 FL (ref 78–100)
MONOCYTES # BLD AUTO: 0.5 10E9/L (ref 0–1.3)
MONOCYTES NFR BLD AUTO: 6.3 %
NEUTROPHILS # BLD AUTO: 6 10E9/L (ref 1.6–8.3)
NEUTROPHILS NFR BLD AUTO: 80.3 %
NONHDLC SERPL-MCNC: 49 MG/DL
PLATELET # BLD AUTO: 324 10E9/L (ref 150–450)
POTASSIUM SERPL-SCNC: 3.8 MMOL/L (ref 3.5–5.1)
PROT SERPL-MCNC: 6.7 G/DL (ref 6.4–8.9)
PSA SERPL-MCNC: 8.45 NG/ML
RBC # BLD AUTO: 3.35 10E12/L (ref 4.4–5.9)
SODIUM SERPL-SCNC: 135 MMOL/L (ref 134–144)
TRIGL SERPL-MCNC: 69 MG/DL
WBC # BLD AUTO: 7.5 10E9/L (ref 4–11)

## 2019-12-10 PROCEDURE — G0463 HOSPITAL OUTPT CLINIC VISIT: HCPCS

## 2019-12-10 PROCEDURE — G0439 PPPS, SUBSEQ VISIT: HCPCS | Performed by: FAMILY MEDICINE

## 2019-12-10 PROCEDURE — 85025 COMPLETE CBC W/AUTO DIFF WBC: CPT | Mod: ZL | Performed by: FAMILY MEDICINE

## 2019-12-10 PROCEDURE — 85610 PROTHROMBIN TIME: CPT | Mod: QW,ZL

## 2019-12-10 PROCEDURE — 99214 OFFICE O/P EST MOD 30 MIN: CPT | Mod: 25 | Performed by: FAMILY MEDICINE

## 2019-12-10 PROCEDURE — 80061 LIPID PANEL: CPT | Mod: ZL | Performed by: FAMILY MEDICINE

## 2019-12-10 PROCEDURE — 80053 COMPREHEN METABOLIC PANEL: CPT | Mod: ZL | Performed by: FAMILY MEDICINE

## 2019-12-10 PROCEDURE — 84153 ASSAY OF PSA TOTAL: CPT | Mod: ZL | Performed by: FAMILY MEDICINE

## 2019-12-10 PROCEDURE — 83036 HEMOGLOBIN GLYCOSYLATED A1C: CPT | Mod: ZL | Performed by: FAMILY MEDICINE

## 2019-12-10 RX ORDER — METOPROLOL SUCCINATE 50 MG/1
50 TABLET, EXTENDED RELEASE ORAL DAILY
Qty: 90 TABLET | Status: SHIPPED | OUTPATIENT
Start: 2019-12-10 | End: 2020-12-14

## 2019-12-10 RX ORDER — DIGOXIN 250 MCG
250 TABLET ORAL DAILY
Qty: 30 TABLET | Refills: 11 | Status: SHIPPED | OUTPATIENT
Start: 2019-12-10 | End: 2020-12-14

## 2019-12-10 RX ORDER — HYDROCODONE BITARTRATE AND ACETAMINOPHEN 5; 325 MG/1; MG/1
TABLET ORAL
Refills: 0 | COMMUNITY
Start: 2019-12-05 | End: 2020-01-07

## 2019-12-10 RX ORDER — METRONIDAZOLE 500 MG/1
TABLET ORAL
Refills: 0 | COMMUNITY
Start: 2019-12-05 | End: 2020-01-07

## 2019-12-10 RX ORDER — TAMSULOSIN HYDROCHLORIDE 0.4 MG/1
CAPSULE ORAL
Refills: 0 | COMMUNITY
Start: 2019-12-05 | End: 2020-01-07

## 2019-12-10 RX ORDER — WARFARIN SODIUM 3 MG/1
TABLET ORAL
Qty: 200 TABLET | Refills: 1 | Status: SHIPPED | OUTPATIENT
Start: 2019-12-10 | End: 2020-02-12

## 2019-12-10 RX ORDER — TORSEMIDE 20 MG/1
20 TABLET ORAL 2 TIMES DAILY
Qty: 180 TABLET | Refills: 11 | Status: SHIPPED | OUTPATIENT
Start: 2019-12-10 | End: 2020-03-16

## 2019-12-10 ASSESSMENT — MIFFLIN-ST. JEOR: SCORE: 1647.32

## 2019-12-10 ASSESSMENT — PAIN SCALES - GENERAL: PAINLEVEL: NO PAIN (1)

## 2019-12-10 NOTE — NURSING NOTE
"Chief Complaint   Patient presents with     Medicare Visit     annual visit       Initial BP (!) 146/60   Pulse 85   Temp 98.3  F (36.8  C) (Temporal)   Resp 16   Ht 1.689 m (5' 6.5\")   Wt 96.2 kg (212 lb)   SpO2 93%   BMI 33.71 kg/m   Estimated body mass index is 33.71 kg/m  as calculated from the following:    Height as of this encounter: 1.689 m (5' 6.5\").    Weight as of this encounter: 96.2 kg (212 lb).  Medication Reconciliation: complete    Cyndee Berry LPN  "

## 2019-12-10 NOTE — PROGRESS NOTES
ANTICOAGULATION FOLLOW-UP CLINIC VISIT    Patient Name:  Angel Garrett  Date:  12/10/2019  Contact Type:  Face to Face    SUBJECTIVE:  Patient Findings     Positives:   Missed doses, Change in medications (finishing with Flagyl today started on flomax), Hospital admission (d/c from [facility])             OBJECTIVE    INR Protime   Date Value Ref Range Status   12/10/2019 2.6 (A) 0.86 - 1.14 Final       ASSESSMENT / PLAN  INR assessment THER    Recheck INR In: 1 WEEK    INR Location Clinic      Anticoagulation Summary  As of 12/10/2019    INR goal:   2.0-3.0   TTR:   86.2 % (7.7 mo)   INR used for dosin.6 (12/10/2019)   Warfarin maintenance plan:   9 mg (3 mg x 3) every Sat; 6 mg (3 mg x 2) all other days   Full warfarin instructions:   : 3 mg; Otherwise 9 mg every Sat; 6 mg all other days   Weekly warfarin total:   45 mg   Next INR check:   2019   Priority:   High   Target end date:   Indefinite    Indications    Chronic atrial fibrillation [I48.20]             Anticoagulation Episode Summary     INR check location:   Anticoagulation Clinic    Preferred lab:   [facility]    Send INR reminders to:    INR    Comments:   [place] leaving mid Dec. 2019, returning in spring.      Anticoagulation Care Providers     Provider Role Specialty Phone number    Donte Mckeon MD [facility] [phone]            See the Encounter Report to view Anticoagulation Flowsheet and Dosing Calendar (Go to Encounters tab in chart review, and find the Anticoagulation Therapy Visit)        Saba Honeycutt RN

## 2019-12-11 RX ORDER — DIGOXIN 250 MCG
250 TABLET ORAL DAILY
Qty: 90 TABLET | Refills: 3 | OUTPATIENT
Start: 2019-12-11

## 2019-12-11 NOTE — TELEPHONE ENCOUNTER
Disp Refills Start End AYSHA   digoxin (LANOXIN) 250 MCG tablet 30 tablet 11 12/10/2019  No   Sig - Route: Take 1 tablet (250 mcg) by mouth daily - Oral     Denying refill request as it was refilled yesterday during office visit.     Unable to complete prescription refill per RN Medication Refill Policy.................... Rhonda Larry RN ....................  12/11/2019   3:31 PM

## 2019-12-18 DIAGNOSIS — Z79.01 LONG TERM CURRENT USE OF ANTICOAGULANT THERAPY: ICD-10-CM

## 2019-12-18 DIAGNOSIS — J39.0 RETROPHARYNGEAL AND PARAPHARYNGEAL ABSCESS: Primary | ICD-10-CM

## 2019-12-18 DIAGNOSIS — I27.20 PULMONARY HYPERTENSION (H): ICD-10-CM

## 2019-12-18 DIAGNOSIS — I48.20 CHRONIC ATRIAL FIBRILLATION (H): ICD-10-CM

## 2019-12-18 DIAGNOSIS — I10 ESSENTIAL HYPERTENSION: ICD-10-CM

## 2019-12-18 NOTE — PROGRESS NOTES
Donte Mckeon MD reviewed and completed the following home care or hospice form(s) for Angel Garrett  on December 18, 2019 .  This covers the certification period effective 12/06/2019 through 02/03/2020.    [facility]       Susan Oliva LPN on 12/18/2019 at 11:44 AM

## 2019-12-19 ENCOUNTER — ANTICOAGULATION THERAPY VISIT (OUTPATIENT)
Dept: ANTICOAGULATION | Facility: OTHER | Age: 74
End: 2019-12-19
Attending: FAMILY MEDICINE
Payer: MEDICARE

## 2019-12-19 DIAGNOSIS — I48.20 CHRONIC ATRIAL FIBRILLATION (H): ICD-10-CM

## 2019-12-19 LAB — INR POINT OF CARE: 2.3 (ref 0.86–1.14)

## 2019-12-19 PROCEDURE — 85610 PROTHROMBIN TIME: CPT | Mod: QW,ZL

## 2019-12-19 NOTE — PROGRESS NOTES
ANTICOAGULATION FOLLOW-UP CLINIC VISIT    Patient Name:  Angel Garrett  Date:  2019  Contact Type:  Face to Face    SUBJECTIVE:  Patient Findings     Positives:   Change in medications (one more day of antibiotic)        Clinical Outcomes     Negatives:   Major bleeding event, Thromboembolic event, Anticoagulation-related hospital admission, Anticoagulation-related ED visit, Anticoagulation-related fatality           OBJECTIVE    INR Protime   Date Value Ref Range Status   2019 2.3 (A) 0.86 - 1.14 Final       ASSESSMENT / PLAN  INR assessment THER    Recheck INR In: 2 WEEKS    INR Location Clinic      Anticoagulation Summary  As of 2019    INR goal:   2.0-3.0   TTR:   86.2 % (7.7 mo)   INR used for dosin.3 (2019)   Warfarin maintenance plan:   9 mg (3 mg x 3) every Sat; 6 mg (3 mg x 2) all other days   Full warfarin instructions:   9 mg every Sat; 6 mg all other days   Weekly warfarin total:   45 mg   No change documented:   Izabella Verma RN   Next INR check:   2020   Priority:   High   Target end date:   Indefinite    Indications    Chronic atrial fibrillation [I48.20]             Anticoagulation Episode Summary     INR check location:   Anticoagulation Clinic    Preferred lab:   [facility]    Send INR reminders to:    INR    Comments:   [facility] leaving mid Dec. 2019, returning in spring.      Anticoagulation Care Providers     Provider Role Specialty Phone number    Donte Mckeon MD [facility] [phone]            See the Encounter Report to view Anticoagulation Flowsheet and Dosing Calendar (Go to Encounters tab in chart review, and find the Anticoagulation Therapy Visit)        Izabella Verma RN

## 2019-12-19 NOTE — PROGRESS NOTES
document embedded image  Patient Name: Angel Garrett    Address: [address] [place] [address]     YOB: 1945    [place] [id number]    MR Number: [id number]    Phone: [phone]  PCP: Donte Mckeon MD            Appointment Date: 12/10/19   Visit Provider: Robb Alberts MD    cc: ~    ENT Progress Note    Visit Reasons: Follow Up Throat Abcess    HPI  History of Present Illness  Chief complaint:  Deep neck infection    History  Mr. Garrett is a 74-year-old man who was recently eyes in [place] with a retropharyngeal and paralaryngeal abscess.  He required open drainage.  He was intubated for couple of days.  He was hospitalized for approximately a week and discharge when his drains extruded.  He had some problems with urinary retention and had a catheter placed before discharge.  He was encouraged to follow-up with the urologist here in [place] regarding this.  He has been pursuing regular wound cares at home.  He has had no recurrence progression of symptoms.    Exam  His neck wound is healing beautifully.  There is no cervical tenderness or swelling.  His PICC line was removed as per his infectious disease doctors request.    Home Medications     - Last Reconciled 12/11/19 by Jade Carson, MATTHEW    acetaminophen 325 mg tablet  650 mg PO Q4H PRN   amoxicillin 875 mg-potassium clavulanate 125 mg tablet  (Augmentin)  1 tab PO BID   amoxicillin 875 mg-potassium clavulanate 125 mg tablet   1 tab PO BID 10 days   ceftriaxone 2 gram intravenous solution  2 grams IV Push Q24H   digoxin 250 mcg (0.25 mg) tablet  250 mcg PO DAILY   hydrocodone 5 mg-acetaminophen 325 mg tablet   1 - 2 ea (1 - 2 x 5-325 mg) PO Q4H PRN   metoprolol succinate 50 mg tablet,extended release 24 hr  50 mg PO DAILY   metronidazole 500 mg tablet  500 mg PO Q8H   tadalafil (pulm. hypertension) 20 mg tablet (pulmonary hypertension)  20 mg PO BID   tamsulosin 0.4 mg capsule  0.4 mg PO DAILY   torsemide 20 mg tablet  40 mg (2 x 20 mg) PO  DAILY   warfarin 3 mg tablet  Take as directed by anticoagulation clinic     Allergies    No Known Allergies Allergy (Unverified 12/11/19 10:21)    PFSH    PFSH:     Medical History (Reviewed 12/11/19 @ 10:21 by Jade Carson CMA)    Atrial fibrillation (Chronic)  Heart trouble (Acute)  Hypertension (Chronic)    Surgical History (Reviewed 12/11/19 @ 10:22 by Jade Carson CMA)    H/O closure of congenital atrial septal defect by percutaneous transcatheter technique (Acute)  History of heart surgery (Acute)  Hx of cholecystectomy (Acute)    Family History Notes: Not able to be obtained due to intubation     Family History (Reviewed 12/11/19 @ 10:23 by Jade Carson CMA)  Father  Hypertension  Other  Depression  Heart trouble        Social History (Updated 12/11/19 @ 10:23 by Jade Carson CMA)  Smoking Status:  Former smoker   second hand exposure:  No   marital status:     Additional Social History:  Lives in [place], [place]           A&P  Assessment & Plan  (1) Abscess of larynx:         Status: Acute        Code(s):  J38.7 - Other diseases of larynx        We'll maintain him on another 10 days worth of Augmentin.  He'll continue with his wound cares.  He'll check in with me in 2 weeks for inspection of his wound.  It should be nearly completely healed by then.  (2) Retropharyngeal abscess:         Status: Acute        Code(s):  J39.0 - Retropharyngeal and parapharyngeal abscess  Departure  Other Medications:        New:    amoxicillin-pot clavulanate 875-125 mg 1 tab PO BID 10 days 20 tabs 0RF            Robb Alberts MD              12/10/19 6816   <Electronically signed by Robb Alberts MD> 12/17/19 9272

## 2020-01-02 ENCOUNTER — MEDICAL CORRESPONDENCE (OUTPATIENT)
Dept: HEALTH INFORMATION MANAGEMENT | Facility: OTHER | Age: 75
End: 2020-01-02

## 2020-01-07 ENCOUNTER — OFFICE VISIT (OUTPATIENT)
Dept: OTOLARYNGOLOGY | Facility: OTHER | Age: 75
End: 2020-01-07
Attending: OTOLARYNGOLOGY
Payer: MEDICARE

## 2020-01-07 ENCOUNTER — OFFICE VISIT (OUTPATIENT)
Dept: FAMILY MEDICINE | Facility: OTHER | Age: 75
End: 2020-01-07
Attending: FAMILY MEDICINE
Payer: MEDICARE

## 2020-01-07 ENCOUNTER — ANTICOAGULATION THERAPY VISIT (OUTPATIENT)
Dept: ANTICOAGULATION | Facility: OTHER | Age: 75
End: 2020-01-07
Attending: FAMILY MEDICINE
Payer: MEDICARE

## 2020-01-07 VITALS
HEART RATE: 72 BPM | OXYGEN SATURATION: 93 % | TEMPERATURE: 97 F | BODY MASS INDEX: 32.31 KG/M2 | SYSTOLIC BLOOD PRESSURE: 140 MMHG | DIASTOLIC BLOOD PRESSURE: 72 MMHG | RESPIRATION RATE: 18 BRPM | WEIGHT: 203.2 LBS

## 2020-01-07 DIAGNOSIS — I48.20 CHRONIC ATRIAL FIBRILLATION (H): Primary | ICD-10-CM

## 2020-01-07 DIAGNOSIS — I27.20 PULMONARY HYPERTENSION (H): ICD-10-CM

## 2020-01-07 DIAGNOSIS — Z79.01 ANTICOAGULATION MONITORING, INR RANGE 2-3: ICD-10-CM

## 2020-01-07 DIAGNOSIS — I10 ESSENTIAL HYPERTENSION: ICD-10-CM

## 2020-01-07 DIAGNOSIS — Q21.10 ATRIAL SEPTAL DEFECT: ICD-10-CM

## 2020-01-07 DIAGNOSIS — R73.03 PREDIABETES: ICD-10-CM

## 2020-01-07 DIAGNOSIS — I48.20 CHRONIC ATRIAL FIBRILLATION (H): ICD-10-CM

## 2020-01-07 DIAGNOSIS — J38.7 OTHER DISEASES OF LARYNX: Primary | ICD-10-CM

## 2020-01-07 DIAGNOSIS — Z79.01 LONG TERM CURRENT USE OF ANTICOAGULANT THERAPY: ICD-10-CM

## 2020-01-07 DIAGNOSIS — I25.10 ARTERIOSCLEROSIS OF CORONARY ARTERY: ICD-10-CM

## 2020-01-07 LAB — INR POINT OF CARE: 2.1 (ref 0.86–1.14)

## 2020-01-07 PROCEDURE — G0463 HOSPITAL OUTPT CLINIC VISIT: HCPCS | Mod: 25,27

## 2020-01-07 PROCEDURE — 36416 COLLJ CAPILLARY BLOOD SPEC: CPT | Mod: ZL

## 2020-01-07 PROCEDURE — 99213 OFFICE O/P EST LOW 20 MIN: CPT | Performed by: FAMILY MEDICINE

## 2020-01-07 PROCEDURE — G0463 HOSPITAL OUTPT CLINIC VISIT: HCPCS | Mod: 25

## 2020-01-07 PROCEDURE — G0463 HOSPITAL OUTPT CLINIC VISIT: HCPCS

## 2020-01-07 ASSESSMENT — PAIN SCALES - GENERAL: PAINLEVEL: NO PAIN (0)

## 2020-01-07 NOTE — NURSING NOTE
"Chief Complaint   Patient presents with     Hospital F/U     Here for a hospital follow up he left on December 5th.     Has question if he still needs to be on Flomax.      Initial There were no vitals taken for this visit. Estimated body mass index is 33.71 kg/m  as calculated from the following:    Height as of 12/10/19: 1.689 m (5' 6.5\").    Weight as of 12/10/19: 96.2 kg (212 lb).    Medication Reconciliation: complete      Sandro Gagnon LPN  "

## 2020-01-07 NOTE — PROGRESS NOTES
ANTICOAGULATION FOLLOW-UP CLINIC VISIT    Patient Name:  Angel Garrett  Date:  2020  Contact Type:  Face to Face    SUBJECTIVE:  Patient Findings     Comments:   Leaving to [facility] for winter back in May. He has a traveler order with him        Clinical Outcomes     Negatives:   Major bleeding event, Thromboembolic event, Anticoagulation-related hospital admission, Anticoagulation-related ED visit, Anticoagulation-related fatality    Comments:   Leaving to [facility] for winter back in May. He has a traveler order with him           OBJECTIVE    INR Protime   Date Value Ref Range Status   2020 2.1 (A) 0.86 - 1.14 Final       ASSESSMENT / PLAN  INR assessment THER    Recheck INR In: 6 WEEKS    INR Location Clinic      Anticoagulation Summary  As of 2020    INR goal:   2.0-3.0   TTR:   87.2 % (8.3 mo)   INR used for dosin.1 (2020)   Warfarin maintenance plan:   9 mg (3 mg x 3) every Sat; 6 mg (3 mg x 2) all other days   Full warfarin instructions:   9 mg every Sat; 6 mg all other days   Weekly warfarin total:   45 mg   No change documented:   Saba Honeycutt RN   Next INR check:   3/3/2020   Priority:   High   Target end date:   Indefinite    Indications    Chronic atrial fibrillation [I48.20]             Anticoagulation Episode Summary     INR check location:   Anticoagulation Clinic    Preferred lab:   [facility]    Send INR reminders to:    INR    Comments:   [facility] leaving mid Dec. 2019, returning in spring.      Anticoagulation Care Providers     Provider Role Specialty Phone number    Donte Mckeon MD [facility] [phone]            See the Encounter Report to view Anticoagulation Flowsheet and Dosing Calendar (Go to Encounters tab in chart review, and find the Anticoagulation Therapy Visit)        Saba Honeycutt RN

## 2020-01-07 NOTE — PROGRESS NOTES
"Nursing Notes:   Sandro Gagnon LPN  1/7/2020  1:58 PM  Signed  Chief Complaint   Patient presents with     Hospital F/U     Here for a hospital follow up he left on December 5th.     Has question if he still needs to be on Flomax.      Initial There were no vitals taken for this visit. Estimated body mass index is 33.71 kg/m  as calculated from the following:    Height as of 12/10/19: 1.689 m (5' 6.5\").    Weight as of 12/10/19: 96.2 kg (212 lb).    Medication Reconciliation: complete      Sandro Gagnon LPN    SUBJECTIVE:  Angel Garrett  is a 74 year old male who comes in today for follow-up.  I last saw him about a month ago.   He was hospitalized for retropharyngeal abscess and was hospitalized at [facility] and had surgery by Dr. Alberts who he saw for follow-up today.  He received IV Flagyl and Rocephin through PICC line.  He had some urinary retention after anesthesia and then had the catheter removed and has been doing fine with that.  He had an elevated PSA probably due to multiple attempts at passage of his [place].  We referred him to Dr. Sen to address his elevated PSA but it does not appear that he has seen him.    He follows with [facility].  The history which I summarized in his last note is as follows: \"He follows with cardiology at [facility] and was seen in August of this year.  He had surgical repair of a sinus venosus ASD in 2002.  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  He has severe pulmonary hypertension for which he takes Adcirca.  He had echocardiogram that showed improvement in his RVSP without decreased RV systolic function and some improvement in his BNP.  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  He was to continue metoprolol, torsemide,tadalafil, and digoxin as well as his chronic anticoagulation with warfarin with a target INR of " "2-3.  He will continue to follow with them yearly.\"    He was wondering if he need the Flomax. He has nocturia x1. He is emptying his bladder.       Past Medical, Family, and Social History reviewed and updated as noted below.   ROS is negative except as noted above       Allergies   Allergen Reactions     Pollen Extract Other (See Comments)     Nasal congestion  Nasal congestion   ,   Family History   Problem Relation Age of Onset     Heart Disease Mother         Heart Disease     Heart Disease Father         Heart Disease,CHF     Other - See Comments Father         PUD     Other - See Comments Brother         Liver or kidney cancer     Family History Negative Sister         Good Health     Diabetes Brother         Diabetes     Family History Negative Brother         Good Health     Family History Negative Brother         Good Health     Family History Negative Brother         Good Health   ,   Current Outpatient Medications   Medication     acetaminophen (TYLENOL) 325 MG tablet     digoxin (LANOXIN) 250 MCG tablet     metoprolol succinate ER (TOPROL-XL) 50 MG 24 hr tablet     Tadalafil, PAH, (ADCIRCA PO)     torsemide (DEMADEX) 20 MG tablet     warfarin ANTICOAGULANT (COUMADIN) 3 MG tablet     No current facility-administered medications for this visit.    , History reviewed. No pertinent past medical history.,   Patient Active Problem List    Diagnosis Date Noted     Acute renal failure (H) 10/04/2018     Priority: Medium     Fall at home 05/10/2018     Priority: Medium     Long term current use of anticoagulant therapy 05/10/2018     Priority: Medium     Closed fracture of multiple ribs of right side, initial encounter 05/10/2018     Priority: Medium     Chronic atrial fibrillation 02/11/2018     Priority: Medium     Anticoagulation monitoring, INR range 2-3 02/11/2018     Priority: Medium     Dysphonia 05/22/2017     Priority: Medium     Peritonsillar abscess 05/15/2017     Priority: Medium     Pulmonary " hypertension (H) 05/15/2017     Priority: Medium     Obstructive sleep apnea 2016     Priority: Medium     Arteriosclerosis of coronary artery 04/15/2016     Priority: Medium     Hx of congenital heart disease 2013     Priority: Medium     Hyperlipemia 2013     Priority: Medium     Hypertension 2013     Priority: Medium     Obesity 2013     Priority: Medium     Prediabetes 2013     Priority: Medium     Atrial septal defect 2003     Priority: Medium   ,   Past Surgical History:   Procedure Laterality Date     COLONOSCOPY  2009,Colonography     OTHER SURGICAL HISTORY      ,,OPEN CHOLECYSTECTOMY     OTHER SURGICAL HISTORY      ,07074,CARDIAC SURGERY,Pericardial patch, closure of ASD, repair of PFO and repair of anomalous pulmonary venous drainage.     OTHER SURGICAL HISTORY      ,[id number],CARDIAC CATHETERIZATION     Right total hip arthroplasty Right 10/2018    Demario Green MD,  [facility]     VASECTOMY      No Comments Provided    and   Social History     Tobacco Use     Smoking status: Former Smoker     Last attempt to quit: 1984     Years since quittin.3     Smokeless tobacco: Never Used   Substance Use Topics     Alcohol use: Yes     Alcohol/week: 5.8 standard drinks     Comment: Alcoholic Drinks/day: 1-2 drinks a day     OBJECTIVE:  BP (!) 140/72   Pulse 72   Temp 97  F (36.1  C) (Tympanic)   Resp 18   Wt 92.2 kg (203 lb 3.2 oz)   SpO2 93%   BMI 32.31 kg/m     EXAM:  Alert cooperative, no distress.  Lungs are clear, no rales rhonchi or wheezes are heard.  Cardiac irregularly irregular without murmur.  Minimal pedal edema is noted.  ASSESSMENT/Plan :    Angel was seen today for hospital f/u.    Diagnoses and all orders for this visit:    Chronic atrial fibrillation    Pulmonary hypertension (H)    Essential hypertension    Atrial septal defect    Long term current use of anticoagulant therapy    Arteriosclerosis of  coronary artery    Anticoagulation monitoring, INR range 2-3    Prediabetes      Continue with his current medications.  We will follow-up after [place].  Okay to stay off Flomax for now but will take the prescription with him in case he begins to have more obstructive symptoms.    A total of 15 minutes was spent with the patient, greater than 50% of the time was spent in counseling/discussion of the aforementioned concerns.     Donte Mckeon MD

## 2020-01-14 NOTE — PROGRESS NOTES
document embedded image  Patient Name: Angel Garrett    Address: [address] [place] [address]     YOB: 1945    [place] [address]    MR Number: [id number]    Phone: [phone]  PCP: Donte Mckeon MD            Appointment Date: 01/07/20   Visit Provider: Robb Alberts MD    cc: ~    ENT Progress Note    Visit Reasons: Recheck Throat After Abcess    HPI  History of Present Illness  Chief complaint:  Follow-up paralaryngeal abscess    History  The patient is a 74-year-old man who was hospitalized last month at [facility] with paralaryngeal and retropharyngeal abscesses.  He did undergo surgical drainage.  He has healed without incident.  He did have a formal dental evaluation with x-rays that failed to reveal an odontogenic source for this infection.    Exam  His wound is well-healed.  He has no cervical tenderness or swelling.  Oral cavity oropharynx-free of lesions or inflammation  Nasal-no obstruction or purulence  Head and neck integument-otherwise clear  General-the patient appears well and in no distress  Neuro-there are no focal cranial nerve deficits    Allergies    No Known Allergies Allergy (Unverified 12/11/19 10:21)    PFSH    PFSH:     Medical History (Reviewed 12/11/19 @ 10:21 by Jade Carson CMA)    Atrial fibrillation (Chronic)  Heart trouble (Acute)  Hypertension (Chronic)    Family History Notes: Not able to be obtained due to intubation       Social History (Updated 12/11/19 @ 10:23 by Jade Carson CMA)  Smoking Status:  Former smoker   second hand exposure:  No   marital status:     Additional Social History:  Lives in [place], [place]           A&P  Assessment & Plan  (1) Abscess of larynx:         Status: Acute        Code(s):  J38.7 - Other diseases of larynx        He will follow up with me as needed at this time.      Robb Alberts MD              01/07/20 1301     <Electronically signed by Robb Alberts MD> 01/14/20 3775

## 2020-02-10 ENCOUNTER — TRANSFERRED RECORDS (OUTPATIENT)
Dept: HEALTH INFORMATION MANAGEMENT | Facility: OTHER | Age: 75
End: 2020-02-10

## 2020-02-10 LAB — INR PPP: 1.8 (ref 0.9–1.1)

## 2020-02-12 ENCOUNTER — ANTICOAGULATION THERAPY VISIT (OUTPATIENT)
Dept: ANTICOAGULATION | Facility: OTHER | Age: 75
End: 2020-02-12
Payer: COMMERCIAL

## 2020-02-12 DIAGNOSIS — I48.20 CHRONIC ATRIAL FIBRILLATION (H): ICD-10-CM

## 2020-02-12 DIAGNOSIS — Z79.01 LONG TERM CURRENT USE OF ANTICOAGULANT THERAPY: ICD-10-CM

## 2020-02-12 RX ORDER — WARFARIN SODIUM 3 MG/1
TABLET ORAL
Qty: 200 TABLET | Refills: 1 | COMMUNITY
Start: 2020-02-12 | End: 2020-06-10

## 2020-02-12 NOTE — PROGRESS NOTES
ANTICOAGULATION FOLLOW-UP CLINIC VISIT    Patient Name:  Angel Garrett  Date:  2020  Contact Type:  INR from outside lab/phone call with patijent    SUBJECTIVE:  Patient Findings     Positives:   Change in diet/appetite (eating more greens, wintering in [place])        Clinical Outcomes     Negatives:   Major bleeding event, Thromboembolic event, Anticoagulation-related hospital admission, Anticoagulation-related ED visit, Anticoagulation-related fatality           OBJECTIVE    INR   Date Value Ref Range Status   02/10/2020 1.8 (A) 0.90 - 1.10 Final       ASSESSMENT / PLAN  INR assessment SUB    Recheck INR In: 2 WEEKS    INR Location Outside lab      Anticoagulation Summary  As of 2020    INR goal:   2.0-3.0   TTR:   80.8 % (9.5 mo)   INR used for dosin.8! (2/10/2020)   Warfarin maintenance plan:   9 mg (3 mg x 3) every Wed, Sat; 6 mg (3 mg x 2) all other days   Full warfarin instructions:   : 9 mg; Otherwise 9 mg every Wed, Sat; 6 mg all other days   Weekly warfarin total:   48 mg   Plan last modified:   Izabella Verma RN (2020)   Next INR check:   2020   Priority:   High   Target end date:   Indefinite    Indications    Chronic atrial fibrillation [I48.20]             Anticoagulation Episode Summary     INR check location:   Anticoagulation Clinic    Preferred lab:   [facility]    Send INR reminders to:    INR    Comments:   [facility] leaving mid Dec. 2019, returning in spring.      Anticoagulation Care Providers     Provider Role Specialty Phone number    Donte Mckeon MD [facility] [phone]            See the Encounter Report to view Anticoagulation Flowsheet and Dosing Calendar (Go to Encounters tab in chart review, and find the Anticoagulation Therapy Visit)    No encounter, INR and assessment received via fax.  Telephone call with patient for assessment and instructions.  Patient verbalized understanding.        Izabella HERNANDEZ  PAIGE Verma

## 2020-02-21 ENCOUNTER — TRANSFERRED RECORDS (OUTPATIENT)
Dept: HEALTH INFORMATION MANAGEMENT | Facility: OTHER | Age: 75
End: 2020-02-21

## 2020-02-21 ENCOUNTER — ANTICOAGULATION THERAPY VISIT (OUTPATIENT)
Dept: ANTICOAGULATION | Facility: OTHER | Age: 75
End: 2020-02-21
Payer: COMMERCIAL

## 2020-02-21 DIAGNOSIS — I48.20 CHRONIC ATRIAL FIBRILLATION (H): ICD-10-CM

## 2020-02-21 LAB — INR PPP: 2.06 (ref 0.9–1.1)

## 2020-02-21 NOTE — PROGRESS NOTES
ANTICOAGULATION FOLLOW-UP CLINIC VISIT    Patient Name:  Angel Garrett  Date:  2020  Contact Type:  Telephone/ left message with direction on warfarin dosing and when to recheck INR    SUBJECTIVE:  Patient Findings         Clinical Outcomes     Negatives:   Major bleeding event, Thromboembolic event, Anticoagulation-related hospital admission, Anticoagulation-related ED visit, Anticoagulation-related fatality           OBJECTIVE    INR   Date Value Ref Range Status   2020 2.06 (A) 0.90 - 1.10 Final       ASSESSMENT / PLAN  INR assessment THER    Recheck INR In: 2 WEEKS    INR Location Outside lab      Anticoagulation Summary  As of 2020    INR goal:   2.0-3.0   TTR:   78.6 % (9.8 mo)   INR used for dosin.06 (2020)   Warfarin maintenance plan:   9 mg (3 mg x 3) every Wed, Sat; 6 mg (3 mg x 2) all other days   Full warfarin instructions:   9 mg every Wed, Sat; 6 mg all other days   Weekly warfarin total:   48 mg   Plan last modified:   Izabella Verma RN (2020)   Next INR check:   3/6/2020   Priority:   High   Target end date:   Indefinite    Indications    Chronic atrial fibrillation [I48.20]             Anticoagulation Episode Summary     INR check location:   Anticoagulation Clinic    Preferred lab:   [facility]    Send INR reminders to:    INR    Comments:   [facility] leaving mid Dec. 2019, returning in spring.      Anticoagulation Care Providers     Provider Role Specialty Phone number    Donte Mckeon MD [facility] [phone]            See the Encounter Report to view Anticoagulation Flowsheet and Dosing Calendar (Go to Encounters tab in chart review, and find the Anticoagulation Therapy Visit)        Saba Honeycutt RN

## 2020-03-14 ENCOUNTER — MYC REFILL (OUTPATIENT)
Dept: FAMILY MEDICINE | Facility: OTHER | Age: 75
End: 2020-03-14

## 2020-03-14 ENCOUNTER — MYC MEDICAL ADVICE (OUTPATIENT)
Dept: FAMILY MEDICINE | Facility: OTHER | Age: 75
End: 2020-03-14

## 2020-03-14 DIAGNOSIS — I48.20 CHRONIC ATRIAL FIBRILLATION (H): ICD-10-CM

## 2020-03-16 RX ORDER — TORSEMIDE 20 MG/1
20 TABLET ORAL 2 TIMES DAILY
Qty: 180 TABLET | Refills: 11 | Status: SHIPPED | OUTPATIENT
Start: 2020-03-16 | End: 2021-03-15

## 2020-03-18 RX ORDER — TORSEMIDE 20 MG/1
20 TABLET ORAL 2 TIMES DAILY
Qty: 180 TABLET | Refills: 11 | Status: CANCELLED | OUTPATIENT
Start: 2020-03-18

## 2020-03-18 RX ORDER — TORSEMIDE 20 MG/1
20 TABLET ORAL 2 TIMES DAILY
Qty: 180 TABLET | Refills: 0 | OUTPATIENT
Start: 2020-03-18

## 2020-03-18 NOTE — TELEPHONE ENCOUNTER
[facility] sent Rx request for the following:      TORSEMIDE 20 MG TABLET   Sig: Take 1 tablet (20 mg) by mouth 2 times daily      Last Prescription Date:   3/16/2020  Last Fill Qty/Refills:         180, R-11    Last Office Visit:              1/7/2020   Future Office visit:           none    Prescription refused.  This is a duplicate request.  Santiago Sloan RN.......3/18/2020 10:03 AM

## 2020-04-30 ENCOUNTER — ANTICOAGULATION THERAPY VISIT (OUTPATIENT)
Dept: ANTICOAGULATION | Facility: OTHER | Age: 75
End: 2020-04-30
Attending: FAMILY MEDICINE
Payer: MEDICARE

## 2020-04-30 DIAGNOSIS — I48.20 CHRONIC ATRIAL FIBRILLATION (H): ICD-10-CM

## 2020-04-30 LAB — INR POINT OF CARE: 2.5 (ref 0.86–1.14)

## 2020-04-30 PROCEDURE — 85610 PROTHROMBIN TIME: CPT | Mod: QW,ZL

## 2020-04-30 NOTE — PROGRESS NOTES
ANTICOAGULATION FOLLOW-UP CLINIC VISIT    Patient Name:  Angel Garrett  Date:  2020  Contact Type:  Face to Face    SUBJECTIVE:  Patient Findings         Clinical Outcomes     Negatives:   Major bleeding event, Thromboembolic event, Anticoagulation-related hospital admission, Anticoagulation-related ED visit, Anticoagulation-related fatality           OBJECTIVE    INR Protime   Date Value Ref Range Status   2020 2.5 (A) 0.86 - 1.14 Final       ASSESSMENT / PLAN  INR assessment THER    Recheck INR In: 4 WEEKS    INR Location Clinic      Anticoagulation Summary  As of 2020    INR goal:   2.0-3.0   TTR:   82.7 % (1 y)   INR used for dosin.5 (2020)   Warfarin maintenance plan:   9 mg (3 mg x 3) every Sat; 6 mg (3 mg x 2) all other days   Full warfarin instructions:   9 mg every Sat; 6 mg all other days   Weekly warfarin total:   45 mg   Plan last modified:   Saba Honeycutt RN (2020)   Next INR check:   2020   Target end date:   Indefinite    Indications    Chronic atrial fibrillation [I48.20]             Anticoagulation Episode Summary     INR check location:   Anticoagulation Clinic    Preferred lab:   [facility]    Send INR reminders to:    INR    Comments:   [facility] leaving mid Dec. 2019, returning in spring.      Anticoagulation Care Providers     Provider Role Specialty Phone number    Donte Mckeon MD [facility] [phone]            See the Encounter Report to view Anticoagulation Flowsheet and Dosing Calendar (Go to Encounters tab in chart review, and find the Anticoagulation Therapy Visit)        Saba Honeycutt, RN

## 2020-06-10 DIAGNOSIS — I48.20 CHRONIC ATRIAL FIBRILLATION (H): ICD-10-CM

## 2020-06-10 DIAGNOSIS — Z79.01 LONG TERM CURRENT USE OF ANTICOAGULANT THERAPY: ICD-10-CM

## 2020-06-10 RX ORDER — WARFARIN SODIUM 3 MG/1
TABLET ORAL
Qty: 200 TABLET | Refills: 1 | OUTPATIENT
Start: 2020-06-10

## 2020-06-10 RX ORDER — WARFARIN SODIUM 3 MG/1
TABLET ORAL
Qty: 200 TABLET | Refills: 1 | Status: SHIPPED | OUTPATIENT
Start: 2020-06-10 | End: 2020-12-21

## 2020-06-10 NOTE — TELEPHONE ENCOUNTER
"Requested Prescriptions   Pending Prescriptions Disp Refills     warfarin ANTICOAGULANT (COUMADIN) 3 MG tablet [Pharmacy Med Name: WARFARIN SODIUM 3 MG TABLET] 200 tablet 0     Sig: TAKE 9MG ON SATURDAYS AND 6MG OTHER SIX DAYS OF THE WEEK       Vitamin K Antagonists Failed - 6/10/2020  8:44 AM        Failed - INR is within goal in the past 6 weeks     Confirm INR is within goal in the past 6 weeks.     Recent Labs   Lab Test 04/30/20   INR 2.5*                       Passed - Recent (12 mo) or future (30 days) visit within the authorizing provider's specialty     Patient has had an office visit with the authorizing provider or a provider within the authorizing providers department within the previous 12 mos or has a future within next 30 days. See \"Patient Info\" tab in inbasket, or \"Choose Columns\" in Meds & Orders section of the refill encounter.              Passed - Medication is active on med list        Passed - Patient is 18 years of age or older           Prescription approved per [facility] Refill Protocol.    "

## 2020-06-11 ENCOUNTER — ANTICOAGULATION THERAPY VISIT (OUTPATIENT)
Dept: ANTICOAGULATION | Facility: OTHER | Age: 75
End: 2020-06-11
Attending: FAMILY MEDICINE
Payer: MEDICARE

## 2020-06-11 DIAGNOSIS — I48.20 CHRONIC ATRIAL FIBRILLATION (H): Primary | ICD-10-CM

## 2020-06-11 LAB — INR POINT OF CARE: 1.9 (ref 0.86–1.14)

## 2020-06-11 PROCEDURE — 36416 COLLJ CAPILLARY BLOOD SPEC: CPT | Mod: ZL

## 2020-06-11 NOTE — PROGRESS NOTES
ANTICOAGULATION FOLLOW-UP CLINIC VISIT    Patient Name:  Angel Garrett  Date:  2020  Contact Type:  Face to Face    SUBJECTIVE:  Patient Findings         Clinical Outcomes     Negatives:   Major bleeding event, Thromboembolic event, Anticoagulation-related hospital admission, Anticoagulation-related ED visit, Anticoagulation-related fatality           OBJECTIVE    Recent labs: (last 7 days)     20   INR 1.9*       ASSESSMENT / PLAN  INR assessment THER    Recheck INR In: 4 WEEKS    INR Location Clinic      Anticoagulation Summary  As of 2020    INR goal:   2.0-3.0   TTR:   85.2 % (1 y)   INR used for dosin.9! (2020)   Warfarin maintenance plan:   9 mg (3 mg x 3) every Sat; 6 mg (3 mg x 2) all other days   Full warfarin instructions:   : 9 mg; Otherwise 9 mg every Sat; 6 mg all other days   Weekly warfarin total:   45 mg   Plan last modified:   Saba Honeycutt RN (2020)   Next INR check:   2020   Target end date:   Indefinite    Indications    Chronic atrial fibrillation [I48.20]             Anticoagulation Episode Summary     INR check location:   Anticoagulation Clinic    Preferred lab:   [facility]    Send INR reminders to:   [facility]    Comments:   [facility] leaving mid Dec. 2019, returning in spring.      Anticoagulation Care Providers     Provider Role Specialty Phone number    Donte Mckeon MD [facility] [phone]            See the Encounter Report to view Anticoagulation Flowsheet and Dosing Calendar (Go to Encounters tab in chart review, and find the Anticoagulation Therapy Visit)        Saba Honeycutt, RN

## 2020-07-14 ENCOUNTER — ANTICOAGULATION THERAPY VISIT (OUTPATIENT)
Dept: ANTICOAGULATION | Facility: OTHER | Age: 75
End: 2020-07-14
Attending: FAMILY MEDICINE
Payer: MEDICARE

## 2020-07-14 DIAGNOSIS — I48.20 CHRONIC ATRIAL FIBRILLATION (H): ICD-10-CM

## 2020-07-14 LAB — INR POINT OF CARE: 2.1 (ref 0.86–1.14)

## 2020-07-14 PROCEDURE — 36416 COLLJ CAPILLARY BLOOD SPEC: CPT | Mod: ZL

## 2020-07-14 NOTE — PROGRESS NOTES
ANTICOAGULATION FOLLOW-UP CLINIC VISIT    Patient Name:  Angel Garrett  Date:  2020  Contact Type:  Face to Face    SUBJECTIVE:  Patient Findings         Clinical Outcomes     Negatives:   Major bleeding event, Thromboembolic event, Anticoagulation-related hospital admission, Anticoagulation-related ED visit, Anticoagulation-related fatality           OBJECTIVE    Recent labs: (last 7 days)     20   INR 2.1*       ASSESSMENT / PLAN  INR assessment THER    Recheck INR In: 4 WEEKS    INR Location Clinic      Anticoagulation Summary  As of 2020    INR goal:   2.0-3.0   TTR:   80.7 % (1 y)   INR used for dosin.1 (2020)   Warfarin maintenance plan:   9 mg (3 mg x 3) every Sat; 6 mg (3 mg x 2) all other days   Full warfarin instructions:   9 mg every Sat; 6 mg all other days   Weekly warfarin total:   45 mg   No change documented:   Saba Honeycutt RN   Plan last modified:   Saba Honeycutt RN (2020)   Next INR check:   2020   Priority:   Maintenance   Target end date:   Indefinite    Indications    Chronic atrial fibrillation (H) [I48.20]             Anticoagulation Episode Summary     INR check location:   Anticoagulation Clinic    Preferred lab:   [facility]    Send INR reminders to:   [facility]    Comments:   [facility] leaving mid Dec., returning in spring.      Anticoagulation Care Providers     Provider Role Specialty Phone number    Donte Mckeon MD [facility] [phone]            See the Encounter Report to view Anticoagulation Flowsheet and Dosing Calendar (Go to Encounters tab in chart review, and find the Anticoagulation Therapy Visit)        Saba Honeycutt RN

## 2020-08-24 ENCOUNTER — TELEPHONE (OUTPATIENT)
Dept: FAMILY MEDICINE | Facility: OTHER | Age: 75
End: 2020-08-24

## 2020-08-24 DIAGNOSIS — I10 ESSENTIAL HYPERTENSION: Primary | ICD-10-CM

## 2020-08-24 NOTE — TELEPHONE ENCOUNTER
Patient called in regards to updating PCP on his recent cardio appt. He stats he was switched to lasix. He would like a return call back Susan Saenz on 8/24/2020 at 11:11 AM

## 2020-08-24 NOTE — TELEPHONE ENCOUNTER
Called patient and verified name and date of birth. Patient was seen at [place] by his cardiologist and was directed to take torsemide 40 mg daily. Prior to this appointment he took 20 mg daily and then took an extra 20 mg as needed for edema.    He was directed to have a CMP done in 2 weeks and would like [facility] to place that order. I pended it.   He also was told to come in for a chest x-ray in 3 months. I sent him to scheduling to make that appointment.      Isabelle Turner LPN on 8/24/2020 at 11:40 AM

## 2020-09-01 DIAGNOSIS — I10 ESSENTIAL HYPERTENSION: ICD-10-CM

## 2020-09-01 LAB
ALBUMIN SERPL-MCNC: 3.8 G/DL (ref 3.5–5.7)
ALP SERPL-CCNC: 98 U/L (ref 34–104)
ALT SERPL W P-5'-P-CCNC: 10 U/L (ref 7–52)
ANION GAP SERPL CALCULATED.3IONS-SCNC: 7 MMOL/L (ref 3–14)
AST SERPL W P-5'-P-CCNC: 17 U/L (ref 13–39)
BILIRUB SERPL-MCNC: 1 MG/DL (ref 0.3–1)
BUN SERPL-MCNC: 30 MG/DL (ref 7–25)
CALCIUM SERPL-MCNC: 8.5 MG/DL (ref 8.6–10.3)
CHLORIDE SERPL-SCNC: 100 MMOL/L (ref 98–107)
CO2 SERPL-SCNC: 32 MMOL/L (ref 21–31)
CREAT SERPL-MCNC: 1.43 MG/DL (ref 0.7–1.3)
GFR SERPL CREATININE-BSD FRML MDRD: 48 ML/MIN/{1.73_M2}
GLUCOSE SERPL-MCNC: 115 MG/DL (ref 70–105)
POTASSIUM SERPL-SCNC: 4 MMOL/L (ref 3.5–5.1)
PROT SERPL-MCNC: 7 G/DL (ref 6.4–8.9)
SODIUM SERPL-SCNC: 139 MMOL/L (ref 134–144)

## 2020-09-01 PROCEDURE — 36415 COLL VENOUS BLD VENIPUNCTURE: CPT | Mod: ZL | Performed by: FAMILY MEDICINE

## 2020-09-01 PROCEDURE — 80053 COMPREHEN METABOLIC PANEL: CPT | Mod: ZL | Performed by: FAMILY MEDICINE

## 2020-09-28 ENCOUNTER — TELEPHONE (OUTPATIENT)
Dept: FAMILY MEDICINE | Facility: OTHER | Age: 75
End: 2020-09-28

## 2020-09-28 DIAGNOSIS — I10 ESSENTIAL HYPERTENSION: ICD-10-CM

## 2020-09-28 DIAGNOSIS — I27.20 PULMONARY HYPERTENSION (H): Primary | ICD-10-CM

## 2020-09-28 DIAGNOSIS — J90 PLEURAL EFFUSION, RIGHT: ICD-10-CM

## 2020-09-28 DIAGNOSIS — I48.20 CHRONIC ATRIAL FIBRILLATION (H): ICD-10-CM

## 2020-09-28 NOTE — TELEPHONE ENCOUNTER
[facility] doctor's plan from 8/18/20:          RECOMMENDATIONS  1. Continue current medical therapy consisting of metoprolol, torsemide (see new dosage above), tadalafil, and digoxin. The patient should follow up with his primary care provider for monitoring of electrolytes and chest x-ray.  2. Continue chronic anticoagulation with warfarin with target INR of 2.0-3.0.  3. Follow up in the [facility] in 6 moths  4. I will send a letter and make phone call Dr. Mckeon (his PCP)    Electronically signed by Morgan Santoro M.B.B.S., M.P.H. at 08/19/2020 1:12 PM CDT          Izabella Hernandez LPN  9/28/2020  11:58 AM

## 2020-09-28 NOTE — TELEPHONE ENCOUNTER
Orders for lab and chest xray placed.  He probably can just do them the day of his appointment.  Donte Mckeon MD on 9/28/2020 at 5:25 PM

## 2020-09-29 NOTE — TELEPHONE ENCOUNTER
After the patient's name and date of birth was verified, the patient was told the below information.  Cyndee Berry LPN..................9/29/2020   12:12 PM

## 2020-10-01 DIAGNOSIS — J90 PLEURAL EFFUSION, RIGHT: ICD-10-CM

## 2020-10-01 DIAGNOSIS — I27.20 PULMONARY HYPERTENSION (H): ICD-10-CM

## 2020-10-01 DIAGNOSIS — I10 ESSENTIAL HYPERTENSION: ICD-10-CM

## 2020-10-01 DIAGNOSIS — I48.20 CHRONIC ATRIAL FIBRILLATION (H): ICD-10-CM

## 2020-10-01 LAB
ANION GAP SERPL CALCULATED.3IONS-SCNC: 6 MMOL/L (ref 3–14)
BUN SERPL-MCNC: 30 MG/DL (ref 7–25)
CALCIUM SERPL-MCNC: 9.1 MG/DL (ref 8.6–10.3)
CHLORIDE SERPL-SCNC: 103 MMOL/L (ref 98–107)
CO2 SERPL-SCNC: 32 MMOL/L (ref 21–31)
CREAT SERPL-MCNC: 1.43 MG/DL (ref 0.7–1.3)
GFR SERPL CREATININE-BSD FRML MDRD: 48 ML/MIN/{1.73_M2}
GLUCOSE SERPL-MCNC: 99 MG/DL (ref 70–105)
POTASSIUM SERPL-SCNC: 3.8 MMOL/L (ref 3.5–5.1)
SODIUM SERPL-SCNC: 141 MMOL/L (ref 134–144)

## 2020-10-01 PROCEDURE — 80048 BASIC METABOLIC PNL TOTAL CA: CPT | Mod: ZL | Performed by: FAMILY MEDICINE

## 2020-10-01 PROCEDURE — 36415 COLL VENOUS BLD VENIPUNCTURE: CPT | Mod: ZL | Performed by: FAMILY MEDICINE

## 2020-11-23 ENCOUNTER — HOSPITAL ENCOUNTER (OUTPATIENT)
Dept: GENERAL RADIOLOGY | Facility: OTHER | Age: 75
End: 2020-11-23
Attending: FAMILY MEDICINE
Payer: MEDICARE

## 2020-11-23 ENCOUNTER — ANTICOAGULATION THERAPY VISIT (OUTPATIENT)
Dept: ANTICOAGULATION | Facility: OTHER | Age: 75
End: 2020-11-23
Attending: FAMILY MEDICINE
Payer: MEDICARE

## 2020-11-23 ENCOUNTER — OFFICE VISIT (OUTPATIENT)
Dept: FAMILY MEDICINE | Facility: OTHER | Age: 75
End: 2020-11-23
Attending: FAMILY MEDICINE
Payer: MEDICARE

## 2020-11-23 VITALS
DIASTOLIC BLOOD PRESSURE: 78 MMHG | WEIGHT: 205.2 LBS | RESPIRATION RATE: 16 BRPM | HEART RATE: 59 BPM | SYSTOLIC BLOOD PRESSURE: 136 MMHG | BODY MASS INDEX: 32.62 KG/M2 | OXYGEN SATURATION: 96 % | TEMPERATURE: 97.2 F

## 2020-11-23 DIAGNOSIS — I48.20 CHRONIC ATRIAL FIBRILLATION (H): ICD-10-CM

## 2020-11-23 DIAGNOSIS — I10 ESSENTIAL HYPERTENSION: Primary | ICD-10-CM

## 2020-11-23 DIAGNOSIS — R73.03 PREDIABETES: ICD-10-CM

## 2020-11-23 DIAGNOSIS — Z79.01 LONG TERM CURRENT USE OF ANTICOAGULANT THERAPY: ICD-10-CM

## 2020-11-23 DIAGNOSIS — I10 ESSENTIAL HYPERTENSION: ICD-10-CM

## 2020-11-23 DIAGNOSIS — J90 PLEURAL EFFUSION, RIGHT: ICD-10-CM

## 2020-11-23 DIAGNOSIS — N18.31 STAGE 3A CHRONIC KIDNEY DISEASE (H): ICD-10-CM

## 2020-11-23 DIAGNOSIS — I27.20 PULMONARY HYPERTENSION (H): ICD-10-CM

## 2020-11-23 PROBLEM — N18.30 CHRONIC KIDNEY DISEASE, STAGE 3 (H): Status: ACTIVE | Noted: 2020-11-23

## 2020-11-23 LAB
ANION GAP SERPL CALCULATED.3IONS-SCNC: 5 MMOL/L (ref 3–14)
BUN SERPL-MCNC: 30 MG/DL (ref 7–25)
CALCIUM SERPL-MCNC: 9 MG/DL (ref 8.6–10.3)
CHLORIDE SERPL-SCNC: 103 MMOL/L (ref 98–107)
CO2 SERPL-SCNC: 32 MMOL/L (ref 21–31)
CREAT SERPL-MCNC: 1.45 MG/DL (ref 0.7–1.3)
DIFFERENTIAL METHOD BLD: ABNORMAL
EOSINOPHIL # BLD AUTO: 0.1 10E9/L (ref 0–0.7)
EOSINOPHIL NFR BLD AUTO: 2 %
ERYTHROCYTE [DISTWIDTH] IN BLOOD BY AUTOMATED COUNT: 12.8 % (ref 10–15)
GFR SERPL CREATININE-BSD FRML MDRD: 47 ML/MIN/{1.73_M2}
GLUCOSE SERPL-MCNC: 109 MG/DL (ref 70–105)
HBA1C MFR BLD: 5.5 % (ref 4–6)
HCT VFR BLD AUTO: 38.9 % (ref 40–53)
HGB BLD-MCNC: 12.7 G/DL (ref 13.3–17.7)
INR POINT OF CARE: 2.6 (ref 0.86–1.14)
LYMPHOCYTES # BLD AUTO: 1.3 10E9/L (ref 0.8–5.3)
LYMPHOCYTES NFR BLD AUTO: 24 %
MCH RBC QN AUTO: 35.2 PG (ref 26.5–33)
MCHC RBC AUTO-ENTMCNC: 32.6 G/DL (ref 31.5–36.5)
MCV RBC AUTO: 108 FL (ref 78–100)
MONOCYTES # BLD AUTO: 0.4 10E9/L (ref 0–1.3)
MONOCYTES NFR BLD AUTO: 8 %
NEUTROPHILS # BLD AUTO: 3.5 10E9/L (ref 1.6–8.3)
NEUTROPHILS NFR BLD AUTO: 66 %
PLATELET # BLD AUTO: 169 10E9/L (ref 150–450)
POTASSIUM SERPL-SCNC: 3.9 MMOL/L (ref 3.5–5.1)
RBC # BLD AUTO: 3.61 10E12/L (ref 4.4–5.9)
SODIUM SERPL-SCNC: 140 MMOL/L (ref 134–144)
WBC # BLD AUTO: 5.3 10E9/L (ref 4–11)

## 2020-11-23 PROCEDURE — 80048 BASIC METABOLIC PNL TOTAL CA: CPT | Mod: ZL | Performed by: FAMILY MEDICINE

## 2020-11-23 PROCEDURE — G0463 HOSPITAL OUTPT CLINIC VISIT: HCPCS

## 2020-11-23 PROCEDURE — 83036 HEMOGLOBIN GLYCOSYLATED A1C: CPT | Mod: ZL | Performed by: FAMILY MEDICINE

## 2020-11-23 PROCEDURE — 36415 COLL VENOUS BLD VENIPUNCTURE: CPT | Mod: ZL | Performed by: FAMILY MEDICINE

## 2020-11-23 PROCEDURE — 85025 COMPLETE CBC W/AUTO DIFF WBC: CPT | Mod: ZL | Performed by: FAMILY MEDICINE

## 2020-11-23 PROCEDURE — 85610 PROTHROMBIN TIME: CPT | Mod: ZL

## 2020-11-23 PROCEDURE — 71046 X-RAY EXAM CHEST 2 VIEWS: CPT

## 2020-11-23 PROCEDURE — 99214 OFFICE O/P EST MOD 30 MIN: CPT | Performed by: FAMILY MEDICINE

## 2020-11-23 PROCEDURE — G0463 HOSPITAL OUTPT CLINIC VISIT: HCPCS | Mod: 25

## 2020-11-23 RX ORDER — CHLORAL HYDRATE 500 MG
2 CAPSULE ORAL DAILY
COMMUNITY

## 2020-11-23 ASSESSMENT — PAIN SCALES - GENERAL: PAINLEVEL: NO PAIN (0)

## 2020-11-23 NOTE — PROGRESS NOTES
ANTICOAGULATION FOLLOW-UP CLINIC VISIT    Patient Name:  Angel Garrett  Date:  2020  Contact Type:  Face to Face    SUBJECTIVE:  Patient Findings         Clinical Outcomes     Negatives:  Major bleeding event, Thromboembolic event, Anticoagulation-related hospital admission, Anticoagulation-related ED visit, Anticoagulation-related fatality           OBJECTIVE    Recent labs: (last 7 days)     20   INR 2.6*       ASSESSMENT / PLAN  INR assessment THER    Recheck INR In: 6 WEEKS    INR Location Clinic      Anticoagulation Summary  As of 2020    INR goal:  2.0-3.0   TTR:  71.3 % (7.8 mo)   INR used for dosin.6 (2020)   Warfarin maintenance plan:  9 mg (3 mg x 3) every Sat; 6 mg (3 mg x 2) all other days   Full warfarin instructions:  9 mg every Sat; 6 mg all other days   Weekly warfarin total:  45 mg   No change documented:  Saba Honeycutt RN   Plan last modified:  Saba Honeycutt RN (2020)   Next INR check:  2021   Priority:  Maintenance   Target end date:  Indefinite    Indications    Chronic atrial fibrillation (H) [I48.20]             Anticoagulation Episode Summary     INR check location:  Anticoagulation Clinic    Preferred lab:  [facility]    Send INR reminders to:  [facility]    Comments:  [place] leaving mid Dec., returning in spring.      Anticoagulation Care Providers     Provider Role Specialty Phone number    Donte Mckeon MD [facility] [phone]            See the Encounter Report to view Anticoagulation Flowsheet and Dosing Calendar (Go to Encounters tab in chart review, and find the Anticoagulation Therapy Visit)        Saba Honeycutt RN

## 2020-11-23 NOTE — NURSING NOTE
"Patient presents to clinic for a follow up of a chest xray in comparison to a [facility] visit 3 months ago.  Chief Complaint   Patient presents with     RECHECK     chest xray       Initial /78 (BP Location: Right arm, Patient Position: Sitting, Cuff Size: Adult Large)   Pulse 59   Temp 97.2  F (36.2  C) (Tympanic)   Resp 16   Wt 93.1 kg (205 lb 3.2 oz)   SpO2 96%   BMI 32.62 kg/m   Estimated body mass index is 32.62 kg/m  as calculated from the following:    Height as of 12/10/19: 1.689 m (5' 6.5\").    Weight as of this encounter: 93.1 kg (205 lb 3.2 oz).  Medication Reconciliation: complete    More Fletcher LPN    "

## 2020-11-23 NOTE — PROGRESS NOTES
"Nursing Notes:   More Fletcher LPN  11/23/2020 10:38 AM  Signed  Patient presents to clinic for a follow up of a chest xray in comparison to a [place] clinic visit 3 months ago.  Chief Complaint   Patient presents with     RECHECK     chest xray       Initial /78 (BP Location: Right arm, Patient Position: Sitting, Cuff Size: Adult Large)   Pulse 59   Temp 97.2  F (36.2  C) (Tympanic)   Resp 16   Wt 93.1 kg (205 lb 3.2 oz)   SpO2 96%   BMI 32.62 kg/m   Estimated body mass index is 32.62 kg/m  as calculated from the following:    Height as of 12/10/19: 1.689 m (5' 6.5\").    Weight as of this encounter: 93.1 kg (205 lb 3.2 oz).  Medication Reconciliation: complete    More Fletcher LPN        SUBJECTIVE:  Angel Garrett  is a 75 year old male who comes in today for follow-up.  I last saw him in January of this year.  He is followed by [facility].  He last saw cardiology in August.  He continues on metoprolol, torsemide, tadalafil, digoxin.  He was to have electrolyte monitoring and chest x-ray done which was completed in October.  His lab was fine and his chest x-ray was never completed despite being ordered.    He is up-to-date on immunizations but has not had Shingrix.    His breathing has been better.  He is feeling well.  He is still has a bit less stamina.  He has been walking and does it frequently. He is sleeping in bed and feels well at rest.  He is scheduled to go back to [place] for follow up in the fall.     Past Medical, Family, and Social History reviewed and updated as noted below.   ROS is negative except as noted above       Allergies   Allergen Reactions     Pollen Extract Other (See Comments)     Nasal congestion  Nasal congestion   ,   Family History   Problem Relation Age of Onset     Heart Disease Mother         Heart Disease     Heart Disease Father         Heart Disease,CHF     Other - See Comments Father         PUD     Other - See Comments Brother         Liver or kidney cancer     " Family History Negative Sister         Good Health     Diabetes Brother         Diabetes     Family History Negative Brother         Good Health     Family History Negative Brother         Good Health     Family History Negative Brother         Good Health   ,   Current Outpatient Medications   Medication     fish oil-omega-3 fatty acids 1000 MG capsule     Glucosamine Sulfate 1000 MG TABS     acetaminophen (TYLENOL) 325 MG tablet     digoxin (LANOXIN) 250 MCG tablet     metoprolol succinate ER (TOPROL-XL) 50 MG 24 hr tablet     Tadalafil, PAH, (ADCIRCA PO)     torsemide (DEMADEX) 20 MG tablet     warfarin ANTICOAGULANT (COUMADIN) 3 MG tablet     No current facility-administered medications for this visit.    , No past medical history on file.,   Patient Active Problem List    Diagnosis Date Noted     Chronic kidney disease, stage 3 11/23/2020     Priority: Medium     Acute renal failure (H) 10/04/2018     Priority: Medium     Fall at home 05/10/2018     Priority: Medium     Long term current use of anticoagulant therapy 05/10/2018     Priority: Medium     Closed fracture of multiple ribs of right side, initial encounter 05/10/2018     Priority: Medium     Chronic atrial fibrillation (H) 02/11/2018     Priority: Medium     Anticoagulation monitoring, INR range 2-3 02/11/2018     Priority: Medium     Dysphonia 05/22/2017     Priority: Medium     Peritonsillar abscess 05/15/2017     Priority: Medium     Pulmonary hypertension (H) 05/15/2017     Priority: Medium     Obstructive sleep apnea 11/28/2016     Priority: Medium     Arteriosclerosis of coronary artery 04/15/2016     Priority: Medium     Hx of congenital heart disease 12/05/2013     Priority: Medium     Hyperlipemia 12/05/2013     Priority: Medium     Hypertension 12/05/2013     Priority: Medium     Obesity 12/05/2013     Priority: Medium     Prediabetes 12/05/2013     Priority: Medium     Atrial septal defect 04/25/2003     Priority: Medium   ,   Past Surgical  History:   Procedure Laterality Date     COLONOSCOPY  2009,Colonography     OTHER SURGICAL HISTORY      2008,,OPEN CHOLECYSTECTOMY     OTHER SURGICAL HISTORY      ,49231,CARDIAC SURGERY,Pericardial patch, closure of ASD, repair of PFO and repair of anomalous pulmonary venous drainage.     OTHER SURGICAL HISTORY      2016,[id number],CARDIAC CATHETERIZATION     Right total hip arthroplasty Right 10/2018    Demario Green MD,  [facility]     VASECTOMY      No Comments Provided    and   Social History     Tobacco Use     Smoking status: Former Smoker     Quit date: 1984     Years since quittin.2     Smokeless tobacco: Never Used   Substance Use Topics     Alcohol use: Yes     Alcohol/week: 5.8 standard drinks     Comment: Alcoholic Drinks/day: 1 drinks a day     OBJECTIVE:  /78 (BP Location: Right arm, Patient Position: Sitting, Cuff Size: Adult Large)   Pulse 59   Temp 97.2  F (36.2  C) (Tympanic)   Resp 16   Wt 93.1 kg (205 lb 3.2 oz)   SpO2 96%   BMI 32.62 kg/m     EXAM:  Alert and cooperative, no distress.  Lungs are clear with the exception of the right base worse breath sounds are diminished.  Air movement is good with no rhonchi or wheezes.  Cardiac irregularly irregular.  He has scant dependent edema.    Results for orders placed or performed in visit on 20   Hemoglobin A1c     Status: None   Result Value Ref Range    Hemoglobin A1C 5.5 4.0 - 6.0 %   Basic metabolic panel     Status: Abnormal   Result Value Ref Range    Sodium 140 134 - 144 mmol/L    Potassium 3.9 3.5 - 5.1 mmol/L    Chloride 103 98 - 107 mmol/L    Carbon Dioxide 32 (H) 21 - 31 mmol/L    Anion Gap 5 3 - 14 mmol/L    Glucose 109 (H) 70 - 105 mg/dL    Urea Nitrogen 30 (H) 7 - 25 mg/dL    Creatinine 1.45 (H) 0.70 - 1.30 mg/dL    GFR Estimate 47 (L) >60 mL/min/[1.73_m2]    GFR Estimate If Black 57 (L) >60 mL/min/[1.73_m2]    Calcium 9.0 8.6 - 10.3 mg/dL   CBC with platelets differential      Status: Abnormal   Result Value Ref Range    WBC 5.3 4.0 - 11.0 10e9/L    RBC Count 3.61 (L) 4.4 - 5.9 10e12/L    Hemoglobin 12.7 (L) 13.3 - 17.7 g/dL    Hematocrit 38.9 (L) 40.0 - 53.0 %     (H) 78 - 100 fl    MCH 35.2 (H) 26.5 - 33.0 pg    MCHC 32.6 31.5 - 36.5 g/dL    RDW 12.8 10.0 - 15.0 %    Platelet Count 169 150 - 450 10e9/L    Diff Method Automated Method     % Neutrophils 66.0 %    % Lymphocytes 24.0 %    % Monocytes 8.0 %    % Eosinophils 2.0 %    Absolute Neutrophil 3.5 1.6 - 8.3 10e9/L    Absolute Lymphocytes 1.3 0.8 - 5.3 10e9/L    Absolute Monocytes 0.4 0.0 - 1.3 10e9/L    Absolute Eosinophils 0.1 0.0 - 0.7 10e9/L   Results for orders placed or performed during the hospital encounter of 11/23/20   XR Chest 2 Views     Status: None    Narrative    PROCEDURE: XR CHEST 2 VW 11/23/2020 9:58 AM    HISTORY: Pulmonary hypertension (H); Essential hypertension; Chronic  atrial fibrillation (H); Pleural effusion, right    COMPARISONS: 5/10/2018.    TECHNIQUE: 2 views.    FINDINGS: There has been a median sternotomy. Heart remains enlarged.  Linear change in the left lung is stable.    There is increasing abnormal density at the right lung base, probably  a combination of infiltrate and right-sided effusion. Some perihilar  infiltrate is seen as well.         Impression    IMPRESSION: Increasing density at the right lung base consistent with  increasing fluid and airspace disease.    DEVAN SOLIS MD   Results for orders placed or performed in visit on 11/23/20   INR point of care     Status: Abnormal   Result Value Ref Range    INR Protime 2.6 (A) 0.86 - 1.14      ASSESSMENT/Plan :    Angel was seen today for recheck.    Diagnoses and all orders for this visit:    Essential hypertension  -     CBC with platelets differential; Future  -     Basic metabolic panel; Future  -     Basic metabolic panel  -     CBC with platelets differential    Pulmonary hypertension (H)  -     XR Chest 2 Views;  Future    Chronic atrial fibrillation (H)  -     CBC with platelets differential; Future  -     Basic metabolic panel; Future  -     XR Chest 2 Views; Future  -     Basic metabolic panel  -     CBC with platelets differential    Prediabetes  -     CBC with platelets differential; Future  -     Basic metabolic panel; Future  -     Hemoglobin A1c; Future  -     Hemoglobin A1c  -     Basic metabolic panel  -     CBC with platelets differential    Long term current use of anticoagulant therapy  -     CBC with platelets differential; Future  -     CBC with platelets differential    Stage 3a chronic kidney disease      Renal function is stable.  Chest x-ray is unchanged from previous.  Symptoms have improved and he seems to be doing well on his current regimen.  We discussed gradual increase in exercise over the course of the winter doing small short walks and increasing the time and frequency has tolerated.  [place] not going [place] this winter which will make it more difficult for him to get his exercise in.  Discussed having him potentially look into getting a treadmill.  If he has any more difficulty with breathing, he will let us know.    A total of 25 minutes was spent with the patient, greater than 50% of the time was spent in counseling/discussion of the aforementioned concerns.       Donte Mckeon MD

## 2020-11-24 ENCOUNTER — MEDICAL CORRESPONDENCE (OUTPATIENT)
Dept: HEALTH INFORMATION MANAGEMENT | Facility: OTHER | Age: 75
End: 2020-11-24

## 2020-12-14 DIAGNOSIS — I10 ESSENTIAL HYPERTENSION: ICD-10-CM

## 2020-12-14 DIAGNOSIS — I48.20 CHRONIC ATRIAL FIBRILLATION (H): ICD-10-CM

## 2020-12-14 RX ORDER — DIGOXIN 250 MCG
250 TABLET ORAL DAILY
Qty: 90 TABLET | Refills: 11 | Status: SHIPPED | OUTPATIENT
Start: 2020-12-14 | End: 2022-03-14

## 2020-12-14 RX ORDER — METOPROLOL SUCCINATE 50 MG/1
TABLET, EXTENDED RELEASE ORAL
Qty: 90 TABLET | Refills: 3 | Status: SHIPPED | OUTPATIENT
Start: 2020-12-14 | End: 2021-12-08

## 2020-12-21 DIAGNOSIS — Z79.01 LONG TERM CURRENT USE OF ANTICOAGULANT THERAPY: ICD-10-CM

## 2020-12-21 DIAGNOSIS — I48.20 CHRONIC ATRIAL FIBRILLATION (H): ICD-10-CM

## 2020-12-21 RX ORDER — WARFARIN SODIUM 3 MG/1
TABLET ORAL
Qty: 200 TABLET | Refills: 1 | Status: SHIPPED | OUTPATIENT
Start: 2020-12-21 | End: 2021-02-16

## 2020-12-21 NOTE — TELEPHONE ENCOUNTER
"Requested Prescriptions   Pending Prescriptions Disp Refills     warfarin ANTICOAGULANT (COUMADIN) 3 MG tablet 200 tablet 1     Sig: Take 9 mg x 1 day and 6 mg x five days/week.  [facility]       Vitamin K Antagonists Failed - 12/21/2020  4:15 PM        Failed - INR is within goal in the past 6 weeks     Confirm INR is within goal in the past 6 weeks.     Recent Labs   Lab Test 11/23/20   INR 2.6*                       Passed - Recent (12 mo) or future (30 days) visit within the authorizing provider's specialty     Patient has had an office visit with the authorizing provider or a provider within the authorizing providers department within the previous 12 mos or has a future within next 30 days. See \"Patient Info\" tab in inbasket, or \"Choose Columns\" in Meds & Orders section of the refill encounter.              Passed - Medication is active on med list        Passed - Patient is 18 years of age or older           Prescription approved per [facility] Refill Protocol.    "

## 2021-01-15 ENCOUNTER — HEALTH MAINTENANCE LETTER (OUTPATIENT)
Age: 76
End: 2021-01-15

## 2021-01-28 ENCOUNTER — MYC MEDICAL ADVICE (OUTPATIENT)
Dept: FAMILY MEDICINE | Facility: OTHER | Age: 76
End: 2021-01-28

## 2021-02-16 ENCOUNTER — IMMUNIZATION (OUTPATIENT)
Dept: FAMILY MEDICINE | Facility: OTHER | Age: 76
End: 2021-02-16
Attending: FAMILY MEDICINE
Payer: MEDICARE

## 2021-02-16 ENCOUNTER — ANTICOAGULATION THERAPY VISIT (OUTPATIENT)
Dept: ANTICOAGULATION | Facility: OTHER | Age: 76
End: 2021-02-16
Attending: FAMILY MEDICINE
Payer: MEDICARE

## 2021-02-16 DIAGNOSIS — Z79.01 LONG TERM CURRENT USE OF ANTICOAGULANT THERAPY: ICD-10-CM

## 2021-02-16 DIAGNOSIS — I48.20 CHRONIC ATRIAL FIBRILLATION (H): ICD-10-CM

## 2021-02-16 LAB — INR POINT OF CARE: 2.3 (ref 0.86–1.14)

## 2021-02-16 PROCEDURE — 36416 COLLJ CAPILLARY BLOOD SPEC: CPT | Mod: ZL

## 2021-02-16 PROCEDURE — 91300 PR COVID VAC PFIZER DIL RECON 30 MCG/0.3 ML IM: CPT

## 2021-02-16 RX ORDER — WARFARIN SODIUM 3 MG/1
TABLET ORAL
Qty: 200 TABLET | Refills: 1
Start: 2021-02-16 | End: 2021-06-10

## 2021-02-16 NOTE — PROGRESS NOTES
ANTICOAGULATION FOLLOW-UP CLINIC VISIT    Patient Name:  Angel Garrett  Date:  2021  Contact Type:  Face to Face    SUBJECTIVE:  Patient Findings         Clinical Outcomes     Negatives:  Major bleeding event, Thromboembolic event, Anticoagulation-related hospital admission, Anticoagulation-related ED visit, Anticoagulation-related fatality           OBJECTIVE    Recent labs: (last 7 days)     21   INR 2.3*       ASSESSMENT / PLAN  INR assessment THER    Recheck INR In: 6 WEEKS    INR Location Clinic      Anticoagulation Summary  As of 2021    INR goal:  2.0-3.0   TTR:  89.3 % (7.8 mo)   INR used for dosin.3 (2021)   Warfarin maintenance plan:  9 mg (3 mg x 3) every Sat; 6 mg (3 mg x 2) all other days   Full warfarin instructions:  9 mg every Sat; 6 mg all other days   Weekly warfarin total:  45 mg   No change documented:  Saba Honeycutt RN   Plan last modified:  Saba Honeycutt RN (2020)   Next INR check:  3/30/2021   Priority:  Maintenance   Target end date:  Indefinite    Indications    Chronic atrial fibrillation (H) [I48.20]             Anticoagulation Episode Summary     INR check location:  Anticoagulation Clinic    Preferred lab:  [facility]    Send INR reminders to:  [facility]    Comments:  [place] leaving mid Dec., returning in spring.      Anticoagulation Care Providers     Provider Role Specialty Phone number    Donte Mckeon MD [facility] [phone]            See the Encounter Report to view Anticoagulation Flowsheet and Dosing Calendar (Go to Encounters tab in chart review, and find the Anticoagulation Therapy Visit)        Saba Honeycutt RN

## 2021-02-22 ENCOUNTER — DOCUMENTATION ONLY (OUTPATIENT)
Dept: OTHER | Facility: CLINIC | Age: 76
End: 2021-02-22

## 2021-03-09 ENCOUNTER — IMMUNIZATION (OUTPATIENT)
Dept: FAMILY MEDICINE | Facility: OTHER | Age: 76
End: 2021-03-09
Attending: FAMILY MEDICINE
Payer: MEDICARE

## 2021-03-09 PROCEDURE — 91300 PR COVID VAC PFIZER DIL RECON 30 MCG/0.3 ML IM: CPT

## 2021-03-15 DIAGNOSIS — I48.20 CHRONIC ATRIAL FIBRILLATION (H): ICD-10-CM

## 2021-03-15 RX ORDER — TORSEMIDE 20 MG/1
TABLET ORAL
Qty: 180 TABLET | Refills: 8 | Status: SHIPPED | OUTPATIENT
Start: 2021-03-15 | End: 2021-09-14

## 2021-03-15 NOTE — TELEPHONE ENCOUNTER
[facility] sent Rx request for the following:   torsemide (DEMADEX) 20 MG tablet  Sig:TAKE 1 TABLET BY MOUTH TWICE A DAY    Last Prescription Date:   03/16/2020  Last Fill Qty/Refills:         180, R-11    Last Office Visit:              11/23/2020 (Mike)   Future Office visit:           None noted   Diuretics (Including Combos) Protocol Failed - 3/15/2021 12:15 AM        Failed - Normal serum creatinine on file in past 12 months     Recent Labs   Lab Test 11/23/20  1102   CR 1.45*           Routing for PCP review. Unable to complete prescription refill per RN Medication Refill Policy.................... Aida Cutler RN ....................  3/15/2021   4:21 PM

## 2021-05-12 NOTE — PROGRESS NOTES
There are no exam notes on file for this visit.    SUBJECTIVE:  Angel Garrett  is a 75 year old male who comes in today for Medicare wellness and follow-up of his other medical problems.  I last saw him in November for follow-up of his breathing issues.  He had follow-up chest x-ray at that time.  His breathing was improving.    He is feeling well. He is a bit more short winded than before.  He had a pleural effusion last year.  His chest x-ray showed improvement the last time we saw him.  Symptoms really have not changed a lot.    He had elevated PSA in the past.  He has nocturia x 1.  He has a good stream.     He has prediabetes which we have monitored over time.  He has atrial fibrillation and pulmonary hypertension.    He is up-to-date on immunizations including COVID-19 but has not had Shingrix.    He is due for dooub.    Past Medical, Family, and Social History reviewed and updated as noted below.   ROS is negative except as noted above       Allergies   Allergen Reactions     Pollen Extract Other (See Comments)     Nasal congestion  Nasal congestion   ,   Family History   Problem Relation Age of Onset     Heart Disease Mother         Heart Disease     Heart Disease Father         Heart Disease,CHF     Other - See Comments Father         PUD     Other - See Comments Brother         Liver or kidney cancer     Family History Negative Sister         Good Health     Diabetes Brother         Diabetes     Family History Negative Brother         Good Health     Family History Negative Brother         Good Health     Family History Negative Brother         Good Health   ,   Current Outpatient Medications   Medication     acetaminophen (TYLENOL) 325 MG tablet     digoxin (LANOXIN) 250 MCG tablet     fish oil-omega-3 fatty acids 1000 MG capsule     Glucosamine Sulfate 1000 MG TABS     metoprolol succinate ER (TOPROL-XL) 50 MG 24 hr tablet     Tadalafil, PAH, (ADCIRCA PO)     torsemide (DEMADEX) 20 MG tablet      warfarin ANTICOAGULANT (COUMADIN) 3 MG tablet     No current facility-administered medications for this visit.    , History reviewed. No pertinent past medical history.,   Patient Active Problem List    Diagnosis Date Noted     Chronic kidney disease, stage 3 11/23/2020     Priority: Medium     Acute renal failure (H) 10/04/2018     Priority: Medium     Fall at home 05/10/2018     Priority: Medium     Long term current use of anticoagulant therapy 05/10/2018     Priority: Medium     Closed fracture of multiple ribs of right side, initial encounter 05/10/2018     Priority: Medium     Chronic atrial fibrillation (H) 02/11/2018     Priority: Medium     Anticoagulation monitoring, INR range 2-3 02/11/2018     Priority: Medium     Dysphonia 05/22/2017     Priority: Medium     Peritonsillar abscess 05/15/2017     Priority: Medium     Pulmonary hypertension (H) 05/15/2017     Priority: Medium     Obstructive sleep apnea 11/28/2016     Priority: Medium     Arteriosclerosis of coronary artery 04/15/2016     Priority: Medium     Hx of congenital heart disease 12/05/2013     Priority: Medium     Hyperlipemia 12/05/2013     Priority: Medium     Hypertension 12/05/2013     Priority: Medium     Obesity 12/05/2013     Priority: Medium     Prediabetes 12/05/2013     Priority: Medium     Atrial septal defect 04/25/2003     Priority: Medium   ,   Past Surgical History:   Procedure Laterality Date     COLONOSCOPY  11/18/2009 11/18/2009,Colonography     OTHER SURGICAL HISTORY      2008,,OPEN CHOLECYSTECTOMY     OTHER SURGICAL HISTORY      2002,76990,CARDIAC SURGERY,Pericardial patch, closure of ASD, repair of PFO and repair of anomalous pulmonary venous drainage.     OTHER SURGICAL HISTORY      2016,QPF599,CARDIAC CATHETERIZATION     Right total hip arthroplasty Right 10/2018    Demario Green MD,  [facility]     VASECTOMY      No Comments Provided    and   Social History     Tobacco Use     Smoking status: Former Smoker      "Quit date: 1984     Years since quittin.6     Smokeless tobacco: Never Used   Substance Use Topics     Alcohol use: Yes     Alcohol/week: 5.8 standard drinks     Comment: Alcoholic Drinks/day: 1 drinks a day     OBJECTIVE:  /70 (BP Location: Right arm, Patient Position: Sitting, Cuff Size: Adult Large)   Pulse 63   Temp 96.4  F (35.8  C) (Tympanic)   Resp 18   Ht 1.727 m (5' 8\")   Wt 93.4 kg (206 lb)   SpO2 96%   BMI 31.32 kg/m     EXAM:  General Appearance: Pleasant, alert, appropriate appearance for age. No acute distress  Head Exam: Normal. Normocephalic, atraumatic.  Eye Exam:  Normal external eyes, conjunctivae, lids, cornea. PRICE. EOMI  Ear Exam: Normal TM's bilaterally. Normal auditory canals and external ears. Non-tender.  Nose Exam: Normal external nose, mucus membranes, and septum.  OroPharynx Exam:  Dental hygiene adequate. Normal buccal mucosa. Normal pharynx.  Neck Exam:  Supple, no masses or nodes. No audible bruits  Thyroid Exam: No nodules or enlargement.  Chest/Respiratory Exam: Normal chest wall and respirations. Clear to auscultation.  Cardiovascular Exam: irregularly irregular . S1, S2, no murmur, click, gallop, or rubs.  Gastrointestinal Exam: Protuberant.  Soft, non-tender, no masses or organomegaly.  Lymphatic Exam: Non-palpable nodes in neck, clavicular regions.  Musculoskeletal Exam: Back is straight and non-tender, full ROM of upper and lower extremities.  Foot Exam: Left and right foot: good pedal pulses  Skin: no rash or abnormalities  Neurologic Exam: Nonfocal, normal gross motor, tone coordination and no tremor.  Psychiatric Exam: Alert and oriented - appropriate affect.     Results for orders placed or performed during the hospital encounter of 21   XR Chest 2 Views     Status: None    Narrative    PROCEDURE:  XR CHEST 2 VW    HISTORY: Pleural effusion, right, .    COMPARISON:  2020    FINDINGS:  The cardiomediastinal contours are enlarged.  The " trachea is midline.  There is right pleural thickening versus loculated pleural fluid.  There is focal opacity at the right lung base. Linear subpleural  opacities are seen bilaterally. No pneumothorax is identified.        Impression    IMPRESSION:      Persistent complex abnormal appearance of the chest. Recommend  follow-up CT chest with contrast.      CHARI KITCHEN MD   Results for orders placed or performed during the hospital encounter of 05/13/21   XR Pelvis and Hip Right 1 View     Status: None    Narrative    XR PELVIS AND HIP RIGHT 1 VIEW    HISTORY: results to Dr. Lazcano at [facility]; H/O total  hip arthroplasty, right .    COMPARISON: 5/10/2018.    TECHNIQUE: AP pelvis and frog-leg view right hip.    FINDINGS:    Postoperative changes of right total hip arthroplasty are seen. There  is no evidence of periprosthetic loosening or fracture. Osteopenia.  Lumbar degenerative changes are partially visualized. The left hip  joint space is relatively preserved.        Impression    IMPRESSION:     Interval right total hip arthroplasty.      CHARI KITCHEN MD   Results for orders placed or performed in visit on 05/13/21   Prostate Specific Antigen GH     Status: Abnormal   Result Value Ref Range    Prostate Specific Antigen 3.510 (H) <3.100 ng/mL   Thyrotropin GH     Status: None   Result Value Ref Range    Thyrotropin 2.46 0.34 - 5.60 IU/mL   Hemoglobin A1c     Status: None   Result Value Ref Range    Hemoglobin A1C 5.6 4.0 - 6.0 %   Lipid Profile     Status: None   Result Value Ref Range    Cholesterol 123 <200 mg/dL    Triglycerides 65 <150 mg/dL    HDL Cholesterol 53 23 - 92 mg/dL    LDL Cholesterol Calculated 57 <100 mg/dL    Non HDL Cholesterol 70 <130 mg/dL   Comprehensive metabolic panel     Status: Abnormal   Result Value Ref Range    Sodium 141 134 - 144 mmol/L    Potassium 4.4 3.5 - 5.1 mmol/L    Chloride 103 98 - 107 mmol/L    Carbon Dioxide 32 (H) 21 - 31 mmol/L    Anion  Gap 6 3 - 14 mmol/L    Glucose 117 (H) 70 - 105 mg/dL    Urea Nitrogen 30 (H) 7 - 25 mg/dL    Creatinine 1.34 (H) 0.70 - 1.30 mg/dL    GFR Estimate 52 (L) >60 mL/min/[1.73_m2]    GFR Estimate If Black 63 >60 mL/min/[1.73_m2]    Calcium 9.5 8.6 - 10.3 mg/dL    Bilirubin Total 0.9 0.3 - 1.0 mg/dL    Albumin 3.9 3.5 - 5.7 g/dL    Protein Total 7.3 6.4 - 8.9 g/dL    Alkaline Phosphatase 75 34 - 104 U/L    ALT 9 7 - 52 U/L    AST 17 13 - 39 U/L   CBC with platelets differential     Status: Abnormal   Result Value Ref Range    WBC 5.6 4.0 - 11.0 10e9/L    RBC Count 3.44 (L) 4.4 - 5.9 10e12/L    Hemoglobin 12.3 (L) 13.3 - 17.7 g/dL    Hematocrit 37.2 (L) 40.0 - 53.0 %     (H) 78 - 100 fl    MCH 35.8 (H) 26.5 - 33.0 pg    MCHC 33.1 31.5 - 36.5 g/dL    RDW 13.3 10.0 - 15.0 %    Platelet Count 154 150 - 450 10e9/L    Diff Method Automated Method     % Neutrophils 64.5 %    % Lymphocytes 24.1 %    % Monocytes 9.5 %    % Eosinophils 1.3 %    % Basophils 0.4 %    % Immature Granulocytes 0.2 %    Absolute Neutrophil 3.6 1.6 - 8.3 10e9/L    Absolute Lymphocytes 1.3 0.8 - 5.3 10e9/L    Absolute Monocytes 0.5 0.0 - 1.3 10e9/L    Absolute Eosinophils 0.1 0.0 - 0.7 10e9/L    Absolute Basophils 0.0 0.0 - 0.2 10e9/L    Abs Immature Granulocytes 0.0 0 - 0.4 10e9/L   Folic Acid     Status: None   Result Value Ref Range    Folate 14.7 >5.21 ng/mL   Vitamin B12     Status: None   Result Value Ref Range    Vitamin B12 344 180 - 914 pg/mL   Results for orders placed or performed in visit on 05/13/21   INR point of care     Status: Abnormal   Result Value Ref Range    INR Protime 2.1 (A) 0.86 - 1.14      ASSESSMENT/Plan :    Angel was seen today for wellness visit.    Diagnoses and all orders for this visit:    Chronic atrial fibrillation (H)  -     CBC with platelets differential; Future  -     CBC with platelets differential    Long term current use of anticoagulant therapy  -     CBC with platelets differential; Future  -     CBC  with platelets differential    Essential hypertension  -     Comprehensive metabolic panel; Future  -     Comprehensive metabolic panel    Pulmonary hypertension (H)    Prediabetes  -     Comprehensive metabolic panel; Future  -     Lipid Profile; Future  -     Hemoglobin A1c; Future  -     Thyrotropin GH; Future  -     Thyrotropin GH  -     Hemoglobin A1c  -     Lipid Profile  -     Comprehensive metabolic panel    Stage 3a chronic kidney disease  -     CBC with platelets differential; Future  -     Comprehensive metabolic panel; Future  -     Comprehensive metabolic panel  -     CBC with platelets differential    Obstructive sleep apnea  -     CBC with platelets differential; Future  -     CBC with platelets differential    Arteriosclerosis of coronary artery  -     Comprehensive metabolic panel; Future  -     Lipid Profile; Future  -     Lipid Profile  -     Comprehensive metabolic panel    Encounter for Medicare annual wellness exam    H/O total hip arthroplasty, right  -     XR Pelvis and Hip Right 1 View; Future    Pleural effusion, right  -     XR Chest 2 Views; Future    Prostate disorder  -     Prostate Specific Antigen GH; Future  -     Prostate Specific Antigen GH    Special screening for malignant neoplasms, colon  -     ZELALEM(EXACT SCIENCES)    Macrocytosis  -     Vitamin B12; Future  -     Folic Acid; Future  -     Folic Acid  -     Vitamin B12      Will notify of lab results when available.  Reviewed labs that were back with him in the office.  Discussed diet, exercise and healthy lifestyle changes. Continue current medications.  Cologsamuel sent.  Discussed Shingrix.    His PSA is reassuring as it has dropped.    We will make sure his hip x-ray is forwarded to his orthopedist.    Chest x-ray indicates he has this persistent abnormality and CT with contrast has been recommended by the radiologist and that is ordered.  Patient notified via Spotie message.    A total of 40 minutes was spent with the  patient, reviewing records, tests, ordering medications, tests or procedures and documenting clinical information in the EHR in addition to Medicare Wellness.     Donte Mckeno MD

## 2021-05-13 ENCOUNTER — OFFICE VISIT (OUTPATIENT)
Dept: FAMILY MEDICINE | Facility: OTHER | Age: 76
End: 2021-05-13
Attending: FAMILY MEDICINE
Payer: MEDICARE

## 2021-05-13 ENCOUNTER — TELEPHONE (OUTPATIENT)
Dept: FAMILY MEDICINE | Facility: OTHER | Age: 76
End: 2021-05-13

## 2021-05-13 ENCOUNTER — HOSPITAL ENCOUNTER (OUTPATIENT)
Dept: GENERAL RADIOLOGY | Facility: OTHER | Age: 76
End: 2021-05-13
Attending: FAMILY MEDICINE
Payer: MEDICARE

## 2021-05-13 ENCOUNTER — ANTICOAGULATION THERAPY VISIT (OUTPATIENT)
Dept: ANTICOAGULATION | Facility: OTHER | Age: 76
End: 2021-05-13
Attending: FAMILY MEDICINE
Payer: MEDICARE

## 2021-05-13 VITALS
DIASTOLIC BLOOD PRESSURE: 70 MMHG | WEIGHT: 206 LBS | TEMPERATURE: 96.4 F | BODY MASS INDEX: 31.22 KG/M2 | HEIGHT: 68 IN | OXYGEN SATURATION: 96 % | RESPIRATION RATE: 18 BRPM | SYSTOLIC BLOOD PRESSURE: 122 MMHG | HEART RATE: 63 BPM

## 2021-05-13 DIAGNOSIS — I27.20 PULMONARY HYPERTENSION (H): ICD-10-CM

## 2021-05-13 DIAGNOSIS — J90 PLEURAL EFFUSION, RIGHT: ICD-10-CM

## 2021-05-13 DIAGNOSIS — I48.20 CHRONIC ATRIAL FIBRILLATION (H): ICD-10-CM

## 2021-05-13 DIAGNOSIS — Z79.01 LONG TERM CURRENT USE OF ANTICOAGULANT THERAPY: ICD-10-CM

## 2021-05-13 DIAGNOSIS — Z79.01 ANTICOAGULATION MONITORING, INR RANGE 2-3: ICD-10-CM

## 2021-05-13 DIAGNOSIS — Z96.641 H/O TOTAL HIP ARTHROPLASTY, RIGHT: ICD-10-CM

## 2021-05-13 DIAGNOSIS — Z12.11 SPECIAL SCREENING FOR MALIGNANT NEOPLASMS, COLON: ICD-10-CM

## 2021-05-13 DIAGNOSIS — N42.9 PROSTATE DISORDER: ICD-10-CM

## 2021-05-13 DIAGNOSIS — I48.20 CHRONIC ATRIAL FIBRILLATION (H): Primary | ICD-10-CM

## 2021-05-13 DIAGNOSIS — D75.89 MACROCYTOSIS: ICD-10-CM

## 2021-05-13 DIAGNOSIS — I25.10 ARTERIOSCLEROSIS OF CORONARY ARTERY: ICD-10-CM

## 2021-05-13 DIAGNOSIS — N18.31 STAGE 3A CHRONIC KIDNEY DISEASE (H): ICD-10-CM

## 2021-05-13 DIAGNOSIS — R73.03 PREDIABETES: ICD-10-CM

## 2021-05-13 DIAGNOSIS — Z00.00 ENCOUNTER FOR MEDICARE ANNUAL WELLNESS EXAM: Primary | ICD-10-CM

## 2021-05-13 DIAGNOSIS — I10 ESSENTIAL HYPERTENSION: ICD-10-CM

## 2021-05-13 DIAGNOSIS — G47.33 OBSTRUCTIVE SLEEP APNEA: ICD-10-CM

## 2021-05-13 LAB
ALBUMIN SERPL-MCNC: 3.9 G/DL (ref 3.5–5.7)
ALP SERPL-CCNC: 75 U/L (ref 34–104)
ALT SERPL W P-5'-P-CCNC: 9 U/L (ref 7–52)
ANION GAP SERPL CALCULATED.3IONS-SCNC: 6 MMOL/L (ref 3–14)
AST SERPL W P-5'-P-CCNC: 17 U/L (ref 13–39)
BASOPHILS # BLD AUTO: 0 10E9/L (ref 0–0.2)
BASOPHILS NFR BLD AUTO: 0.4 %
BILIRUB SERPL-MCNC: 0.9 MG/DL (ref 0.3–1)
BUN SERPL-MCNC: 30 MG/DL (ref 7–25)
CALCIUM SERPL-MCNC: 9.5 MG/DL (ref 8.6–10.3)
CHLORIDE SERPL-SCNC: 103 MMOL/L (ref 98–107)
CHOLEST SERPL-MCNC: 123 MG/DL
CO2 SERPL-SCNC: 32 MMOL/L (ref 21–31)
CREAT SERPL-MCNC: 1.34 MG/DL (ref 0.7–1.3)
DIFFERENTIAL METHOD BLD: ABNORMAL
EOSINOPHIL # BLD AUTO: 0.1 10E9/L (ref 0–0.7)
EOSINOPHIL NFR BLD AUTO: 1.3 %
ERYTHROCYTE [DISTWIDTH] IN BLOOD BY AUTOMATED COUNT: 13.3 % (ref 10–15)
FOLATE SERPL-MCNC: 14.7 NG/ML
GFR SERPL CREATININE-BSD FRML MDRD: 52 ML/MIN/{1.73_M2}
GLUCOSE SERPL-MCNC: 117 MG/DL (ref 70–105)
HBA1C MFR BLD: 5.6 % (ref 4–6)
HCT VFR BLD AUTO: 37.2 % (ref 40–53)
HDLC SERPL-MCNC: 53 MG/DL (ref 23–92)
HGB BLD-MCNC: 12.3 G/DL (ref 13.3–17.7)
IMM GRANULOCYTES # BLD: 0 10E9/L (ref 0–0.4)
IMM GRANULOCYTES NFR BLD: 0.2 %
INR POINT OF CARE: 2.1 (ref 0.86–1.14)
LDLC SERPL CALC-MCNC: 57 MG/DL
LYMPHOCYTES # BLD AUTO: 1.3 10E9/L (ref 0.8–5.3)
LYMPHOCYTES NFR BLD AUTO: 24.1 %
MCH RBC QN AUTO: 35.8 PG (ref 26.5–33)
MCHC RBC AUTO-ENTMCNC: 33.1 G/DL (ref 31.5–36.5)
MCV RBC AUTO: 108 FL (ref 78–100)
MONOCYTES # BLD AUTO: 0.5 10E9/L (ref 0–1.3)
MONOCYTES NFR BLD AUTO: 9.5 %
NEUTROPHILS # BLD AUTO: 3.6 10E9/L (ref 1.6–8.3)
NEUTROPHILS NFR BLD AUTO: 64.5 %
NONHDLC SERPL-MCNC: 70 MG/DL
PLATELET # BLD AUTO: 154 10E9/L (ref 150–450)
POTASSIUM SERPL-SCNC: 4.4 MMOL/L (ref 3.5–5.1)
PROT SERPL-MCNC: 7.3 G/DL (ref 6.4–8.9)
PSA SERPL-MCNC: 3.51 NG/ML
RBC # BLD AUTO: 3.44 10E12/L (ref 4.4–5.9)
SODIUM SERPL-SCNC: 141 MMOL/L (ref 134–144)
TRIGL SERPL-MCNC: 65 MG/DL
TSH SERPL DL<=0.05 MIU/L-ACNC: 2.46 IU/ML (ref 0.34–5.6)
VIT B12 SERPL-MCNC: 344 PG/ML (ref 180–914)
WBC # BLD AUTO: 5.6 10E9/L (ref 4–11)

## 2021-05-13 PROCEDURE — 84443 ASSAY THYROID STIM HORMONE: CPT | Mod: ZL | Performed by: FAMILY MEDICINE

## 2021-05-13 PROCEDURE — G0463 HOSPITAL OUTPT CLINIC VISIT: HCPCS

## 2021-05-13 PROCEDURE — 71046 X-RAY EXAM CHEST 2 VIEWS: CPT

## 2021-05-13 PROCEDURE — G0463 HOSPITAL OUTPT CLINIC VISIT: HCPCS | Mod: 25

## 2021-05-13 PROCEDURE — G0439 PPPS, SUBSEQ VISIT: HCPCS | Mod: 25 | Performed by: FAMILY MEDICINE

## 2021-05-13 PROCEDURE — 83036 HEMOGLOBIN GLYCOSYLATED A1C: CPT | Mod: ZL | Performed by: FAMILY MEDICINE

## 2021-05-13 PROCEDURE — 82607 VITAMIN B-12: CPT | Mod: ZL | Performed by: FAMILY MEDICINE

## 2021-05-13 PROCEDURE — 36416 COLLJ CAPILLARY BLOOD SPEC: CPT | Mod: ZL

## 2021-05-13 PROCEDURE — 73502 X-RAY EXAM HIP UNI 2-3 VIEWS: CPT

## 2021-05-13 PROCEDURE — 80061 LIPID PANEL: CPT | Mod: ZL | Performed by: FAMILY MEDICINE

## 2021-05-13 PROCEDURE — 85025 COMPLETE CBC W/AUTO DIFF WBC: CPT | Mod: ZL | Performed by: FAMILY MEDICINE

## 2021-05-13 PROCEDURE — 84153 ASSAY OF PSA TOTAL: CPT | Mod: ZL | Performed by: FAMILY MEDICINE

## 2021-05-13 PROCEDURE — 99215 OFFICE O/P EST HI 40 MIN: CPT | Performed by: FAMILY MEDICINE

## 2021-05-13 PROCEDURE — 36415 COLL VENOUS BLD VENIPUNCTURE: CPT | Mod: ZL | Performed by: FAMILY MEDICINE

## 2021-05-13 PROCEDURE — 80053 COMPREHEN METABOLIC PANEL: CPT | Mod: ZL | Performed by: FAMILY MEDICINE

## 2021-05-13 PROCEDURE — 82746 ASSAY OF FOLIC ACID SERUM: CPT | Mod: ZL | Performed by: FAMILY MEDICINE

## 2021-05-13 ASSESSMENT — PAIN SCALES - GENERAL: PAINLEVEL: NO PAIN (0)

## 2021-05-13 ASSESSMENT — MIFFLIN-ST. JEOR: SCORE: 1638.91

## 2021-05-13 NOTE — PROGRESS NOTES
"SUBJECTIVE:   Angel Garrett is a 75 year old male who presents for Preventive Visit.      Patient has been advised of split billing requirements and indicates understanding: No  Are you in the first 12 months of your Medicare Part B coverage?  No    Physical Health:    In general, how would you rate your overall physical health? fair    Outside of work, how many days during the week do you exercise? 1 day/week    Outside of work, approximately how many minutes a day do you exercise?less than 15 minutes    If you drink alcohol do you typically have >3 drinks per day or >7 drinks per week? No    Do you usually eat at least 4 servings of fruit and vegetables a day, include whole grains & fiber and avoid regularly eating high fat or \"junk\" foods? NO    Do you have any problems taking medications regularly?  No    Do you have any side effects from medications? not applicable    Needs assistance for the following daily activities: no assistance needed    Which of the following safety concerns are present in your home?  none identified     Hearing impairment: No    In the past 6 months, have you been bothered by leaking of urine? no    Mental Health:    In general, how would you rate your overall mental or emotional health? excellent  PHQ-2 Score: 0    Do you feel safe in your environment? Yes    Have you ever done Advance Care Planning? (For example, a Health Directive, POLST, or a discussion with a medical provider or your loved ones about your wishes): Yes, advance care planning is on file.    Additional concerns to address?  No    Fall risk:     click delete button to remove this line now  Cognitive Screenin) Repeat 3 items (Leader, Season, Table)    2) Clock draw: NORMAL  3) 3 item recall: Recalls 1 object   Results: NORMAL clock, 1-2 items recalled: COGNITIVE IMPAIRMENT LESS LIKELY    Mini-CogTM Copyright LINDSAY [place]. Licensed by the author for use in [facility]; reprinted with permission " "(mari@[facility]). All rights reserved.      Do you have sleep apnea, excessive snoring or daytime drowsiness?: yes        Reviewed and updated as needed this visit by clinical staff  Tobacco   Meds              Reviewed and updated as needed this visit by Provider                Social History     Tobacco Use     Smoking status: Former Smoker     Quit date: 1984     Years since quittin.6     Smokeless tobacco: Never Used   Substance Use Topics     Alcohol use: Yes     Alcohol/week: 5.8 standard drinks     Comment: Alcoholic Drinks/day: 1 drinks a day                           Current providers sharing in care for this patient include:   Patient Care Team:  Donte Mckeon MD as PCP - General (Family Practice)  Donte Mckeon MD as Assigned PCP    The following health maintenance items are reviewed in Epic and correct as of today:  Health Maintenance   Topic Date Due     HEPATITIS C SCREENING  Never done     ZOSTER IMMUNIZATION (1 of 2) Never done     COLORECTAL CANCER SCREENING  2019     MEDICARE ANNUAL WELLNESS VISIT  12/10/2020     FALL RISK ASSESSMENT  2021     DTAP/TDAP/TD IMMUNIZATION (2 - Td) 2022     LIPID  12/10/2024     ADVANCE CARE PLANNING  2026     PHQ-2  Completed     INFLUENZA VACCINE  Completed     Pneumococcal Vaccine: 65+ Years  Completed     COVID-19 Vaccine  Completed     Pneumococcal Vaccine: Pediatrics (0 to 5 Years) and At-Risk Patients (6 to 64 Years)  Aged Out     IPV IMMUNIZATION  Aged Out     MENINGITIS IMMUNIZATION  Aged Out     HEPATITIS B IMMUNIZATION  Aged Out     Labs reviewed in EPIC      ROS:  See below    OBJECTIVE:   /70 (BP Location: Right arm, Patient Position: Sitting, Cuff Size: Adult Large)   Pulse 63   Temp 96.4  F (35.8  C) (Tympanic)   Resp 18   Ht 1.727 m (5' 8\")   Wt 93.4 kg (206 lb)   SpO2 96%   BMI 31.32 kg/m   Estimated body mass index is 31.32 kg/m  as calculated from the following:    Height as of this encounter: " "1.727 m (5' 8\").    Weight as of this encounter: 93.4 kg (206 lb).  EXAM:   See below    Diagnostic Test Results:  Labs reviewed in Epic    ASSESSMENT / PLAN:   Angel was seen today for wellness visit.    Diagnoses and all orders for this visit:    Encounter for Medicare annual wellness exam    Chronic atrial fibrillation (H)  -     CBC with platelets differential; Future  -     CBC with platelets differential    Long term current use of anticoagulant therapy  -     CBC with platelets differential; Future  -     CBC with platelets differential    Essential hypertension  -     Comprehensive metabolic panel; Future  -     Comprehensive metabolic panel    Pulmonary hypertension (H)    Prediabetes  -     Comprehensive metabolic panel; Future  -     Lipid Profile; Future  -     Hemoglobin A1c; Future  -     Thyrotropin GH; Future  -     Thyrotropin GH  -     Hemoglobin A1c  -     Lipid Profile  -     Comprehensive metabolic panel    Stage 3a chronic kidney disease  -     CBC with platelets differential; Future  -     Comprehensive metabolic panel; Future  -     Comprehensive metabolic panel  -     CBC with platelets differential    Obstructive sleep apnea  -     CBC with platelets differential; Future  -     CBC with platelets differential    Arteriosclerosis of coronary artery  -     Comprehensive metabolic panel; Future  -     Lipid Profile; Future  -     Lipid Profile  -     Comprehensive metabolic panel    H/O total hip arthroplasty, right  -     XR Pelvis and Hip Right 1 View; Future    Pleural effusion, right  -     XR Chest 2 Views; Future  -     CT Chest w Contrast; Future    Prostate disorder  -     Prostate Specific Antigen GH; Future  -     Prostate Specific Antigen GH    Special screening for malignant neoplasms, colon  -     COLOGUARD(EXACT SCIENCES)    Macrocytosis  -     Vitamin B12; Future  -     Folic Acid; Future  -     Folic Acid  -     Vitamin B12  -     Folic Acid; Future        Patient has been " "advised of split billing requirements and indicates understanding: Yes    COUNSELING:  Reviewed preventive health counseling, as reflected in patient instructions       Regular exercise       Healthy diet/nutrition       Colon cancer screening       Prostate cancer screening    Estimated body mass index is 31.32 kg/m  as calculated from the following:    Height as of this encounter: 1.727 m (5' 8\").    Weight as of this encounter: 93.4 kg (206 lb).    Weight management plan: Discussed healthy diet and exercise guidelines    He reports that he quit smoking about 36 years ago. He has never used smokeless tobacco.    Appropriate preventive services were discussed with this patient, including applicable screening as appropriate for cardiovascular disease, diabetes, osteopenia/osteoporosis, and glaucoma.  As appropriate for age/gender, discussed screening for colorectal cancer, prostate cancer, breast cancer, and cervical cancer. Checklist reviewing preventive services available has been given to the patient.    Reviewed patients plan of care and provided an AVS. The Basic Care Plan (routine screening as documented in Health Maintenance) for Angel meets the Care Plan requirement. This Care Plan has been established and reviewed with the Patient.    Counseling Resources:  ATP IV Guidelines  Pooled Cohorts Equation Calculator  Breast Cancer Risk Calculator  BRCA-Related Cancer Risk Assessment: FHS-7 Tool  FRAX Risk Assessment  ICSI Preventive Guidelines  Dietary Guidelines for Americans, 2010  USDA's MyPlate  ASA Prophylaxis  Lung CA Screening    Donte Mckeon MD  [facility] AND \[facility]\""  "

## 2021-05-13 NOTE — PATIENT INSTRUCTIONS
Patient Education   Personalized Prevention Plan  You are due for the preventive services outlined below.  Your care team is available to assist you in scheduling these services.  If you have already completed any of these items, please share that information with your care team to update in your medical record.  Health Maintenance Due   Topic Date Due     Hepatitis C Screening  Never done     Zoster (Shingles) Vaccine (1 of 2) Never done     Colorectal Cancer Screening  11/18/2019

## 2021-05-13 NOTE — TELEPHONE ENCOUNTER
Patient would like to go to 12 week recheck on his INR. He has been on the same dose of warfarin since before June of 2019. Would this be ok with you to put his INR recheck out for 12 weeks? Saba Honeycutt RN    5/13/2021  9:33 AM

## 2021-05-13 NOTE — PROGRESS NOTES
ANTICOAGULATION FOLLOW-UP CLINIC VISIT    Patient Name:  Angel Garrett  Date:  2021  Contact Type:  Face to Face    SUBJECTIVE:  Patient Findings         Clinical Outcomes     Negatives:  Major bleeding event, Thromboembolic event, Anticoagulation-related hospital admission, Anticoagulation-related ED visit, Anticoagulation-related fatality           OBJECTIVE    Recent labs: (last 7 days)     21   INR 2.1*       ASSESSMENT / PLAN  INR assessment THER    Recheck INR In: 12 WEEKS    INR Location Clinic      Anticoagulation Summary  As of 2021    INR goal:  2.0-3.0   TTR:  89.9 % (7.8 mo)   INR used for dosin.1 (2021)   Warfarin maintenance plan:  9 mg (3 mg x 3) every Sat; 6 mg (3 mg x 2) all other days   Full warfarin instructions:  9 mg every Sat; 6 mg all other days   Weekly warfarin total:  45 mg   No change documented:  Saba Honeycutt RN   Plan last modified:  Saba Honeycutt RN (2020)   Next INR check:  2021   Priority:  Maintenance   Target end date:  Indefinite    Indications    Chronic atrial fibrillation (H) [I48.20]             Anticoagulation Episode Summary     INR check location:  Anticoagulation Clinic    Preferred lab:  [facility]    Send INR reminders to:  [facility]    Comments:        Anticoagulation Care Providers     Provider Role Specialty Phone number    Donte Mckeon MD [facility] [phone]            See the Encounter Report to view Anticoagulation Flowsheet and Dosing Calendar (Go to Encounters tab in chart review, and find the Anticoagulation Therapy Visit)        Saba Honeycutt RN

## 2021-06-04 ENCOUNTER — TELEPHONE (OUTPATIENT)
Dept: FAMILY MEDICINE | Facility: OTHER | Age: 76
End: 2021-06-04

## 2021-06-04 NOTE — TELEPHONE ENCOUNTER
[facility]-pt having a dental procedure-extraction. Should he stop warfarin prior? Please call. Thank you.  Lesly Mcbride

## 2021-06-07 NOTE — TELEPHONE ENCOUNTER
After proper verification writer spoke with patient regarding below message. He verbalized understanding.  More Fletcher LPN on 6/7/2021 at 11:36 AM

## 2021-06-07 NOTE — TELEPHONE ENCOUNTER
Ok to stop 3 days prior, then resume on the night of the procedure.  Donte Mckeon MD on 6/7/2021 at 11:17 AM

## 2021-06-10 DIAGNOSIS — Z79.01 LONG TERM CURRENT USE OF ANTICOAGULANT THERAPY: ICD-10-CM

## 2021-06-10 DIAGNOSIS — I48.20 CHRONIC ATRIAL FIBRILLATION (H): ICD-10-CM

## 2021-06-10 RX ORDER — WARFARIN SODIUM 3 MG/1
TABLET ORAL
Qty: 200 TABLET | Refills: 1 | Status: SHIPPED | OUTPATIENT
Start: 2021-06-10 | End: 2021-12-08

## 2021-06-10 NOTE — TELEPHONE ENCOUNTER
"Requested Prescriptions   Pending Prescriptions Disp Refills     warfarin ANTICOAGULANT (COUMADIN) 3 MG tablet [Pharmacy Med Name: WARFARIN SODIUM 3 MG TABLET] 200 tablet 1     Sig: TAKE 9 MG X 1 DAY AND 6 MG X 6 DAYS/WEEK. [facility]       Vitamin K Antagonists Failed - 6/10/2021 12:26 AM        Failed - INR is within goal in the past 6 weeks     Confirm INR is within goal in the past 6 weeks.     Recent Labs   Lab Test 05/13/21   INR 2.1*                       Passed - Recent (12 mo) or future (30 days) visit within the authorizing provider's specialty     Patient has had an office visit with the authorizing provider or a provider within the authorizing providers department within the previous 12 mos or has a future within next 30 days. See \"Patient Info\" tab in inbasket, or \"Choose Columns\" in Meds & Orders section of the refill encounter.              Passed - Medication is active on med list        Passed - Patient is 18 years of age or older           Prescription refilled per RN MedicationRefill Policy.................... Saba Honeycutt RN ....................  6/10/2021   10:58 AM      "

## 2021-06-11 ENCOUNTER — TELEPHONE (OUTPATIENT)
Dept: FAMILY MEDICINE | Facility: OTHER | Age: 76
End: 2021-06-11

## 2021-06-11 DIAGNOSIS — Z01.812 PRE-PROCEDURE LAB EXAM: Primary | ICD-10-CM

## 2021-06-11 NOTE — TELEPHONE ENCOUNTER
Patient is having a CT scan done 6/14 but needs a current creatinine. Please place lab order.      Kimberly Jones on 6/11/2021 at 12:20 PM

## 2021-06-11 NOTE — TELEPHONE ENCOUNTER
Patient has lab scheduled 6/14.    Creatinine lab olivia'd up.  Chary Ocampo LPN 6/11/2021   12:26 PM

## 2021-06-14 ENCOUNTER — HOSPITAL ENCOUNTER (OUTPATIENT)
Dept: CT IMAGING | Facility: OTHER | Age: 76
End: 2021-06-14
Attending: FAMILY MEDICINE
Payer: MEDICARE

## 2021-06-14 ENCOUNTER — ANTICOAGULATION THERAPY VISIT (OUTPATIENT)
Dept: ANTICOAGULATION | Facility: OTHER | Age: 76
End: 2021-06-14
Attending: FAMILY MEDICINE
Payer: MEDICARE

## 2021-06-14 DIAGNOSIS — Z01.812 PRE-PROCEDURE LAB EXAM: ICD-10-CM

## 2021-06-14 DIAGNOSIS — J90 LOCULATED PLEURAL EFFUSION: ICD-10-CM

## 2021-06-14 DIAGNOSIS — I27.20 PULMONARY HYPERTENSION (H): Primary | ICD-10-CM

## 2021-06-14 DIAGNOSIS — I48.20 CHRONIC ATRIAL FIBRILLATION (H): ICD-10-CM

## 2021-06-14 DIAGNOSIS — Z79.01 ANTICOAGULATION MONITORING, INR RANGE 2-3: ICD-10-CM

## 2021-06-14 DIAGNOSIS — J90 PLEURAL EFFUSION, RIGHT: ICD-10-CM

## 2021-06-14 DIAGNOSIS — R93.89 ABNORMAL CHEST CT: ICD-10-CM

## 2021-06-14 LAB
CREAT SERPL-MCNC: 1.39 MG/DL (ref 0.7–1.3)
GFR SERPL CREATININE-BSD FRML MDRD: 50 ML/MIN/{1.73_M2}
INR POINT OF CARE: 1.3 (ref 0.86–1.14)

## 2021-06-14 PROCEDURE — 71260 CT THORAX DX C+: CPT

## 2021-06-14 PROCEDURE — 36416 COLLJ CAPILLARY BLOOD SPEC: CPT | Mod: ZL

## 2021-06-14 PROCEDURE — 82565 ASSAY OF CREATININE: CPT | Mod: ZL | Performed by: FAMILY MEDICINE

## 2021-06-14 PROCEDURE — 250N000011 HC RX IP 250 OP 636: Performed by: FAMILY MEDICINE

## 2021-06-14 PROCEDURE — 36415 COLL VENOUS BLD VENIPUNCTURE: CPT | Mod: ZL | Performed by: FAMILY MEDICINE

## 2021-06-14 RX ORDER — IOPAMIDOL 755 MG/ML
100 INJECTION, SOLUTION INTRAVASCULAR ONCE
Status: COMPLETED | OUTPATIENT
Start: 2021-06-14 | End: 2021-06-14

## 2021-06-14 RX ADMIN — IOPAMIDOL 100 ML: 755 INJECTION, SOLUTION INTRAVENOUS at 14:17

## 2021-06-14 NOTE — PROGRESS NOTES
ANTICOAGULATION FOLLOW-UP CLINIC VISIT    Patient Name:  Angel Garrett  Date:  2021  Contact Type:  Face to Face    SUBJECTIVE:  Patient Findings     Positives:  Upcoming dental procedure (having a tooth pulled and dental procedures)        Clinical Outcomes     Negatives:  Major bleeding event, Thromboembolic event, Anticoagulation-related hospital admission, Anticoagulation-related ED visit, Anticoagulation-related fatality           OBJECTIVE    Recent labs: (last 7 days)     21   INR 1.3*       ASSESSMENT / PLAN  INR assessment THER    Recheck INR In: 2 WEEKS    INR Location Clinic      Anticoagulation Summary  As of 2021    INR goal:  2.0-3.0   TTR:  82.2 % (7.8 mo)   INR used for dosin.3 (2021)   Warfarin maintenance plan:  9 mg (3 mg x 3) every Sat; 6 mg (3 mg x 2) all other days   Full warfarin instructions:  : Hold; 6/15: 9 mg; Otherwise 9 mg every Sat; 6 mg all other days   Weekly warfarin total:  45 mg   Plan last modified:  Saba Hoenycutt RN (2020)   Next INR check:  2021   Priority:  Maintenance   Target end date:  Indefinite    Indications    Chronic atrial fibrillation (H) [I48.20]  Anticoagulation monitoring  INR range 2-3 [Z79.01]             Anticoagulation Episode Summary     INR check location:  Anticoagulation Clinic    Preferred lab:  [facility]    Send INR reminders to:  [facility]    Comments:        Anticoagulation Care Providers     Provider Role Specialty Phone number    Donte Mckeon MD Referring Family Medicine [phone]            See the Encounter Report to view Anticoagulation Flowsheet and Dosing Calendar (Go to Encounters tab in chart review, and find the Anticoagulation Therapy Visit)        Saba Honeycutt, RN

## 2021-06-28 ENCOUNTER — ANTICOAGULATION THERAPY VISIT (OUTPATIENT)
Dept: ANTICOAGULATION | Facility: OTHER | Age: 76
End: 2021-06-28
Attending: FAMILY MEDICINE
Payer: MEDICARE

## 2021-06-28 DIAGNOSIS — Z79.01 ANTICOAGULATION MONITORING, INR RANGE 2-3: ICD-10-CM

## 2021-06-28 DIAGNOSIS — I48.20 CHRONIC ATRIAL FIBRILLATION (H): ICD-10-CM

## 2021-06-28 LAB — INR POINT OF CARE: 2 (ref 0.86–1.14)

## 2021-06-28 PROCEDURE — 36416 COLLJ CAPILLARY BLOOD SPEC: CPT | Mod: ZL

## 2021-06-28 NOTE — PROGRESS NOTES
"ANTICOAGULATION FOLLOW-UP CLINIC VISIT    Patient Name:  Angel Garrett  Date:  2021  Contact Type:  Face to Face    SUBJECTIVE:  Patient Findings     Comments:  Recommended patient return in 2-3 weeks for a recheck, \" I have been so steady on this dose for a while. I will come back in 6 weeks\".         Clinical Outcomes     Negatives:  Major bleeding event, Thromboembolic event, Anticoagulation-related hospital admission, Anticoagulation-related ED visit, Anticoagulation-related fatality    Comments:  Recommended patient return in 2-3 weeks for a recheck, \" I have been so steady on this dose for a while. I will come back in 6 weeks\".            OBJECTIVE    Recent labs: (last 7 days)     21   INR 2.0*       ASSESSMENT / PLAN  INR assessment THER    Recheck INR In: 6 WEEKS    INR Location Clinic      Anticoagulation Summary  As of 2021    INR goal:  2.0-3.0   TTR:  82.0 % (7.8 mo)   INR used for dosin.0 (2021)   Warfarin maintenance plan:  9 mg (3 mg x 3) every Sat; 6 mg (3 mg x 2) all other days   Full warfarin instructions:  9 mg every Sat; 6 mg all other days   Weekly warfarin total:  45 mg   No change documented:  Taniya Christiansen RN   Plan last modified:  Saba Honeycutt RN (2020)   Next INR check:  2021   Priority:  Maintenance   Target end date:  Indefinite    Indications    Chronic atrial fibrillation (H) [I48.20]  Anticoagulation monitoring  INR range 2-3 [Z79.01]             Anticoagulation Episode Summary     INR check location:  Anticoagulation Clinic    Preferred lab:  [facility]    Send INR reminders to:  [facility]    Comments:        Anticoagulation Care Providers     Provider Role Specialty Phone number    Donte Mckeon MD Referring Family Medicine [phone]            See the Encounter Report to view Anticoagulation Flowsheet and Dosing Calendar (Go to Encounters tab in chart review, and find the Anticoagulation Therapy " Visit)        Taniya Christiansen RN

## 2021-08-12 ENCOUNTER — ANTICOAGULATION THERAPY VISIT (OUTPATIENT)
Dept: ANTICOAGULATION | Facility: OTHER | Age: 76
End: 2021-08-12
Attending: FAMILY MEDICINE
Payer: MEDICARE

## 2021-08-12 DIAGNOSIS — Z79.01 ANTICOAGULATION MONITORING, INR RANGE 2-3: ICD-10-CM

## 2021-08-12 DIAGNOSIS — I48.20 CHRONIC ATRIAL FIBRILLATION (H): Primary | ICD-10-CM

## 2021-08-12 LAB — INR POINT OF CARE: 2.3 (ref 0.9–1.1)

## 2021-08-12 PROCEDURE — 36416 COLLJ CAPILLARY BLOOD SPEC: CPT | Mod: ZL

## 2021-08-12 NOTE — PROGRESS NOTES
ANTICOAGULATION FOLLOW-UP CLINIC VISIT    Patient Name:  Angel Garrett  Date:  2021  Contact Type:  Face to Face    SUBJECTIVE:  Patient Findings         Clinical Outcomes     Negatives:  Major bleeding event, Thromboembolic event, Anticoagulation-related hospital admission, Anticoagulation-related ED visit, Anticoagulation-related fatality           OBJECTIVE    Recent labs: (last 7 days)     21  1019   INR 2.3*       ASSESSMENT / PLAN  INR assessment THER    Recheck INR In: 6 WEEKS    INR Location Clinic      Anticoagulation Summary  As of 2021    INR goal:  2.0-3.0   TTR:  84.0 % (8.7 mo)   INR used for dosin.3 (2021)   Warfarin maintenance plan:  9 mg (3 mg x 3) every Sat; 6 mg (3 mg x 2) all other days   Full warfarin instructions:  9 mg every Sat; 6 mg all other days   Weekly warfarin total:  45 mg   No change documented:  Caridad Verma RN   Plan last modified:  Saba Honeycutt RN (2020)   Next INR check:  2021   Priority:  Maintenance   Target end date:  Indefinite    Indications    Chronic atrial fibrillation (H) [I48.20]  Anticoagulation monitoring  INR range 2-3 [Z79.01]             Anticoagulation Episode Summary     INR check location:  Anticoagulation Clinic    Preferred lab:  [facility]    Send INR reminders to:  [facility]    Comments:        Anticoagulation Care Providers     Provider Role Specialty Phone number    Donte Mckeon MD Referring Family Medicine [phone]            See the Encounter Report to view Anticoagulation Flowsheet and Dosing Calendar (Go to Encounters tab in chart review, and find the Anticoagulation Therapy Visit)        Caridad Verma RN

## 2021-08-26 ENCOUNTER — TELEPHONE (OUTPATIENT)
Dept: FAMILY MEDICINE | Facility: OTHER | Age: 76
End: 2021-08-26

## 2021-08-26 NOTE — TELEPHONE ENCOUNTER
Patient has a swollen hand and it is very painful.  He just wants to talk to a nurse and ask what to do. Please call        Janice Danielle on 8/26/2021 at 12:44 PM

## 2021-08-27 ENCOUNTER — OFFICE VISIT (OUTPATIENT)
Dept: FAMILY MEDICINE | Facility: OTHER | Age: 76
End: 2021-08-27
Attending: NURSE PRACTITIONER
Payer: MEDICARE

## 2021-08-27 ENCOUNTER — HOSPITAL ENCOUNTER (OUTPATIENT)
Dept: GENERAL RADIOLOGY | Facility: OTHER | Age: 76
End: 2021-08-27
Attending: PHYSICIAN ASSISTANT
Payer: MEDICARE

## 2021-08-27 VITALS
BODY MASS INDEX: 31.08 KG/M2 | OXYGEN SATURATION: 97 % | HEART RATE: 50 BPM | RESPIRATION RATE: 18 BRPM | WEIGHT: 204.4 LBS | DIASTOLIC BLOOD PRESSURE: 62 MMHG | SYSTOLIC BLOOD PRESSURE: 136 MMHG | TEMPERATURE: 97.5 F

## 2021-08-27 DIAGNOSIS — M10.9 ACUTE GOUTY ARTHRITIS: Primary | ICD-10-CM

## 2021-08-27 DIAGNOSIS — M79.89 SWELLING OF RIGHT HAND: ICD-10-CM

## 2021-08-27 LAB
ALBUMIN SERPL-MCNC: 3.8 G/DL (ref 3.5–5.7)
ALP SERPL-CCNC: 81 U/L (ref 34–104)
ALT SERPL W P-5'-P-CCNC: 7 U/L (ref 7–52)
ANION GAP SERPL CALCULATED.3IONS-SCNC: 6 MMOL/L (ref 3–14)
AST SERPL W P-5'-P-CCNC: 17 U/L (ref 13–39)
BASOPHILS # BLD AUTO: 0 10E3/UL (ref 0–0.2)
BASOPHILS NFR BLD AUTO: 1 %
BILIRUB SERPL-MCNC: 1 MG/DL (ref 0.3–1)
BUN SERPL-MCNC: 28 MG/DL (ref 7–25)
CALCIUM SERPL-MCNC: 9.2 MG/DL (ref 8.6–10.3)
CHLORIDE BLD-SCNC: 101 MMOL/L (ref 98–107)
CO2 SERPL-SCNC: 31 MMOL/L (ref 21–31)
CREAT SERPL-MCNC: 1.42 MG/DL (ref 0.7–1.3)
CRP SERPL-MCNC: 15.2 MG/L
EOSINOPHIL # BLD AUTO: 0.1 10E3/UL (ref 0–0.7)
EOSINOPHIL NFR BLD AUTO: 2 %
ERYTHROCYTE [DISTWIDTH] IN BLOOD BY AUTOMATED COUNT: 13.2 % (ref 10–15)
ERYTHROCYTE [SEDIMENTATION RATE] IN BLOOD BY WESTERGREN METHOD: 44 MM/HR (ref 0–20)
GFR SERPL CREATININE-BSD FRML MDRD: 48 ML/MIN/1.73M2
GLUCOSE BLD-MCNC: 103 MG/DL (ref 70–105)
HCT VFR BLD AUTO: 38.1 % (ref 40–53)
HGB BLD-MCNC: 12.7 G/DL (ref 13.3–17.7)
IMM GRANULOCYTES # BLD: 0 10E3/UL
IMM GRANULOCYTES NFR BLD: 0 %
LYMPHOCYTES # BLD AUTO: 1.3 10E3/UL (ref 0.8–5.3)
LYMPHOCYTES NFR BLD AUTO: 23 %
MCH RBC QN AUTO: 35.9 PG (ref 26.5–33)
MCHC RBC AUTO-ENTMCNC: 33.3 G/DL (ref 31.5–36.5)
MCV RBC AUTO: 108 FL (ref 78–100)
MONOCYTES # BLD AUTO: 0.5 10E3/UL (ref 0–1.3)
MONOCYTES NFR BLD AUTO: 8 %
NEUTROPHILS # BLD AUTO: 3.9 10E3/UL (ref 1.6–8.3)
NEUTROPHILS NFR BLD AUTO: 66 %
NRBC # BLD AUTO: 0 10E3/UL
NRBC BLD AUTO-RTO: 0 /100
PLATELET # BLD AUTO: 159 10E3/UL (ref 150–450)
POTASSIUM BLD-SCNC: 3.9 MMOL/L (ref 3.5–5.1)
PROT SERPL-MCNC: 7.5 G/DL (ref 6.4–8.9)
RBC # BLD AUTO: 3.54 10E6/UL (ref 4.4–5.9)
SODIUM SERPL-SCNC: 138 MMOL/L (ref 134–144)
URATE SERPL-MCNC: 10.5 MG/DL (ref 4.4–7.6)
WBC # BLD AUTO: 5.8 10E3/UL (ref 4–11)

## 2021-08-27 PROCEDURE — 85652 RBC SED RATE AUTOMATED: CPT | Mod: ZL | Performed by: PHYSICIAN ASSISTANT

## 2021-08-27 PROCEDURE — 86140 C-REACTIVE PROTEIN: CPT | Mod: ZL | Performed by: PHYSICIAN ASSISTANT

## 2021-08-27 PROCEDURE — G0463 HOSPITAL OUTPT CLINIC VISIT: HCPCS

## 2021-08-27 PROCEDURE — 73130 X-RAY EXAM OF HAND: CPT | Mod: RT

## 2021-08-27 PROCEDURE — 84550 ASSAY OF BLOOD/URIC ACID: CPT | Mod: ZL | Performed by: PHYSICIAN ASSISTANT

## 2021-08-27 PROCEDURE — 80053 COMPREHEN METABOLIC PANEL: CPT | Mod: ZL | Performed by: PHYSICIAN ASSISTANT

## 2021-08-27 PROCEDURE — 36415 COLL VENOUS BLD VENIPUNCTURE: CPT | Mod: ZL | Performed by: PHYSICIAN ASSISTANT

## 2021-08-27 PROCEDURE — 85025 COMPLETE CBC W/AUTO DIFF WBC: CPT | Mod: ZL | Performed by: PHYSICIAN ASSISTANT

## 2021-08-27 PROCEDURE — 99215 OFFICE O/P EST HI 40 MIN: CPT | Performed by: PHYSICIAN ASSISTANT

## 2021-08-27 RX ORDER — PREDNISONE 20 MG/1
TABLET ORAL
Qty: 20 TABLET | Refills: 0 | Status: CANCELLED | OUTPATIENT
Start: 2021-08-27

## 2021-08-27 RX ORDER — PREDNISONE 20 MG/1
TABLET ORAL
Qty: 11 TABLET | Refills: 0 | Status: SHIPPED | OUTPATIENT
Start: 2021-08-27 | End: 2021-09-05

## 2021-08-27 ASSESSMENT — PAIN SCALES - GENERAL: PAINLEVEL: EXTREME PAIN (8)

## 2021-08-27 NOTE — PROGRESS NOTES
ASSESSMENT/PLAN:    I have reviewed the nursing notes.  I have reviewed the findings, diagnosis, plan and need for follow up with the patient.    1. Acute gouty arthritis  - predniSONE (DELTASONE) 20 MG tablet; Take 2 tablets (40 mg) by mouth daily for 3 days, THEN 1 tablet (20 mg) daily for 3 days, THEN 0.5 tablets (10 mg) daily for 3 days.  Dispense: 11 tablet; Refill: 0  -Vitals stable. Edema of dorsal right hand as described below. Labs consistent with acute gouty flare. Discussed the anatomy, pathophysiology and typical treatment plan of gout. Discussed that antiinflammatories can be first line, however, since patient is on Coumadin, we are avoiding this route. As symptoms present > 36 hours, per UTD, Colchicine is not preferred. We will place patient on Prednisone 40-10 taper, alternate heat and ice. Monitor for fevers, chills, worsening signs of infection. We also reviewed the gout diet for prevention of gout and this was outlined on patient's AVS in detail. Follow up with changing or worsening symptoms. Patient is in agreement and understanding of the above treatment plan. All questions and concerns were addressed and answered to patient's satisfaction. AVS reviewed with patient.     2. Swelling of right hand  - XR Hand Right G/E 3 Views  - Uric acid  - Comprehensive Metabolic Panel  - CRP inflammation  - Sedimentation Rate (ESR)  - CBC and Differential  -Ordered to assess for etiology for edema of right hand.     45 minutes on patient care.     Discussed warning signs/symptoms indicative of need to f/u    Follow up if symptoms persist or worsen or concerns    I explained my diagnostic considerations and recommendations to the patient, who voiced understanding and agreement with the treatment plan. All questions were answered. We discussed potential side effects of any prescribed or recommended therapies, as well as expectations for response to treatments.    Isabelle Mccann PA-C  8/27/2021  12:16  PM    HPI:    Angel Garrett is a 75 year old male  who presents to [facility] today for concerns of swelling of their right hand (1st and 2nd digit).     Edema HPI:  Acute/Chronic Onset: 5 days prior   No known scratches, injuries, bites  Pitting/Non Pitting: non pitting  Painful/Pain-free: painful  Bilateral/Unilateral: Right Upper Extremity  Symmetrical/Asymmetrical: asymmetrical  Skin/Pigment Changes: redness, warmth and swelling  Treatments Tried: Tylenol, ice  Current Medications: see above   Recent Travel: No  Recent Surgery/Immobilization: No  History of DVT: No  Chest Pain/SOB: No  History of Similar Symptoms: No  Comorbid Conditions:      Chronic Venous Dz/Venous Insufficiency: Yes     Pulmonary HTN: Yes     CHF: Yes: per patient     Injuries: No  Neurologic Symptoms:denies neurologic symptoms    History reviewed. No pertinent past medical history.  Past Surgical History:   Procedure Laterality Date     COLONOSCOPY  2009,Colonography     OTHER SURGICAL HISTORY      ,,OPEN CHOLECYSTECTOMY     OTHER SURGICAL HISTORY      ,32394,CARDIAC SURGERY,Pericardial patch, closure of ASD, repair of PFO and repair of anomalous pulmonary venous drainage.     OTHER SURGICAL HISTORY      2016,[id number],CARDIAC CATHETERIZATION     Right total hip arthroplasty Right 10/2018    Demario Green MD,  [facility]     VASECTOMY      No Comments Provided     Social History     Tobacco Use     Smoking status: Former Smoker     Quit date: 1984     Years since quittin.9     Smokeless tobacco: Never Used   Substance Use Topics     Alcohol use: Yes     Alcohol/week: 5.8 standard drinks     Comment: Alcoholic Drinks/day: 1 drinks a day     Current Outpatient Medications   Medication Sig Dispense Refill     acetaminophen (TYLENOL) 325 MG tablet Take 2 tablets (650 mg) by mouth every 4 hours as needed for mild pain (Patient taking differently: Take 650 mg by mouth 2 times daily as needed for mild  pain ) 100 tablet 1     digoxin (LANOXIN) 250 MCG tablet TAKE 1 TABLET (250 MCG) BY MOUTH DAILY 90 tablet 11     fish oil-omega-3 fatty acids 1000 MG capsule Take 2 g by mouth daily       Glucosamine Sulfate 1000 MG TABS Take 2 tablets by mouth       metoprolol succinate ER (TOPROL-XL) 50 MG 24 hr tablet TAKE 1 TABLET BY MOUTH EVERY DAY 90 tablet 3     Tadalafil, PAH, (ADCIRCA PO) Take 20 mg by mouth 2 times daily       torsemide (DEMADEX) 20 MG tablet TAKE 1 TABLET BY MOUTH TWICE A  tablet 8     warfarin ANTICOAGULANT (COUMADIN) 3 MG tablet TAKE 9 MG X 1 DAY AND 6 MG X 6 DAYS/WEEK. OR AS DIRECTED BY THE [facility]. 200 tablet 1     Allergies   Allergen Reactions     Pollen Extract Other (See Comments)     Nasal congestion  Nasal congestion     Past medical history, past surgical history, current medications and allergies reviewed and accurate to the best of my knowledge.      ROS:  Refer to HPI    /62   Pulse 50   Temp 97.5  F (36.4  C) (Tympanic)   Resp 18   Wt 92.7 kg (204 lb 6.4 oz)   SpO2 97%   BMI 31.08 kg/m      EXAM:  General Appearance: Well appearing 75-year old male, appropriate appearance for age. No acute distress  Respiratory: normal chest wall and respirations.  Normal effort.  Clear to auscultation bilaterally, no wheezing, crackles or rhonchi.  No increased work of breathing.  No cough appreciated.  Cardiac: RRR with no murmurs  Musculoskeletal:    Right HAND PHYSICAL EXAMINATION:  Inspection: edema and erythema to MCP and PIP joint index finger and to CMC and IP joint of thumb/digit 1, no other bony deformity or skin abnormalities noted.    Palpation: Tenderness to palpation MCP and PIP joint of 2nd digit and CMC and IP of digit 1. Tenderness to palpation over anatomical snuffbox/scaphoid: No.     ROM:    Thumb adduction/abduction/flexion/extension: ROM is full, fluid and intact   Finger flexion/extension (MCP, DIP, PIP): ROM is full, fluid and intact 3-5. Stiffness in  flexion and extension of PIP and DIP joint of digit 2.    Abduction/Adduction (2-5th MCP joint): ROM is full, fluid and intact   Opposition: intact     strength: intact    Special testing: Kirit exam normal    Neurovascular status: sensation and distal pulses intact.   Edema Exam:  Skin Color: erythematous  Pulses: Right ulnar palpable, Right radial palpable 2+ and symmetric  Edema: 1+ of right hand (location)  Cardiac/Pulmonary Exams: see above, normal  Psychological: normal affect, alert, oriented, and pleasant.     Labs:  -CBC: stable for patient without evidence of shift  -CMP: stable for patient in regards to renal, hepatic and electrolyte function. Cr slight increase to 1.42 today. Normal albumin.   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 No fracture, dislocation noted.

## 2021-08-27 NOTE — TELEPHONE ENCOUNTER
Patient call returned, verification of name and . Patient states sought [facility] care and received diagnosis of gout. Sufficient treatment and evaluation received. Advised patient to call back with any further questions or concerns, worsening symptoms. Patient verbalized understanding, no further questions at this time. Aida Cutler RN ....................  2021   2:29 PM

## 2021-08-27 NOTE — NURSING NOTE
"Chief Complaint   Patient presents with     Musculoskeletal Problem     right hand     Patient is here for pain and swelling in his right hand that started 5 days ago.    Initial /62   Pulse 50   Temp 97.5  F (36.4  C) (Tympanic)   Resp 18   Wt 92.7 kg (204 lb 6.4 oz)   SpO2 97%   BMI 31.08 kg/m   Estimated body mass index is 31.08 kg/m  as calculated from the following:    Height as of 5/13/21: 1.727 m (5' 8\").    Weight as of this encounter: 92.7 kg (204 lb 6.4 oz).  Medication Reconciliation: complete    Maria E Danielle LPN  "

## 2021-08-27 NOTE — PATIENT INSTRUCTIONS
Patient Education     Gout Diet  Gout is a painful condition caused by an excess of uric acid, a waste product made by the body. Uric acid forms crystals that collect in the joints. The immune response to these crystals brings on symptoms of joint pain and swelling. This is called a gout attack. Often, medications and diet changes are combined to manage gout. Below are some guidelines for changing your diet to help you manage gout and prevent attacks. Your healthcare provider will help you determine the best eating plan for you.  Eating to manage gout  Weight loss for those who are overweight may help reduce gout attacks.  Eat less of these foods  Eating too many foods containing purines may raise the levels of uric acid in your body. This raises your risk for a gout attack. Try to limit these foods and drinks:    Alcohol, such as beer and red wine. You may be told to avoid alcohol completely.    Soft drinks that contain sugar or high fructose corn syrup    Certain fish, including anchovies, sardines, fish eggs, and herring    Shellfish    Certain meats, such as red meat, hot dogs, luncheon meats, and turkey    Organ meats, such as liver, kidneys, and sweetbreads    Legumes, such as dried beans and peas    Other high fat foods such as gravy, whole milk, and high fat cheeses    Vegetables such as asparagus, cauliflower, spinach, and mushrooms used to be thought to contribute to an increased risk for a gout attack, but recent studies show that high purine vegetables don't increase the risk for a gout attack.  Eat more of these foods  Other foods may be helpful for people with gout. Add some of these foods to your diet:    Cherries contain chemicals that may lower uric acid.    Omega fatty acids. These are found in some fatty fish such as salmon, certain oils (flax, olive, or nut), and nuts themselves. Omega fatty acids may help prevent inflammation due to gout.    Dairy products that are low-fat or fat-free, such as  cheese and yogurt    Complex carbohydrate foods, including whole grains, brown rice, oats, and beans    Coffee, in moderation    Water, approximately 64 ounces per day  Follow-up care  Follow up with your healthcare provider as advised.  When to seek medical advice  Call your healthcare provider right away if any of these occur:    Return of gout symptoms, usually at night:    Severe pain, swelling, and heat in a joint, especially the base of the big toe    Affected joint is hard to move    Skin of the affected joint is purple or red    Fever of 100.4 F (38 C) or higher    Pain that doesn't get better even with prescribed medicine   [employer] last reviewed this educational content on 6/1/2018 2000-2021 [employer]. All rights reserved. This information is not intended as a substitute for professional medical care. Always follow your healthcare professional's instructions.           Patient Education     What Is Gout?  Gout is a disease that affects the joints. Left untreated, it can lead to painful foot and joint deformities and even kidney problems. But, by treating gout early, you can relieve pain and help prevent future problems. Gout can usually be treated with medicine and proper diet. In severe cases, surgery may be needed.  What causes gout?  Gout is caused by an excess of uric acid (a waste product made by the body). Uric acid is excreted by the kidneys. If the uric acid level in your blood rises too high, the uric acid may form crystals that collect in the joints, bringing on a gout attack. If you have many gout attacks, crystals may form large deposits called tophi. Tophi can damage joints and cause deformity.  Who is at risk for gout?  Men are more likely to have gout than women. But women can also be affected, mostly after menopause. Some health problems, such as obesity and high cholesterol, make gout more likely. And some medicines, such as diuretics ( water pills ), can trigger a gout  "attack. People who drink a lot of alcohol are at high risk for gout. Certain foods can also trigger a gout attack.  Substances that may trigger a gout attack  To help prevent a gout attack, avoid these foods:    Alcohol (particularly beer, but also red wine and spirits)    Certain meats (red meat, processed meat, turkey)    Organ meats (kidney, liver, sweetbread)    Shellfish (lobster, crab, shrimp, scallop, mussel)    Certain fish (anchovy, sardine, herring, mackerel)  Treatment    Lifestyle changes, including weight loss, exercise, and quitting tobacco use    Reducing consumption of the food groups above as well as high fructose corn syrup, found in many foods including sodas and energy drinks    Changing non-essential medicines that may contribute to gout (such as thiazide diuretics--\"water pills\")    Medicines to reduce the amount of uric acid in the blood, such as allopurinol, probencid, febuxostat, and lesinurad.    Medicines to treat acute gout attacks, including NSAIDs (nonsteroidal anti-inflammatory medicines), steroids, and colchicine    [employer] last reviewed this educational content on 4/1/2018 2000-2021 [employer]. All rights reserved. This information is not intended as a substitute for professional medical care. Always follow your healthcare professional's instructions.           "

## 2021-08-30 ENCOUNTER — TELEPHONE (OUTPATIENT)
Dept: FAMILY MEDICINE | Facility: OTHER | Age: 76
End: 2021-08-30

## 2021-08-30 NOTE — TELEPHONE ENCOUNTER
Spoke with patient regarding the Prednisone taper he started. States he will be going to [place] tomorrow and they will be doing labs including INR. He will call [facility] with result and if he needs any adjusting with warfarin. Saba Honeycutt RN    8/30/2021  10:16 AM

## 2021-09-07 ENCOUNTER — TELEPHONE (OUTPATIENT)
Dept: FAMILY MEDICINE | Facility: OTHER | Age: 76
End: 2021-09-07

## 2021-09-07 DIAGNOSIS — N18.31 STAGE 3A CHRONIC KIDNEY DISEASE (H): Primary | ICD-10-CM

## 2021-09-07 NOTE — TELEPHONE ENCOUNTER
Patient calling regarding  On 9/1 went to [place] for annual cardio work up, results showed his tricupid value is regurgitation and Right ventricular chamber size.   Dr wants him to do cardio rehab exercise, reduce his sodium intake, Increase his torsemide (which MD did send in new rx) Needs to have his creatine lab drawn in 7-10 from increases. Needs an order for that.   He has the order for cardio rehab.  Wondering if he can do this here.   Herlinda Cruz LPN ..........9/7/2021 10:54 AM

## 2021-09-14 ENCOUNTER — TELEPHONE (OUTPATIENT)
Dept: FAMILY MEDICINE | Facility: OTHER | Age: 76
End: 2021-09-14

## 2021-09-14 DIAGNOSIS — I48.20 CHRONIC ATRIAL FIBRILLATION (H): ICD-10-CM

## 2021-09-14 RX ORDER — TORSEMIDE 20 MG/1
TABLET ORAL
Qty: 180 TABLET | Refills: 8 | COMMUNITY
Start: 2021-09-14 | End: 2022-07-20

## 2021-09-14 NOTE — TELEPHONE ENCOUNTER
J[facility]-patient is looking to have a blood test for creatanin levels in his blood     Please call and let him know when the order is placed    Thank You    Annemarie Jang on 9/14/2021 at 2:46 PM

## 2021-09-14 NOTE — TELEPHONE ENCOUNTER
Talked with patient and he saw his cardiologist and he increased his torsemide.  He told him he should have his creatinine checked. There is a future BMP order from Donte Mckeon MD.  Did transfer him to the appointment line for a lab only draw.  Breanna Thakur, AMBER, LPN  9/14/2021  4:55 PM

## 2021-09-16 ENCOUNTER — LAB (OUTPATIENT)
Dept: LAB | Facility: OTHER | Age: 76
End: 2021-09-16
Attending: FAMILY MEDICINE
Payer: MEDICARE

## 2021-09-16 DIAGNOSIS — N18.31 STAGE 3A CHRONIC KIDNEY DISEASE (H): ICD-10-CM

## 2021-09-16 LAB
ANION GAP SERPL CALCULATED.3IONS-SCNC: 7 MMOL/L (ref 3–14)
BUN SERPL-MCNC: 36 MG/DL (ref 7–25)
CALCIUM SERPL-MCNC: 8.8 MG/DL (ref 8.6–10.3)
CHLORIDE BLD-SCNC: 99 MMOL/L (ref 98–107)
CO2 SERPL-SCNC: 33 MMOL/L (ref 21–31)
CREAT SERPL-MCNC: 1.53 MG/DL (ref 0.7–1.3)
GFR SERPL CREATININE-BSD FRML MDRD: 44 ML/MIN/1.73M2
GLUCOSE BLD-MCNC: 120 MG/DL (ref 70–105)
POTASSIUM BLD-SCNC: 3.9 MMOL/L (ref 3.5–5.1)
SODIUM SERPL-SCNC: 139 MMOL/L (ref 134–144)

## 2021-09-16 PROCEDURE — 36415 COLL VENOUS BLD VENIPUNCTURE: CPT | Mod: ZL

## 2021-09-16 PROCEDURE — 80048 BASIC METABOLIC PNL TOTAL CA: CPT | Mod: ZL

## 2021-09-20 ENCOUNTER — HOSPITAL ENCOUNTER (OUTPATIENT)
Dept: CARDIAC REHAB | Facility: HOSPITAL | Age: 76
Setting detail: THERAPIES SERIES
End: 2021-09-20
Attending: FAMILY MEDICINE
Payer: MEDICARE

## 2021-09-20 VITALS — HEIGHT: 68 IN | BODY MASS INDEX: 30.1 KG/M2 | WEIGHT: 198.6 LBS

## 2021-09-20 PROCEDURE — G0237 THERAPEUTIC PROCD STRG ENDUR: HCPCS

## 2021-09-20 PROCEDURE — G0238 OTH RESP PROC, INDIV: HCPCS

## 2021-09-20 ASSESSMENT — DUKE ACTIVITY SCORE INDEX (DASI)
DASI METS SCORE: 6.91
VO2_PEAK: 24.2

## 2021-09-20 ASSESSMENT — 6 MINUTE WALK TEST (6MWT)
GENDER SELECTION: MALE
MALE CALC: 458.09
FEMALE CALC: 385.43
TOTAL DISTANCE WALKED: 289.6

## 2021-09-20 ASSESSMENT — MIFFLIN-ST. JEOR: SCORE: 1605.34

## 2021-09-20 NOTE — PROGRESS NOTES
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 Patient is following up with an angiogram at [facility] in November. Patient will be reveiwed for possible valve repair or replacement at that time.    Outpatient Pulmonary Rehab Start Date 09/20/21   Primary Physician Mike Olivares   General Information Comments 9/20: Patient will be attending Pulmonary Rehab to help increase his upper body, and whole body strength. He will be exercising twice per week in the gym and also doing exercises given to him to complete at home.    Medical History/Comorbidities   Medical History/Comorbidities Osteoarthritis   Medical History Comments 9/20: Patient has had a right hip replacement in 2018.   Untoward Events/Exacerbations/Hospitalizations   Untoward Events/Exacerbations/Hospitalizations None   Sputum   Sputum Production Amount large   Sputum Production Color Clear   Sputum Production Consistency Thick   Tobacco History   Tobacco Former smoker   Tobacco Habit Cigarettes   Years Smoked 15   Average Packs Per Day .25   Interventions Planned None: Patient is in maintenance   Medications   Long-Acting Beta Agonist Not prescribed   Short-Acting Beta Agonist Not prescribed   Long-Acting Anticholinergic Not prescribed   Short-Acting Anticholinergic Not prescribed   Inhaled Corticosteroid Not prescribed   Oral Corticosteroid Not prescribed   Medications Reconciled By Patient;Medical record   Preventative Vaccinations   Influenza Vaccination Yes   Pneumonia Vaccination Yes   Pain   Patient Currently in Pain No   Pain Comments Joint pain occassionally/     Pain Treatment Recommendations acetaminophin   Fall Risk Screen   Fall screen completed by Pulmonary Rehab   Have you fallen 2 or more times in the past year? No   Have you fallen and had an injury in the past year? No   Timed Up and Go score (seconds) NA   Is patient a fall risk? No   Fall screen comments 9/20: Patient is not at risk for falls.    Abuse Screen (yes response referral indicated)   Feels Unsafe at Home or Work/School no   Feels Threatened by Someone no   Does Anyone Try to Keep You From Having Contact with Others or Doing Things Outside Your Home? no   Physical Signs of Abuse Present no   Living and Work Status   Living Arrangements house;spouse   Support System Live with an adult   Environmental Factors Pets;Plants   ADL Limitations None   Initial Duke Activity Status Index (DASI) score. A measure of functional capacity. The goal is to have a pre-program raw score of 9.95 (~4 METs) or above 33.95   Initial DASI VO2 Peak (ml*kg-1*min-1) 24.2   Initial DASI MET Level 6.91   Occupation  for [employer].   Return to Employment Retired   Physical Assessments   Incisions WNL   Edema +1 Trace   Left Lung Sounds normal   Right Lung Sounds normal   Comments 9/20: Patient's lasix dose was recently increased to 60 mg/day.    Pulmonary Function Test (PFTs)   PFT Results Information not available   Individualized Treatment Plan   Sessions Scheduled 18   Sessions Attended 1   Type Aerobic exercise;Resistance training   Oxygen Use   Supplemental Oxygen Needed No   Exercise Prescription   Mode Treadmill;Nustep;Recumbant bike;Arm Ergometer/UBE;Weights   Frequency 2 days/week   Duration/Time 30-45 min   THR (85% of age predicted max heart rate)  122.4   Effort Rating (0-10) 4-6/10   Progression of Exercise Patient to increase workloads by .5 MET's per week.   Oxygen Titration with Exercise NA   Comments 9/20: Patient would like speciific exercises to perform at home.    Exercise Assessment   6 Minute Walk  "Predicted - Gender Selection Male   6 Minute Walk Predicted (Male) 458.09   6 Minute Walk Distance (Initial) 289.6 Meters   Resting HR 66   Exercise    Resting /72   Exercise /72   SpO2 97   Exercise SpO2 90   Current MET level 2.38   Exercise Tolerance fair   Normal Limits Discussed Yes   Current Symptoms at Home Dyspnea;Fatigue   Current Symptoms in Rehab Dyspnea   Limitations shortness of breath, deconditioning   Nutrition Management   Age 75   Height 1.727 m (5' 8\")   Weight 90.1 kg (198 lb 9.6 oz)   BMI (Calculated) 30.2   Assessment Overweight   Interventions Planned Attend group nutrition class;Initiate plan for weight loss/weight maintenance  (Pt will be given educational material regarding nutrition.)   Psychosocial   Initial Patient Health Questionnaire -9 (PHQ-9) for depression. To notify physician if pre-score >9. 2   Initial [facility] Survey score. A quality of life measure. To re evaluate if total score is > 25. Also consider PHQ-9 and DASI to determine if patient should be referred back to physician. 21   Initial COPD Assessment Test (CAT). A quality of life measure. Scores range from 0-40. Higher scores indicate greater levels of limitations. The goal is to reduce scores pre to post program. 13   Initial Shortness of Breath Questionnaire (SOBQ) score. The goal is to reduce the score pre to post program. 25   Intervention Planned Introduce relaxation techniques to assist with decreased anxiety;Use breathing techniques to control dyspnea cycle   Psychosocial Comments 9/20: Patient was shown how to use PLB technique.    Stages of Change   Aerobic Exercise Contemplation   Physical Activity Contemplation   Recommended diet Preparation   Stress Action   Smoking Cessation Maintenance   Oxygen Usage NA   Current Home Exercise   Type of Exercise None   Recommended Home Exercise Prescription   Type of Exercise Walking;LE Strengthening Exercise;Other (comments)  (weights/resistence bands) "   Frequency (Days per week) 3-5   Duration (minutes per session) 15-30 min   Effort Rating Recommended (0-10) Scale  4-6/10   Recommended Exercise Heart Rate Range 20-30 beats above resting. Patient does have a-fib.    30 Day Exercise Plan 9/20: Patient states he and his wife had been exercising to a televised exercise program until he was unable to continue due to his shortness of breath.    Learning Assessment   Learner Patient;Spouse/Significant Other   Primary Language English   Preferred Learning Style Listening;Reading;Demonstration   Barriers to Learning No barriers noted   Patient Education/Referrals   Education Recommended Activities of Daily Living;Advance Directives;Airway Clearance;Anatomy and Disease Process;Breathing Techniques;Energy Conservation;Exercise Principles;Medication Overview;Nutrition;Panic and Anxiety   Follow-up/On-going Support   Provider follow-up needed on the following No follow-up needed   Comments 9/20: Patient is following up at the [facility] in November.    Pulmonary Rehab Goals   Pulmonary Rehab Goals 1   Goal 1   Goal Patient would like to be able to walk to his mailbox at home without having to stop and rest. (Approximately, 2 blocks)   Target Date 11/04/21   Progress Towards Goal 9/20: Patient was able to complete his 6MWT without stopping to rest.    Assessment   Assessment 9/20: Patient attended his initial appointment with his wife today. 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 He has a history of persistent a-fib since that time. He is on chronic anticoagulation therapy. He has recently had his lasix increased from 40 mg to 60 mg daily.   Patient will be following up with the [facility] in November, for an angiogram and review of possible valve replacement or repair.   Monitored respiratory therapy is necessary to help patient  meet his goal to be able to walk to the mailbox without resting and to help patient gain muscle mass and strength to help decrease his shortness of breath. Patient will also be given education regarding pertinent topics regarding his illness.    I have reviewed initial evaluation outcomes, and agree with exercise prescription for respiratory therapy for this patient

## 2021-09-21 ENCOUNTER — HOSPITAL ENCOUNTER (OUTPATIENT)
Dept: CARDIAC REHAB | Facility: HOSPITAL | Age: 76
Setting detail: THERAPIES SERIES
End: 2021-09-21
Attending: INTERNAL MEDICINE
Payer: MEDICARE

## 2021-09-21 ENCOUNTER — ANTICOAGULATION THERAPY VISIT (OUTPATIENT)
Dept: ANTICOAGULATION | Facility: OTHER | Age: 76
End: 2021-09-21
Attending: FAMILY MEDICINE
Payer: MEDICARE

## 2021-09-21 VITALS — BODY MASS INDEX: 30.09 KG/M2 | WEIGHT: 197.9 LBS

## 2021-09-21 DIAGNOSIS — Z79.01 ANTICOAGULATION MONITORING, INR RANGE 2-3: ICD-10-CM

## 2021-09-21 DIAGNOSIS — I48.20 CHRONIC ATRIAL FIBRILLATION (H): Primary | ICD-10-CM

## 2021-09-21 LAB — INR POINT OF CARE: 2.9 (ref 0.9–1.1)

## 2021-09-21 PROCEDURE — G0239 OTH RESP PROC, GROUP: HCPCS

## 2021-09-21 PROCEDURE — G0237 THERAPEUTIC PROCD STRG ENDUR: HCPCS

## 2021-09-21 PROCEDURE — 36416 COLLJ CAPILLARY BLOOD SPEC: CPT | Mod: ZL

## 2021-09-23 ENCOUNTER — HOSPITAL ENCOUNTER (OUTPATIENT)
Dept: CARDIAC REHAB | Facility: HOSPITAL | Age: 76
Setting detail: THERAPIES SERIES
End: 2021-09-23
Attending: INTERNAL MEDICINE
Payer: MEDICARE

## 2021-09-23 VITALS — BODY MASS INDEX: 29.98 KG/M2 | WEIGHT: 197.2 LBS

## 2021-09-23 PROCEDURE — G0239 OTH RESP PROC, GROUP: HCPCS

## 2021-09-28 ENCOUNTER — HOSPITAL ENCOUNTER (OUTPATIENT)
Dept: CARDIAC REHAB | Facility: HOSPITAL | Age: 76
Setting detail: THERAPIES SERIES
End: 2021-09-28
Attending: INTERNAL MEDICINE
Payer: MEDICARE

## 2021-09-28 VITALS — WEIGHT: 201.6 LBS | BODY MASS INDEX: 30.65 KG/M2

## 2021-09-28 PROCEDURE — G0239 OTH RESP PROC, GROUP: HCPCS

## 2021-09-30 ENCOUNTER — HOSPITAL ENCOUNTER (OUTPATIENT)
Dept: CARDIAC REHAB | Facility: HOSPITAL | Age: 76
Setting detail: THERAPIES SERIES
End: 2021-09-30
Attending: INTERNAL MEDICINE
Payer: MEDICARE

## 2021-09-30 VITALS — WEIGHT: 202 LBS | BODY MASS INDEX: 30.71 KG/M2

## 2021-09-30 PROCEDURE — G0239 OTH RESP PROC, GROUP: HCPCS

## 2021-10-04 ENCOUNTER — IMMUNIZATION (OUTPATIENT)
Dept: FAMILY MEDICINE | Facility: OTHER | Age: 76
End: 2021-10-04
Attending: FAMILY MEDICINE
Payer: MEDICARE

## 2021-10-04 PROCEDURE — 91300 PR COVID VAC PFIZER DIL RECON 30 MCG/0.3 ML IM: CPT

## 2021-10-05 ENCOUNTER — HOSPITAL ENCOUNTER (OUTPATIENT)
Dept: CARDIAC REHAB | Facility: HOSPITAL | Age: 76
Setting detail: THERAPIES SERIES
End: 2021-10-05
Attending: INTERNAL MEDICINE
Payer: MEDICARE

## 2021-10-05 VITALS — WEIGHT: 203.3 LBS | BODY MASS INDEX: 30.91 KG/M2

## 2021-10-05 PROCEDURE — G0239 OTH RESP PROC, GROUP: HCPCS

## 2021-10-07 ENCOUNTER — HOSPITAL ENCOUNTER (OUTPATIENT)
Dept: CARDIAC REHAB | Facility: HOSPITAL | Age: 76
Setting detail: THERAPIES SERIES
End: 2021-10-07
Attending: INTERNAL MEDICINE
Payer: MEDICARE

## 2021-10-07 PROCEDURE — G0239 OTH RESP PROC, GROUP: HCPCS

## 2021-10-18 VITALS — HEIGHT: 68 IN | WEIGHT: 202.5 LBS | BODY MASS INDEX: 30.69 KG/M2

## 2021-10-18 ASSESSMENT — 6 MINUTE WALK TEST (6MWT)
GENDER SELECTION: MALE
TOTAL DISTANCE WALKED: 289.6
FEMALE CALC: 381.38
MALE CALC: 454.97

## 2021-10-18 ASSESSMENT — DUKE ACTIVITY SCORE INDEX (DASI)
DASI METS SCORE: 6.91
VO2_PEAK: 24.2

## 2021-10-18 ASSESSMENT — MIFFLIN-ST. JEOR: SCORE: 1618.03

## 2021-10-18 NOTE — PROGRESS NOTES
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 Patient is following up with an angiogram at [place] in November. Patient will be reveiwed for possible valve repair or replacement at that time.    Outpatient Pulmonary Rehab Start Date 09/20/21   Primary Physician Mike Olivares   General Information Comments 10/18: Patient will be attending Pulmonary Rehab to help increase his upper body, and whole body strength. He will be exercising twice per week in the gym and also doing exercises given to him to complete at home.    Medical History/Comorbidities   Medical History/Comorbidities Osteoarthritis   Medical History Comments 10/18: Patient has had a right hip replacement in 2018.   Untoward Events/Exacerbations/Hospitalizations   Untoward Events/Exacerbations/Hospitalizations None   Sputum   Sputum Production Amount large   Sputum Production Color Clear   Sputum Production Consistency Thick   Tobacco History   Tobacco Former smoker   Tobacco Habit Cigarettes   Years Smoked 15   Average Packs Per Day .25   Interventions Planned None: Patient is in maintenance   Medications   Long-Acting Beta Agonist Not prescribed   Short-Acting Beta Agonist Not prescribed   Long-Acting Anticholinergic Not prescribed   Short-Acting Anticholinergic Not prescribed   Inhaled Corticosteroid Not prescribed   Oral Corticosteroid Not prescribed   Medications Reconciled By Patient;Medical record   Pain   Patient Currently in Pain No   Pain Comments Joint pain occassionally/    Pain Treatment Recommendations acetaminophin   Fall Risk Screen   Fall screen  completed by Pulmonary Rehab   Have you fallen 2 or more times in the past year? No   Have you fallen and had an injury in the past year? No   Timed Up and Go score (seconds) NA   Is patient a fall risk? No   Fall screen comments 10/18: Patient has not had any recent falls.    Abuse Screen (yes response referral indicated)   Feels Unsafe at Home or Work/School no   Feels Threatened by Someone no   Does Anyone Try to Keep You From Having Contact with Others or Doing Things Outside Your Home? no   Physical Signs of Abuse Present no   Living and Work Status   Living Arrangements house;spouse   Support System Live with an adult   Environmental Factors Pets;Plants   ADL Limitations None   Initial Duke Activity Status Index (DASI) score. A measure of functional capacity. The goal is to have a pre-program raw score of 9.95 (~4 METs) or above 33.95   Initial DASI VO2 Peak (ml*kg-1*min-1) 24.2   Initial DASI MET Level 6.91   Occupation  for [employer].   Return to Employment Retired   Physical Assessments   Incisions Not assessed   Edema Not assessed   Left Lung Sounds not assessed   Right Lung Sounds not assessed   Comments 10/18: Patient's physical assessments are WNL.   Pulmonary Function Test (PFTs)   PFT Results Information not available   Individualized Treatment Plan   Sessions Scheduled 18   Sessions Attended 7   Type Aerobic exercise   Oxygen Use   Supplemental Oxygen Needed No   Exercise Prescription   Mode Treadmill;Nustep;Recumbant bike;Arm Ergometer/UBE;Weights   Frequency 2 days/week   Duration/Time 30-45 min   THR (85% of age predicted max heart rate)  122.4   Effort Rating (0-10) 4-6/10   Progression of Exercise Patient to increase workloads by .5 MET's per week.   Oxygen Titration with Exercise NA   Comments 10/18: Patient was provided exercises to do at home.    Exercise Assessment   6 Minute Walk Predicted - Gender Selection Male   6 Minute Walk Predicted (Female) 381.38   6 Minute Walk  "Predicted (Male) 454.97   6 Minute Walk Distance (Initial) 289.6 Meters   Resting HR 90   Exercise HR 96   Resting /70   Exercise /68   SpO2 97  (room air)   Exercise SpO2 94  (room air)   Exercise Tolerance fair   Normal Limits Discussed Yes   Current Symptoms at Home Dyspnea;Fatigue   Current Symptoms in Rehab Dyspnea   Limitations shortness of breath, deconditioning   Nutrition Management   Age 75   Height 1.727 m (5' 8\")   Weight 91.9 kg (202 lb 8 oz)   BMI (Calculated) 30.79   Assessment Overweight   Interventions Planned Attend group nutrition class;Initiate plan for weight loss/weight maintenance  (Pt will be given educational material regarding nutrition.)   Psychosocial   Initial Patient Health Questionnaire -9 (PHQ-9) for depression. To notify physician if pre-score >9. 2   Initial [facility] Survey score. A quality of life measure. To re evaluate if total score is > 25. Also consider PHQ-9 and DASI to determine if patient should be referred back to physician. 21   Initial COPD Assessment Test (CAT). A quality of life measure. Scores range from 0-40. Higher scores indicate greater levels of limitations. The goal is to reduce scores pre to post program. 13   Initial Shortness of Breath Questionnaire (SOBQ) score. The goal is to reduce the score pre to post program. 25   Intervention Planned Introduce relaxation techniques to assist with decreased anxiety;Use breathing techniques to control dyspnea cycle   Psychosocial Comments 10/18: Patient is encouraged to use pursed lip breathing technique.    Stages of Change   Aerobic Exercise Contemplation   Physical Activity Contemplation   Recommended diet Preparation   Stress Action   Smoking Cessation Maintenance   Oxygen Usage NA   Current Home Exercise   Type of Exercise Other (comments)  (Patient follows home exercise video with his wife. )   Recommended Home Exercise Prescription   Type of Exercise Walking;LE Strengthening Exercise;Other " (comments)  (weights/resistence bands)   Frequency (Days per week) 3-5   Duration (minutes per session) 15-30 min   Effort Rating Recommended (0-10) Scale  4-6/10   30 Day Exercise Plan 10/18: Patient states he and his wife had been exercising to a televised exercise program until he was unable to continue due to his shortness of breath.    Learning Assessment   Learner Patient;Spouse/Significant Other   Primary Language English   Preferred Learning Style Listening;Reading;Demonstration   Barriers to Learning No barriers noted   Patient Education/Referrals   Education Recommended Activities of Daily Living;Advance Directives;Airway Clearance;Anatomy and Disease Process;Breathing Techniques;Energy Conservation;Exercise Principles;Medication Overview;Nutrition;Panic and Anxiety   Follow-up/On-going Support   Provider follow-up needed on the following No follow-up needed   Comments 10/18:  Patient is following up at the [facility] in November.    Pulmonary Rehab Goals   Pulmonary Rehab Goals 1   Goal 1   Goal Patient would like to be able to walk to his mailbox at home without having to stop and rest. (Approximately, 2 blocks)   Target Date 11/04/21   Progress Towards Goal 10/18: Patient was able to complete his 6MWT without stopping to rest.    Assessment   Assessment 10/18: Patient has attended seven sessions so far in Pulmonary Rehab for his pulmonary hypertension. Patient has achieved up to 3.5 MET's at this time. Patient had been on vacation the week previous to this writing. He is still scheduled for a cardiac cath in November and is hoping to complete as many sessions as possible prior to this surgery.   Staff has had a conversation with patient regarding transfer to [facility] for his convenience, if it is available to him.   Continued therapy is necessary to help patient safely advance to higher levels of activity.    I have reviewed and agree with this patient s individual treatment plan and exercise  prescription for respiratory therapy.  Please see  individual treatment plan for details of progress and plan.

## 2021-10-19 ENCOUNTER — HOSPITAL ENCOUNTER (OUTPATIENT)
Dept: CARDIAC REHAB | Facility: HOSPITAL | Age: 76
Setting detail: THERAPIES SERIES
End: 2021-10-19
Attending: INTERNAL MEDICINE
Payer: MEDICARE

## 2021-10-19 VITALS — BODY MASS INDEX: 30.71 KG/M2 | WEIGHT: 202 LBS

## 2021-10-19 PROCEDURE — G0239 OTH RESP PROC, GROUP: HCPCS

## 2021-10-21 ENCOUNTER — HOSPITAL ENCOUNTER (OUTPATIENT)
Dept: CARDIAC REHAB | Facility: HOSPITAL | Age: 76
Setting detail: THERAPIES SERIES
End: 2021-10-21
Attending: INTERNAL MEDICINE
Payer: MEDICARE

## 2021-10-21 VITALS — BODY MASS INDEX: 30.62 KG/M2 | WEIGHT: 201.4 LBS

## 2021-10-21 PROCEDURE — G0238 OTH RESP PROC, INDIV: HCPCS

## 2021-10-26 ENCOUNTER — HOSPITAL ENCOUNTER (OUTPATIENT)
Dept: CARDIAC REHAB | Facility: HOSPITAL | Age: 76
Setting detail: THERAPIES SERIES
End: 2021-10-26
Attending: INTERNAL MEDICINE
Payer: MEDICARE

## 2021-10-26 VITALS — WEIGHT: 202.4 LBS | BODY MASS INDEX: 30.77 KG/M2

## 2021-10-26 PROCEDURE — G0239 OTH RESP PROC, GROUP: HCPCS

## 2021-10-28 ENCOUNTER — HOSPITAL ENCOUNTER (OUTPATIENT)
Dept: CARDIAC REHAB | Facility: HOSPITAL | Age: 76
Setting detail: THERAPIES SERIES
End: 2021-10-28
Attending: INTERNAL MEDICINE
Payer: MEDICARE

## 2021-10-28 VITALS — BODY MASS INDEX: 30.71 KG/M2 | WEIGHT: 202 LBS

## 2021-10-28 PROCEDURE — G0239 OTH RESP PROC, GROUP: HCPCS

## 2021-11-02 ENCOUNTER — HOSPITAL ENCOUNTER (OUTPATIENT)
Dept: CARDIAC REHAB | Facility: HOSPITAL | Age: 76
Setting detail: THERAPIES SERIES
End: 2021-11-02
Attending: INTERNAL MEDICINE
Payer: MEDICARE

## 2021-11-02 VITALS — BODY MASS INDEX: 30.97 KG/M2 | WEIGHT: 203.7 LBS

## 2021-11-02 PROCEDURE — G0239 OTH RESP PROC, GROUP: HCPCS

## 2021-11-04 ENCOUNTER — HOSPITAL ENCOUNTER (OUTPATIENT)
Dept: CARDIAC REHAB | Facility: HOSPITAL | Age: 76
Setting detail: THERAPIES SERIES
End: 2021-11-04
Attending: INTERNAL MEDICINE
Payer: MEDICARE

## 2021-11-04 VITALS — HEIGHT: 68 IN | BODY MASS INDEX: 30.52 KG/M2 | WEIGHT: 201.4 LBS

## 2021-11-04 PROCEDURE — G0239 OTH RESP PROC, GROUP: HCPCS

## 2021-11-04 ASSESSMENT — DUKE ACTIVITY SCORE INDEX (DASI)
TOTAL_SCORE: 36.7
VO2_PEAK: 24.2
DASI METS SCORE: 6.91
DASI METS SCORE: 7.25
VO2_PEAK: 25.38

## 2021-11-04 ASSESSMENT — 6 MINUTE WALK TEST (6MWT)
GENDER SELECTION: MALE
FEMALE CALC: 382.52
TOTAL DISTANCE WALKED: 289.6
MALE CALC: 455.85

## 2021-11-04 ASSESSMENT — MIFFLIN-ST. JEOR: SCORE: 1613.04

## 2021-11-04 NOTE — PROGRESS NOTES
11/04/21 1200   Session   Session Discharge Note   Certified through this date 11/04/21   Type Discharge/Follow-up   General Information   Treatment Diagnosis Pulmonary Hypertension   Classification of COPD NA   Hospital Location Not hospitalized   Comments 11/4: Patient is following up with cardiology on November 10 for further workup on his condition.    Outpatient Pulmonary Rehab Start Date 09/20/21   Primary Physician Mike Olivares   General Information Comments 11/4: Patient attended 13 exercise sessions in Pulmonary Rehab. He feels he has gained strength since starting .    Medical History/Comorbidities   Medical History/Comorbidities Osteoarthritis   Medical History Comments 11/4: Patient has not been limited by pain while exercising in [place].    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.)   Tobacco History   Tobacco Former smoker   Tobacco Habit Cigarettes   Years Smoked 15   Average Packs Per Day .25   Interventions Planned None: Patient is in maintenance   Medications   Long-Acting Beta Agonist Not prescribed   Short-Acting Beta Agonist Not prescribed   Long-Acting Anticholinergic Not prescribed   Short-Acting Anticholinergic Not prescribed   Inhaled Corticosteroid Not prescribed   Oral Corticosteroid Not prescribed   Medications Reconciled By Patient;Medical record   Pain   Patient Currently in Pain No   Pain Comments Joint pain occassionally/    Pain Treatment Recommendations acetaminophin   Fall Risk Screen   Fall screen completed by Pulmonary Rehab   Have you fallen 2 or more times in the past year? No   Have you fallen and had an injury in the past year? No   Timed Up and Go score (seconds) NA   Is patient a fall risk? No   Fall screen comments 11/4: Patient did not have any falls since he initiated Pulmonary Rehab.    Abuse  Screen (yes response referral indicated)   Feels Unsafe at Home or Work/School no   Feels Threatened by Someone no   Does Anyone Try to Keep You From Having Contact with Others or Doing Things Outside Your Home? no   Physical Signs of Abuse Present no   Living and Work Status   Living Arrangements house;spouse   Support System Live with an adult   Environmental Factors Pets;Plants   ADL Limitations None   Initial Duke Activity Status Index (DASI) score. A measure of functional capacity. The goal is to have a pre-program raw score of 9.95 (~4 METs) or above 33.95   Initial DASI VO2 Peak (ml*kg-1*min-1) 24.2   Initial DASI MET Level 6.91   Discharge DASI score 36.7   Discharge DASI VO2 Peak 25.38   Discharge DASI MET Level 7.25   Occupation  for [employer].   Return to Employment Retired   Physical Assessments   Incisions Not applicable   Edema Not assessed;None   Left Lung Sounds not assessed   Right Lung Sounds not assessed   Comments 11/4: Patient continues to be short of breath with activity.    Pulmonary Function Test (PFTs)   PFT Results Information not available   Individualized Treatment Plan   Sessions Scheduled 18   Sessions Attended 13   Type Aerobic exercise   Oxygen Use   Supplemental Oxygen Needed No   Exercise Prescription   Mode Treadmill;Nustep;Recumbant bike;Arm Ergometer/UBE;Weights   Frequency 2 days/week   Duration/Time 30-45 min   THR (85% of age predicted max heart rate)  122.4   Effort Rating (0-10) 4-6/10   Progression of Exercise Patient to increase workloads by .5 MET's per week.   Oxygen Titration with Exercise NA   Comments 11/4: Patient is exercising at home 5-7 days per week.    Exercise Assessment   6 Minute Walk Predicted - Gender Selection Male   6 Minute Walk Predicted (Female) 382.52   6 Minute Walk Predicted (Male) 455.85   6 Minute Walk Distance (Initial) 289.6 Meters   Resting HR 58   Exercise    Resting /64   Exercise /70   SpO2 97  (room air)  "  Exercise SpO2 93  (room air)   Current MET level 5.8   Exercise Tolerance fair   Normal Limits Discussed Yes   Current Symptoms at Home Dyspnea;Fatigue   Current Symptoms in Rehab Dyspnea   Limitations shortness of breath, deconditioning   Nutrition Management   Age 75   Height 1.727 m (5' 8\")   Weight 91.4 kg (201 lb 6.4 oz)   BMI (Calculated) 30.62   Assessment Overweight   Interventions Planned Attend group nutrition class;Initiate plan for weight loss/weight maintenance  (Pt will be given educational material regarding nutrition.)   Psychosocial   Initial Patient Health Questionnaire -9 (PHQ-9) for depression. To notify physician if pre-score >9. 2   [facility] score. A quality of life measure. To re evaluate if total score is > 25. Also consider PHQ-9 and DASI to determine if patient should be referred back to physician. 21   Initial COPD Assessment Test (CAT). A quality of life measure. Scores range from 0-40. Higher scores indicate greater levels of limitations. The goal is to reduce scores pre to post program. 13   Initial Shortness of Breath Questionnaire (SOBQ) score. The goal is to reduce the score pre to post program. 25   Discharge PHQ-9 for depression 0   Discharge [facility] COOP Survey Score 19   Discharge CAT Test Score 7   Discharge SOBQ Score 28   Intervention Planned Introduce relaxation techniques to assist with decreased anxiety;Use breathing techniques to control dyspnea cycle   Interventions Completed Introduced to Relaxation Techniques to assist with decreased anxiety   Psychosocial Comments 11/4: Patient is using PLB technique when reminded.    Stages of Change   Aerobic Exercise Contemplation;Action   Physical Activity Contemplation;Action   Recommended diet Preparation   Stress Action   Smoking Cessation Maintenance   Oxygen Usage NA   Current Home Exercise   Type of Exercise Other (comments)  (Patient follows home exercise video with his wife. )   Frequency (days per " week) 5-7   Duration (minutes per session) 45-60   Recommended Home Exercise Prescription   Type of Exercise Walking;LE Strengthening Exercise;Other (comments)  (weights/resistence bands)   Frequency (Days per week) 3-5   Duration (minutes per session) 15-30 min   Effort Rating Recommended (0-10) Scale  4-6/10   30 Day Exercise Plan 11/4: Patient continues to use home exercise video most days of the week. He has purchased an extra copy to keep in his camper for use when traveling.    Learning Assessment   Learner Patient;Spouse/Significant Other   Primary Language English   Preferred Learning Style Listening;Reading;Demonstration   Barriers to Learning No barriers noted   Patient Education/Referrals   Education Recommended Activities of Daily Living;Advance Directives;Airway Clearance;Anatomy and Disease Process;Breathing Techniques;Energy Conservation;Exercise Principles;Medication Overview;Nutrition;Panic and Anxiety   Follow-up/On-going Support   Provider follow-up needed on the following No follow-up needed   Comments 11/4: Patient follows with specialists next week.    Pulmonary Rehab Goals   Pulmonary Rehab Goals 1   Goal 1   Goal Patient would like to be able to walk to his mailbox at home without having to stop and rest. (Approximately, 2 blocks)   Target Date 11/04/21   Date Met 11/04/21   Progress Towards Goal 11/4: Patient states he has not walked to his mailbox and back without resting, but feels he would be able to do this based on his aerobic exercise he is doing daily. Patient was unable to complete the 6MWT today as he states he started off too fast and tired out quickly. He is learning to pace himself.    Assessment   Assessment 11/4: This patient completed 13 exercise sessions of Respiratory Therapy prior to having further work-up at the [facility]. Overall, he did very well and was able to achieve up to 5.8 METs while exercising in [place]. He was unable to complete his 6MWT today due to fatigue and  shortness of breath. Patient started his walk at a fast pace he was unable to maintain. Patient's CAT, PHQ-9, DASI, and [facility] surveys all improved from initiation of rehab. His SOBQ survey did go up slightly, however.   Staff recommends patient continue to use his home video exercise video to help him maintain, and hopefully improve his overall quality of life and health.    I have reviewed this patient s outcomes and self-report of symptoms.  The patient has completed Pulmonary Rehabilitation and has made appropriate progress towards goals.  Please see individual treatment plan for details of attainment, discharge plan and independent exercise prescription.

## 2021-11-24 ENCOUNTER — ANTICOAGULATION THERAPY VISIT (OUTPATIENT)
Dept: ANTICOAGULATION | Facility: OTHER | Age: 76
End: 2021-11-24
Attending: FAMILY MEDICINE
Payer: MEDICARE

## 2021-11-24 DIAGNOSIS — I48.20 CHRONIC ATRIAL FIBRILLATION (H): Primary | ICD-10-CM

## 2021-11-24 DIAGNOSIS — Z79.01 ANTICOAGULATION MONITORING, INR RANGE 2-3: ICD-10-CM

## 2021-11-24 LAB — INR POINT OF CARE: 2.7 (ref 0.9–1.1)

## 2021-11-24 PROCEDURE — 36416 COLLJ CAPILLARY BLOOD SPEC: CPT | Mod: ZL

## 2021-11-24 NOTE — PROGRESS NOTES
ANTICOAGULATION FOLLOW-UP CLINIC VISIT    Patient Name:  Angel Garrett  Date:  2021  Contact Type:  Face to Face    SUBJECTIVE:  Patient Findings     Comments:  [place] [employer] [place]        Clinical Outcomes     Negatives:  Major bleeding event, Thromboembolic event, Anticoagulation-related hospital admission, Anticoagulation-related ED visit, Anticoagulation-related fatality    Comments:  [place] [employer] [place] signed           OBJECTIVE    Recent labs: (last 7 days)     21  1037   INR 2.7*       ASSESSMENT / PLAN  INR assessment THER    Recheck INR In: 6 WEEKS    INR Location Clinic      Anticoagulation Summary  As of 2021    INR goal:  2.0-3.0   TTR:  88.5 % (1 y)   INR used for dosin.7 (2021)   Warfarin maintenance plan:  9 mg (3 mg x 3) every Sat; 6 mg (3 mg x 2) all other days   Full warfarin instructions:  9 mg every Sat; 6 mg all other days   Weekly warfarin total:  45 mg   No change documented:  Saba Honeycutt RN   Plan last modified:  Saba Honeycutt RN (2020)   Next INR check:  2022   Priority:  Maintenance   Target end date:  Indefinite    Indications    Chronic atrial fibrillation (H) [I48.20]  Anticoagulation monitoring  INR range 2-3 [Z79.01]             Anticoagulation Episode Summary     INR check location:  Anticoagulation Clinic    Preferred lab:  [facility]    Send INR reminders to:  [facility]    Comments:        Anticoagulation Care Providers     Provider Role Specialty Phone number    Donte Mckeon MD [facility] [phone]            See the Encounter Report to view Anticoagulation Flowsheet and Dosing Calendar (Go to Encounters tab in chart review, and find the Anticoagulation Therapy Visit)        Saba Honeycutt RN

## 2021-12-08 DIAGNOSIS — I10 ESSENTIAL HYPERTENSION: ICD-10-CM

## 2021-12-08 DIAGNOSIS — I48.20 CHRONIC ATRIAL FIBRILLATION (H): ICD-10-CM

## 2021-12-08 DIAGNOSIS — Z79.01 LONG TERM CURRENT USE OF ANTICOAGULANT THERAPY: ICD-10-CM

## 2021-12-08 RX ORDER — WARFARIN SODIUM 3 MG/1
TABLET ORAL
Qty: 200 TABLET | Refills: 1 | Status: SHIPPED | OUTPATIENT
Start: 2021-12-08 | End: 2022-06-14

## 2021-12-08 RX ORDER — METOPROLOL SUCCINATE 50 MG/1
TABLET, EXTENDED RELEASE ORAL
Qty: 90 TABLET | Refills: 1 | Status: SHIPPED | OUTPATIENT
Start: 2021-12-08 | End: 2022-06-14

## 2021-12-08 NOTE — TELEPHONE ENCOUNTER
"Requested Prescriptions   Pending Prescriptions Disp Refills     metoprolol succinate ER (TOPROL-XL) 50 MG 24 hr tablet [Pharmacy Med Name: METOPROLOL SUCC ER 50 MG TAB] 90 tablet 3     Sig: TAKE 1 TABLET BY MOUTH EVERY DAY       Beta-Blockers Protocol Passed - 12/8/2021 12:30 AM        Passed - Blood pressure under 140/90 in past 12 months     BP Readings from Last 3 Encounters:   08/27/21 136/62   05/13/21 122/70   11/23/20 136/78                 Passed - Patient is age 6 or older        Passed - Recent (12 mo) or future (30 days) visit within the authorizing provider's specialty     Patient has had an office visit with the authorizing provider or a provider within the authorizing providers department within the previous 12 mos or has a future within next 30 days. See \"Patient Info\" tab in inbasket, or \"Choose Columns\" in Meds & Orders section of the refill encounter.              Passed - Medication is active on med list           warfarin ANTICOAGULANT (COUMADIN) 3 MG tablet [Pharmacy Med Name: WARFARIN SODIUM 3 MG TABLET] 200 tablet 1     Sig: TAKE 9 MG X 1 DAY AND 6 MG X 6 DAYS/WEEK. OR AS DIRECTED BY THE PROTIME CLINIC.       Vitamin K Antagonists Failed - 12/8/2021 12:30 AM        Failed - INR is within goal in the past 6 weeks     Confirm INR is within goal in the past 6 weeks.     Recent Labs   Lab Test 11/24/21  1037   INR 2.7*                       Passed - Recent (12 mo) or future (30 days) visit within the authorizing provider's specialty     Patient has had an office visit with the authorizing provider or a provider within the authorizing providers department within the previous 12 mos or has a future within next 30 days. See \"Patient Info\" tab in inbasket, or \"Choose Columns\" in Meds & Orders section of the refill encounter.              Passed - Medication is active on med list        Passed - Patient is 18 years of age or older           Prescription refilled per RN MedicationRefill " Policy.................... Saba Honeycutt RN ....................  12/8/2021   1:39 PM

## 2022-01-11 ENCOUNTER — MYC MEDICAL ADVICE (OUTPATIENT)
Dept: FAMILY MEDICINE | Facility: OTHER | Age: 77
End: 2022-01-11
Payer: COMMERCIAL

## 2022-01-11 DIAGNOSIS — I27.20 PULMONARY HYPERTENSION (H): Primary | ICD-10-CM

## 2022-01-11 DIAGNOSIS — R06.02 SOB (SHORTNESS OF BREATH): ICD-10-CM

## 2022-01-11 DIAGNOSIS — I50.82 BIVENTRICULAR HEART FAILURE (H): ICD-10-CM

## 2022-01-11 DIAGNOSIS — I50.812 CHRONIC RIGHT-SIDED HEART FAILURE (H): ICD-10-CM

## 2022-01-13 ENCOUNTER — LAB (OUTPATIENT)
Dept: LAB | Facility: OTHER | Age: 77
End: 2022-01-13
Attending: FAMILY MEDICINE
Payer: MEDICARE

## 2022-01-13 DIAGNOSIS — I50.812 CHRONIC RIGHT-SIDED HEART FAILURE (H): ICD-10-CM

## 2022-01-13 DIAGNOSIS — I27.20 PULMONARY HYPERTENSION (H): ICD-10-CM

## 2022-01-13 DIAGNOSIS — I50.82 BIVENTRICULAR HEART FAILURE (H): ICD-10-CM

## 2022-01-13 DIAGNOSIS — R06.02 SOB (SHORTNESS OF BREATH): ICD-10-CM

## 2022-01-13 LAB
CREAT SERPL-MCNC: 1.47 MG/DL (ref 0.7–1.3)
GFR SERPL CREATININE-BSD FRML MDRD: 49 ML/MIN/1.73M2
POTASSIUM BLD-SCNC: 3.9 MMOL/L (ref 3.5–5.1)

## 2022-01-13 PROCEDURE — 82565 ASSAY OF CREATININE: CPT | Mod: ZL

## 2022-01-13 PROCEDURE — 84132 ASSAY OF SERUM POTASSIUM: CPT | Mod: ZL

## 2022-01-13 PROCEDURE — 36415 COLL VENOUS BLD VENIPUNCTURE: CPT | Mod: ZL

## 2022-01-20 ENCOUNTER — LAB (OUTPATIENT)
Dept: LAB | Facility: OTHER | Age: 77
End: 2022-01-20
Attending: FAMILY MEDICINE
Payer: MEDICARE

## 2022-01-20 DIAGNOSIS — I50.82 BIVENTRICULAR HEART FAILURE (H): ICD-10-CM

## 2022-01-20 DIAGNOSIS — R06.02 SOB (SHORTNESS OF BREATH): ICD-10-CM

## 2022-01-20 DIAGNOSIS — I50.812 CHRONIC RIGHT-SIDED HEART FAILURE (H): ICD-10-CM

## 2022-01-20 DIAGNOSIS — I27.20 PULMONARY HYPERTENSION (H): ICD-10-CM

## 2022-01-20 LAB
CREAT SERPL-MCNC: 1.6 MG/DL (ref 0.7–1.3)
GFR SERPL CREATININE-BSD FRML MDRD: 44 ML/MIN/1.73M2
POTASSIUM BLD-SCNC: 3.8 MMOL/L (ref 3.5–5.1)

## 2022-01-20 PROCEDURE — 82565 ASSAY OF CREATININE: CPT | Mod: ZL

## 2022-01-20 PROCEDURE — 84132 ASSAY OF SERUM POTASSIUM: CPT | Mod: ZL

## 2022-01-20 PROCEDURE — 36415 COLL VENOUS BLD VENIPUNCTURE: CPT | Mod: ZL

## 2022-01-20 NOTE — PROGRESS NOTES
ANTICOAGULATION FOLLOW-UP CLINIC VISIT    Patient Name:  Angel Garrett  Date:  6/3/2019  Contact Type:  Face to Face    SUBJECTIVE:  Patient Findings         Clinical Outcomes     Negatives:   Major bleeding event, Thromboembolic event, Anticoagulation-related hospital admission, Anticoagulation-related ED visit, Anticoagulation-related fatality           OBJECTIVE    INR Protime   Date Value Ref Range Status   2019 2.7 (A) 0.86 - 1.14 Final       ASSESSMENT / PLAN  INR assessment THER    Recheck INR In: 6 WEEKS    INR Location Clinic      Anticoagulation Summary  As of 6/3/2019    INR goal:   2.0-3.0   TTR:   80.4 % (5.4 y)   INR used for dosin.7 (6/3/2019)   Warfarin maintenance plan:   9 mg (3 mg x 3) every Sat; 6 mg (3 mg x 2) all other days   Full warfarin instructions:   9 mg every Sat; 6 mg all other days   Weekly warfarin total:   45 mg   No change documented:   Saba Honeycutt RN   Next INR check:   7/15/2019   Priority:   INR   Target end date:   Indefinite    Indications    Chronic atrial fibrillation (H) [I48.2]             Anticoagulation Episode Summary     INR check location:       Preferred lab:       Send INR reminders to:    INR    Comments:         Anticoagulation Care Providers     Provider Role Specialty Phone number    Donte Mckeon MD [facility] [phone]            See the Encounter Report to view Anticoagulation Flowsheet and Dosing Calendar (Go to Encounters tab in chart review, and find the Anticoagulation Therapy Visit)        Saba Honeycutt RN                 
Detail Level: Detailed

## 2022-01-27 ENCOUNTER — LAB (OUTPATIENT)
Dept: LAB | Facility: OTHER | Age: 77
End: 2022-01-27
Attending: FAMILY MEDICINE
Payer: MEDICARE

## 2022-01-27 DIAGNOSIS — I50.812 CHRONIC RIGHT-SIDED HEART FAILURE (H): ICD-10-CM

## 2022-01-27 DIAGNOSIS — I50.82 BIVENTRICULAR HEART FAILURE (H): ICD-10-CM

## 2022-01-27 DIAGNOSIS — I27.20 PULMONARY HYPERTENSION (H): ICD-10-CM

## 2022-01-27 DIAGNOSIS — R06.02 SOB (SHORTNESS OF BREATH): ICD-10-CM

## 2022-01-27 LAB
CREAT SERPL-MCNC: 1.87 MG/DL (ref 0.7–1.3)
GFR SERPL CREATININE-BSD FRML MDRD: 37 ML/MIN/1.73M2
POTASSIUM BLD-SCNC: 4.5 MMOL/L (ref 3.5–5.1)

## 2022-01-27 PROCEDURE — 36415 COLL VENOUS BLD VENIPUNCTURE: CPT | Mod: ZL

## 2022-01-27 PROCEDURE — 84132 ASSAY OF SERUM POTASSIUM: CPT | Mod: ZL

## 2022-01-27 PROCEDURE — 82565 ASSAY OF CREATININE: CPT | Mod: ZL

## 2022-02-03 ENCOUNTER — LAB (OUTPATIENT)
Dept: LAB | Facility: OTHER | Age: 77
End: 2022-02-03
Attending: FAMILY MEDICINE
Payer: MEDICARE

## 2022-02-03 DIAGNOSIS — I27.20 PULMONARY HYPERTENSION (H): ICD-10-CM

## 2022-02-03 DIAGNOSIS — I50.82 BIVENTRICULAR HEART FAILURE (H): ICD-10-CM

## 2022-02-03 DIAGNOSIS — R06.02 SOB (SHORTNESS OF BREATH): ICD-10-CM

## 2022-02-03 DIAGNOSIS — I50.812 CHRONIC RIGHT-SIDED HEART FAILURE (H): ICD-10-CM

## 2022-02-03 LAB
CREAT SERPL-MCNC: 1.63 MG/DL (ref 0.7–1.3)
GFR SERPL CREATININE-BSD FRML MDRD: 43 ML/MIN/1.73M2
POTASSIUM BLD-SCNC: 4.1 MMOL/L (ref 3.5–5.1)

## 2022-02-03 PROCEDURE — 84132 ASSAY OF SERUM POTASSIUM: CPT | Mod: ZL

## 2022-02-03 PROCEDURE — 82565 ASSAY OF CREATININE: CPT | Mod: ZL

## 2022-02-03 PROCEDURE — 36415 COLL VENOUS BLD VENIPUNCTURE: CPT | Mod: ZL

## 2022-02-06 ENCOUNTER — OFFICE VISIT (OUTPATIENT)
Dept: FAMILY MEDICINE | Facility: OTHER | Age: 77
End: 2022-02-06
Attending: NURSE PRACTITIONER
Payer: COMMERCIAL

## 2022-02-06 VITALS
RESPIRATION RATE: 20 BRPM | SYSTOLIC BLOOD PRESSURE: 132 MMHG | DIASTOLIC BLOOD PRESSURE: 60 MMHG | WEIGHT: 198 LBS | OXYGEN SATURATION: 96 % | HEART RATE: 60 BPM | HEIGHT: 68 IN | TEMPERATURE: 97.4 F | BODY MASS INDEX: 30.01 KG/M2

## 2022-02-06 DIAGNOSIS — M25.521 PAIN AND SWELLING OF RIGHT ELBOW: ICD-10-CM

## 2022-02-06 DIAGNOSIS — M25.421 PAIN AND SWELLING OF RIGHT ELBOW: ICD-10-CM

## 2022-02-06 DIAGNOSIS — M10.9 ACUTE GOUTY ARTHRITIS: Primary | ICD-10-CM

## 2022-02-06 DIAGNOSIS — M25.442 FINGER JOINT SWELLING, LEFT: ICD-10-CM

## 2022-02-06 LAB
CRP SERPL-MCNC: 36.8 MG/L
URATE SERPL-MCNC: 11.2 MG/DL (ref 4.4–7.6)

## 2022-02-06 PROCEDURE — 36415 COLL VENOUS BLD VENIPUNCTURE: CPT | Mod: ZL | Performed by: NURSE PRACTITIONER

## 2022-02-06 PROCEDURE — 84550 ASSAY OF BLOOD/URIC ACID: CPT | Mod: ZL | Performed by: NURSE PRACTITIONER

## 2022-02-06 PROCEDURE — 99203 OFFICE O/P NEW LOW 30 MIN: CPT | Performed by: NURSE PRACTITIONER

## 2022-02-06 PROCEDURE — 86140 C-REACTIVE PROTEIN: CPT | Mod: ZL | Performed by: NURSE PRACTITIONER

## 2022-02-06 PROCEDURE — G0463 HOSPITAL OUTPT CLINIC VISIT: HCPCS

## 2022-02-06 RX ORDER — PREDNISONE 20 MG/1
TABLET ORAL
Qty: 20 TABLET | Refills: 0 | Status: SHIPPED | OUTPATIENT
Start: 2022-02-06 | End: 2022-03-10

## 2022-02-06 RX ORDER — AMLODIPINE BESYLATE 5 MG/1
TABLET ORAL
COMMUNITY
Start: 2021-12-11

## 2022-02-06 ASSESSMENT — ENCOUNTER SYMPTOMS
ARTHRALGIAS: 1
CONSTITUTIONAL NEGATIVE: 1
JOINT SWELLING: 1

## 2022-02-06 ASSESSMENT — PAIN SCALES - GENERAL: PAINLEVEL: EXTREME PAIN (8)

## 2022-02-06 ASSESSMENT — MIFFLIN-ST. JEOR: SCORE: 1597.62

## 2022-02-06 NOTE — NURSING NOTE
"Chief Complaint   Patient presents with     Pain     swelling right elbow, left pointer finger x 4 days         Initial /60 (BP Location: Left arm, Patient Position: Sitting, Cuff Size: Adult Regular)   Pulse 60   Temp 97.4  F (36.3  C) (Tympanic)   Resp 20   Ht 1.727 m (5' 8\")   Wt 89.8 kg (198 lb)   SpO2 96%   BMI 30.11 kg/m   Estimated body mass index is 30.11 kg/m  as calculated from the following:    Height as of this encounter: 1.727 m (5' 8\").    Weight as of this encounter: 89.8 kg (198 lb).       FOOD SECURITY SCREENING QUESTIONS:    The next two questions are to help us understand your food security.  If you are feeling you need any assistance in this area, we have resources available to support you today.    Hunger Vital Signs:  Within the past 12 months we worried whether our food would run out before we got money to buy more. Never  Within the past 12 months the food we bought just didn't last and we didn't have money to get more. Never      Medication Reconciliation: Complete      Krystle Jackson LPN  "

## 2022-02-06 NOTE — PROGRESS NOTES
"  Assessment & Plan   Problem List Items Addressed This Visit     None      Visit Diagnoses     Acute gouty arthritis    -  Primary    Relevant Medications    predniSONE (DELTASONE) 20 MG tablet    Pain and swelling of right elbow        Relevant Medications    predniSONE (DELTASONE) 20 MG tablet    Other Relevant Orders    Uric acid (Completed)    CRP inflammation (Completed)    Finger joint swelling, left        Relevant Medications    predniSONE (DELTASONE) 20 MG tablet    Other Relevant Orders    Uric acid (Completed)    CRP inflammation (Completed)         Uric acid and CRP are elevated.  Consistent with gouty arthritis.  Treated with prednisone taper, ongoing symptomatic management.  Close follow-up if symptoms are not improving.  Review of the result(s) of each unique test - CRP, uric acid  Ordering of each unique test  Prescription drug management  24 minutes spent on the date of the encounter doing chart review, history and exam, documentation and further activities per the note       BMI:   Estimated body mass index is 30.11 kg/m  as calculated from the following:    Height as of this encounter: 1.727 m (5' 8\").    Weight as of this encounter: 89.8 kg (198 lb).           No follow-ups on file.    BETI Rueda [facility]    Subjective   Pelon is a 76 year old who presents for the following health issues     HPI   He comes in today for 4 days of right elbow swelling and left index finger swelling.  He has not had any injuries to the area.  No fevers.  He has had gout in his left finger September of last year that presented very similarly.  He has been taking Tylenol for symptomatic management.      Review of Systems   Constitutional: Negative.    Musculoskeletal: Positive for arthralgias and joint swelling.   Skin: Negative.             Objective    /60 (BP Location: Left arm, Patient Position: Sitting, Cuff Size: Adult Regular)   Pulse 60   Temp 97.4  F (36.3  C) " "(Tympanic)   Resp 20   Ht 1.727 m (5' 8\")   Wt 89.8 kg (198 lb)   SpO2 96%   BMI 30.11 kg/m    Body mass index is 30.11 kg/m .  Physical Exam  Constitutional:       Appearance: Normal appearance.   Musculoskeletal:      Right elbow: Swelling present. Tenderness present.      Left elbow: Normal.      Right hand: Normal.      Comments: Right elbow warm to touch.  Left index finger with swelling, redness and tender to touch.   Neurological:      Mental Status: He is alert.          Results for orders placed or performed in visit on 02/06/22 (from the past 24 hour(s))   CRP inflammation   Result Value Ref Range    CRP Inflammation 36.8 (H) <10.0 mg/L   Uric acid   Result Value Ref Range    Uric Acid 11.2 (H) 4.4 - 7.6 mg/dL               "

## 2022-02-10 ENCOUNTER — ANTICOAGULATION THERAPY VISIT (OUTPATIENT)
Dept: ANTICOAGULATION | Facility: OTHER | Age: 77
End: 2022-02-10
Attending: FAMILY MEDICINE
Payer: MEDICARE

## 2022-02-10 ENCOUNTER — LAB (OUTPATIENT)
Dept: LAB | Facility: OTHER | Age: 77
End: 2022-02-10
Attending: FAMILY MEDICINE
Payer: MEDICARE

## 2022-02-10 DIAGNOSIS — I50.82 BIVENTRICULAR HEART FAILURE (H): ICD-10-CM

## 2022-02-10 DIAGNOSIS — I48.20 CHRONIC ATRIAL FIBRILLATION (H): Primary | ICD-10-CM

## 2022-02-10 DIAGNOSIS — R06.02 SOB (SHORTNESS OF BREATH): ICD-10-CM

## 2022-02-10 DIAGNOSIS — I27.20 PULMONARY HYPERTENSION (H): ICD-10-CM

## 2022-02-10 DIAGNOSIS — Z79.01 ANTICOAGULATION MONITORING, INR RANGE 2-3: ICD-10-CM

## 2022-02-10 DIAGNOSIS — I50.812 CHRONIC RIGHT-SIDED HEART FAILURE (H): ICD-10-CM

## 2022-02-10 LAB
CREAT SERPL-MCNC: 1.63 MG/DL (ref 0.7–1.3)
GFR SERPL CREATININE-BSD FRML MDRD: 43 ML/MIN/1.73M2
INR POINT OF CARE: 4.6 (ref 0.9–1.1)
POTASSIUM BLD-SCNC: 4 MMOL/L (ref 3.5–5.1)

## 2022-02-10 PROCEDURE — 82565 ASSAY OF CREATININE: CPT | Mod: ZL

## 2022-02-10 PROCEDURE — 36415 COLL VENOUS BLD VENIPUNCTURE: CPT | Mod: ZL

## 2022-02-10 PROCEDURE — 84132 ASSAY OF SERUM POTASSIUM: CPT | Mod: ZL

## 2022-02-10 PROCEDURE — 85610 PROTHROMBIN TIME: CPT | Mod: ZL,QW

## 2022-02-10 NOTE — PROGRESS NOTES
ANTICOAGULATION FOLLOW-UP CLINIC VISIT    Patient Name:  Angel Garrett  Date:  2/10/2022  Contact Type:  Face to Face    SUBJECTIVE:  Patient Findings     Positives:  Change in medications (doing tapering dose of prednisone for gout on day )        Clinical Outcomes     Negatives:  Major bleeding event, Thromboembolic event, Anticoagulation-related hospital admission, Anticoagulation-related ED visit, Anticoagulation-related fatality           OBJECTIVE    Recent labs: (last 7 days)     02/10/22  1035   INR 4.6*       ASSESSMENT / PLAN  INR assessment SUPRA    Recheck INR In: 1 WEEK    INR Location Clinic      Anticoagulation Summary  As of 2/10/2022    INR goal:  2.0-3.0   TTR:  70.5 % (1 y)   INR used for dosin.6 (2/10/2022)   Warfarin maintenance plan:  9 mg (3 mg x 3) every Sat; 6 mg (3 mg x 2) all other days   Full warfarin instructions:  2/10: Hold; : 6 mg; Otherwise 9 mg every Sat; 6 mg all other days   Weekly warfarin total:  45 mg   Plan last modified:  Saba Honeycutt RN (2020)   Next INR check:  2022   Priority:  High   Target end date:  Indefinite    Indications    Chronic atrial fibrillation (H) [I48.20]  Anticoagulation monitoring  INR range 2-3 [Z79.01]             Anticoagulation Episode Summary     INR check location:  Anticoagulation Clinic    Preferred lab:  [facility]    Send INR reminders to:  [facility]    Comments:        Anticoagulation Care Providers     Provider Role Specialty Phone number    Donte Mckeon MD [facility] [phone]            See the Encounter Report to view Anticoagulation Flowsheet and Dosing Calendar (Go to Encounters tab in chart review, and find the Anticoagulation Therapy Visit)        Saba Honeycutt, RN

## 2022-02-17 ENCOUNTER — LAB (OUTPATIENT)
Dept: LAB | Facility: OTHER | Age: 77
End: 2022-02-17
Attending: FAMILY MEDICINE
Payer: MEDICARE

## 2022-02-17 ENCOUNTER — ANTICOAGULATION THERAPY VISIT (OUTPATIENT)
Dept: ANTICOAGULATION | Facility: OTHER | Age: 77
End: 2022-02-17
Attending: FAMILY MEDICINE
Payer: MEDICARE

## 2022-02-17 DIAGNOSIS — R06.02 SOB (SHORTNESS OF BREATH): ICD-10-CM

## 2022-02-17 DIAGNOSIS — I50.82 BIVENTRICULAR HEART FAILURE (H): ICD-10-CM

## 2022-02-17 DIAGNOSIS — I50.812 CHRONIC RIGHT-SIDED HEART FAILURE (H): ICD-10-CM

## 2022-02-17 DIAGNOSIS — I48.20 CHRONIC ATRIAL FIBRILLATION (H): Primary | ICD-10-CM

## 2022-02-17 DIAGNOSIS — Z79.01 ANTICOAGULATION MONITORING, INR RANGE 2-3: ICD-10-CM

## 2022-02-17 DIAGNOSIS — I27.20 PULMONARY HYPERTENSION (H): ICD-10-CM

## 2022-02-17 LAB
CREAT SERPL-MCNC: 1.69 MG/DL (ref 0.7–1.3)
GFR SERPL CREATININE-BSD FRML MDRD: 41 ML/MIN/1.73M2
INR POINT OF CARE: 3 (ref 0.9–1.1)
POTASSIUM BLD-SCNC: 4 MMOL/L (ref 3.5–5.1)

## 2022-02-17 PROCEDURE — 36416 COLLJ CAPILLARY BLOOD SPEC: CPT | Mod: ZL

## 2022-02-17 PROCEDURE — 36415 COLL VENOUS BLD VENIPUNCTURE: CPT | Mod: ZL

## 2022-02-17 PROCEDURE — 84132 ASSAY OF SERUM POTASSIUM: CPT | Mod: ZL

## 2022-02-17 PROCEDURE — 85610 PROTHROMBIN TIME: CPT | Mod: ZL,QW

## 2022-02-17 PROCEDURE — 82565 ASSAY OF CREATININE: CPT | Mod: ZL

## 2022-02-17 NOTE — PROGRESS NOTES
ANTICOAGULATION FOLLOW-UP CLINIC VISIT    Patient Name:  Angel Garrett  Date:  2/17/2022  Contact Type:  Face to Face    SUBJECTIVE:  Patient Findings     Positives:  Change in medications (finishing with prednisone today)        Clinical Outcomes     Negatives:  Major bleeding event, Thromboembolic event, Anticoagulation-related hospital admission, Anticoagulation-related ED visit, Anticoagulation-related fatality           OBJECTIVE    Recent labs: (last 7 days)     02/17/22  1046   INR 3.0*       ASSESSMENT / PLAN  INR assessment THER    Recheck INR In: 1 WEEK    INR Location Clinic      Anticoagulation Summary  As of 2/17/2022    INR goal:  2.0-3.0   TTR:  68.6 % (1 y)   INR used for dosing:  3.0 (2/17/2022)   Warfarin maintenance plan:  9 mg (3 mg x 3) every Sat; 6 mg (3 mg x 2) all other days   Full warfarin instructions:  2/19: 6 mg; Otherwise 9 mg every Sat; 6 mg all other days   Weekly warfarin total:  45 mg   Plan last modified:  Saba Honeycutt RN (4/30/2020)   Next INR check:  2/24/2022   Priority:  High   Target end date:  Indefinite    Indications    Chronic atrial fibrillation (H) [I48.20]  Anticoagulation monitoring  INR range 2-3 [Z79.01]             Anticoagulation Episode Summary     INR check location:  Anticoagulation Clinic    Preferred lab:  [facility]    Send INR reminders to:  [facility]    Comments:        Anticoagulation Care Providers     Provider Role Specialty Phone number    Donte Mckeon MD [facility] [phone]            See the Encounter Report to view Anticoagulation Flowsheet and Dosing Calendar (Go to Encounters tab in chart review, and find the Anticoagulation Therapy Visit)        Saba Honeycutt, RN

## 2022-02-24 ENCOUNTER — ANTICOAGULATION THERAPY VISIT (OUTPATIENT)
Dept: ANTICOAGULATION | Facility: OTHER | Age: 77
End: 2022-02-24
Attending: FAMILY MEDICINE
Payer: MEDICARE

## 2022-02-24 ENCOUNTER — LAB (OUTPATIENT)
Dept: LAB | Facility: OTHER | Age: 77
End: 2022-02-24
Attending: FAMILY MEDICINE
Payer: MEDICARE

## 2022-02-24 DIAGNOSIS — I50.812 CHRONIC RIGHT-SIDED HEART FAILURE (H): ICD-10-CM

## 2022-02-24 DIAGNOSIS — R06.02 SOB (SHORTNESS OF BREATH): ICD-10-CM

## 2022-02-24 DIAGNOSIS — I27.20 PULMONARY HYPERTENSION (H): ICD-10-CM

## 2022-02-24 DIAGNOSIS — I50.82 BIVENTRICULAR HEART FAILURE (H): ICD-10-CM

## 2022-02-24 DIAGNOSIS — Z79.01 ANTICOAGULATION MONITORING, INR RANGE 2-3: ICD-10-CM

## 2022-02-24 DIAGNOSIS — I48.20 CHRONIC ATRIAL FIBRILLATION (H): Primary | ICD-10-CM

## 2022-02-24 LAB
CREAT SERPL-MCNC: 1.47 MG/DL (ref 0.7–1.3)
GFR SERPL CREATININE-BSD FRML MDRD: 49 ML/MIN/1.73M2
INR POINT OF CARE: 2.1 (ref 0.9–1.1)
POTASSIUM BLD-SCNC: 4.6 MMOL/L (ref 3.5–5.1)

## 2022-02-24 PROCEDURE — 85610 PROTHROMBIN TIME: CPT | Mod: ZL,QW

## 2022-02-24 PROCEDURE — 36416 COLLJ CAPILLARY BLOOD SPEC: CPT | Mod: ZL

## 2022-02-24 PROCEDURE — 36415 COLL VENOUS BLD VENIPUNCTURE: CPT | Mod: ZL

## 2022-02-24 PROCEDURE — 84132 ASSAY OF SERUM POTASSIUM: CPT | Mod: ZL

## 2022-02-24 PROCEDURE — 82565 ASSAY OF CREATININE: CPT | Mod: ZL

## 2022-02-24 NOTE — PROGRESS NOTES
ANTICOAGULATION FOLLOW-UP CLINIC VISIT    Patient Name:  Angel Garrett  Date:  2022  Contact Type:  Face to Face    SUBJECTIVE:  Patient Findings     Positives:  Upcoming invasive procedure (having tooth extraction next week)        Clinical Outcomes     Negatives:  Major bleeding event, Thromboembolic event, Anticoagulation-related hospital admission, Anticoagulation-related ED visit, Anticoagulation-related fatality           OBJECTIVE    Recent labs: (last 7 days)     22  1047   INR 2.1*       ASSESSMENT / PLAN  INR assessment THER    Recheck INR In: 1 WEEK    INR Location Clinic      Anticoagulation Summary  As of 2022    INR goal:  2.0-3.0   TTR:  68.6 % (1 y)   INR used for dosin.1 (2022)   Warfarin maintenance plan:  9 mg (3 mg x 3) every Sat; 6 mg (3 mg x 2) all other days   Full warfarin instructions:  : 6 mg; Otherwise 9 mg every Sat; 6 mg all other days   Weekly warfarin total:  45 mg   Plan last modified:  Saba Honeycutt RN (2020)   Next INR check:  3/3/2022   Priority:  High   Target end date:  Indefinite    Indications    Chronic atrial fibrillation (H) [I48.20]  Anticoagulation monitoring  INR range 2-3 [Z79.01]             Anticoagulation Episode Summary     INR check location:  Anticoagulation Clinic    Preferred lab:  [facility]    Send INR reminders to:  [facility]    Comments:        Anticoagulation Care Providers     Provider Role Specialty Phone number    Donte Mckeon MD [facility] [phone]            See the Encounter Report to view Anticoagulation Flowsheet and Dosing Calendar (Go to Encounters tab in chart review, and find the Anticoagulation Therapy Visit)        Saba Honeycutt, RN

## 2022-03-03 ENCOUNTER — LAB (OUTPATIENT)
Dept: LAB | Facility: OTHER | Age: 77
End: 2022-03-03
Attending: FAMILY MEDICINE
Payer: MEDICARE

## 2022-03-03 ENCOUNTER — ANTICOAGULATION THERAPY VISIT (OUTPATIENT)
Dept: ANTICOAGULATION | Facility: OTHER | Age: 77
End: 2022-03-03
Attending: FAMILY MEDICINE
Payer: MEDICARE

## 2022-03-03 DIAGNOSIS — Z79.01 ANTICOAGULATION MONITORING, INR RANGE 2-3: ICD-10-CM

## 2022-03-03 DIAGNOSIS — R06.02 SOB (SHORTNESS OF BREATH): ICD-10-CM

## 2022-03-03 DIAGNOSIS — I48.20 CHRONIC ATRIAL FIBRILLATION (H): Primary | ICD-10-CM

## 2022-03-03 DIAGNOSIS — I27.20 PULMONARY HYPERTENSION (H): ICD-10-CM

## 2022-03-03 DIAGNOSIS — I50.812 CHRONIC RIGHT-SIDED HEART FAILURE (H): ICD-10-CM

## 2022-03-03 DIAGNOSIS — I50.82 BIVENTRICULAR HEART FAILURE (H): ICD-10-CM

## 2022-03-03 DIAGNOSIS — D75.89 MACROCYTOSIS: ICD-10-CM

## 2022-03-03 LAB
CREAT SERPL-MCNC: 1.5 MG/DL (ref 0.7–1.3)
FOLATE SERPL-MCNC: 12.3 NG/ML
GFR SERPL CREATININE-BSD FRML MDRD: 48 ML/MIN/1.73M2
INR POINT OF CARE: 2.6 (ref 0.9–1.1)
POTASSIUM BLD-SCNC: 4.2 MMOL/L (ref 3.5–5.1)

## 2022-03-03 PROCEDURE — 84132 ASSAY OF SERUM POTASSIUM: CPT | Mod: ZL

## 2022-03-03 PROCEDURE — 36416 COLLJ CAPILLARY BLOOD SPEC: CPT | Mod: ZL

## 2022-03-03 PROCEDURE — 36415 COLL VENOUS BLD VENIPUNCTURE: CPT | Mod: ZL

## 2022-03-03 PROCEDURE — 82746 ASSAY OF FOLIC ACID SERUM: CPT | Mod: ZL

## 2022-03-03 PROCEDURE — 85610 PROTHROMBIN TIME: CPT | Mod: ZL,QW

## 2022-03-03 PROCEDURE — 82565 ASSAY OF CREATININE: CPT | Mod: ZL

## 2022-03-03 NOTE — PROGRESS NOTES
ANTICOAGULATION FOLLOW-UP CLINIC VISIT    Patient Name:  Angel Garrett  Date:  3/3/2022  Contact Type:  Face to Face    SUBJECTIVE:  Patient Findings         Clinical Outcomes     Negatives:  Major bleeding event, Thromboembolic event, Anticoagulation-related hospital admission, Anticoagulation-related ED visit, Anticoagulation-related fatality           OBJECTIVE    Recent labs: (last 7 days)     22  1047   INR 2.6*       ASSESSMENT / PLAN  INR assessment THER    Recheck INR In: 4 WEEKS    INR Location Clinic      Anticoagulation Summary  As of 3/3/2022    INR goal:  2.0-3.0   TTR:  68.6 % (1 y)   INR used for dosin.6 (3/3/2022)   Warfarin maintenance plan:  9 mg (3 mg x 3) every Sat; 6 mg (3 mg x 2) all other days   Full warfarin instructions:  9 mg every Sat; 6 mg all other days   Weekly warfarin total:  45 mg   No change documented:  Isabelle Turner RN   Plan last modified:  Saba Honeycutt RN (2020)   Next INR check:  3/31/2022   Priority:  High   Target end date:  Indefinite    Indications    Chronic atrial fibrillation (H) [I48.20]  Anticoagulation monitoring  INR range 2-3 [Z79.01]             Anticoagulation Episode Summary     INR check location:  Anticoagulation Clinic    Preferred lab:  [facility]    Send INR reminders to:  [facility]    Comments:        Anticoagulation Care Providers     Provider Role Specialty Phone number    Donte Mckeon MD [facility] [phone]            See the Encounter Report to view Anticoagulation Flowsheet and Dosing Calendar (Go to Encounters tab in chart review, and find the Anticoagulation Therapy Visit)        Isabelle Turner, PAIGE

## 2022-03-07 ENCOUNTER — TELEPHONE (OUTPATIENT)
Dept: LAB | Facility: OTHER | Age: 77
End: 2022-03-07
Payer: COMMERCIAL

## 2022-03-07 DIAGNOSIS — N18.31 STAGE 3A CHRONIC KIDNEY DISEASE (H): Primary | ICD-10-CM

## 2022-03-07 NOTE — TELEPHONE ENCOUNTER
He is on high dose of his Torsemide. [place] wanted him to have his Creatinine and Potassium checked weekly through the end of March can you put in orders for that?  Cyndee Berry LPN..................3/7/2022   2:23 PM

## 2022-03-08 NOTE — TELEPHONE ENCOUNTER
After the patient's name and date of birth was verified, the patient was told the below information.  Cyndee Berry LPN..................3/7/2022   7:37 PM

## 2022-03-10 ENCOUNTER — OFFICE VISIT (OUTPATIENT)
Dept: FAMILY MEDICINE | Facility: OTHER | Age: 77
End: 2022-03-10
Attending: PHYSICIAN ASSISTANT
Payer: MEDICARE

## 2022-03-10 ENCOUNTER — LAB (OUTPATIENT)
Dept: LAB | Facility: OTHER | Age: 77
End: 2022-03-10
Attending: FAMILY MEDICINE
Payer: COMMERCIAL

## 2022-03-10 VITALS
HEART RATE: 56 BPM | TEMPERATURE: 96.8 F | OXYGEN SATURATION: 98 % | BODY MASS INDEX: 30.41 KG/M2 | SYSTOLIC BLOOD PRESSURE: 138 MMHG | WEIGHT: 200 LBS | DIASTOLIC BLOOD PRESSURE: 78 MMHG | RESPIRATION RATE: 18 BRPM

## 2022-03-10 DIAGNOSIS — N18.31 STAGE 3A CHRONIC KIDNEY DISEASE (H): ICD-10-CM

## 2022-03-10 DIAGNOSIS — M70.21 OLECRANON BURSITIS, RIGHT ELBOW: Primary | ICD-10-CM

## 2022-03-10 LAB
CREAT SERPL-MCNC: 1.49 MG/DL (ref 0.7–1.3)
GFR SERPL CREATININE-BSD FRML MDRD: 48 ML/MIN/1.73M2
POTASSIUM BLD-SCNC: 4.1 MMOL/L (ref 3.5–5.1)

## 2022-03-10 PROCEDURE — 82565 ASSAY OF CREATININE: CPT | Mod: ZL

## 2022-03-10 PROCEDURE — 36415 COLL VENOUS BLD VENIPUNCTURE: CPT | Mod: ZL

## 2022-03-10 PROCEDURE — 84132 ASSAY OF SERUM POTASSIUM: CPT | Mod: ZL

## 2022-03-10 PROCEDURE — 99213 OFFICE O/P EST LOW 20 MIN: CPT | Performed by: PHYSICIAN ASSISTANT

## 2022-03-10 PROCEDURE — G0463 HOSPITAL OUTPT CLINIC VISIT: HCPCS

## 2022-03-10 ASSESSMENT — PAIN SCALES - GENERAL: PAINLEVEL: NO PAIN (0)

## 2022-03-10 NOTE — PATIENT INSTRUCTIONS
Patient Education     Bursitis of the Elbow (Olecranon)   Your elbow contains a small fluid-filled sac called a bursa. The bursa helps the muscles and tendons move smoothly over the bone. It also cushions and protects your elbow. Bursitis is when the bursa is inflamed or swollen. This is most often due to overuse of, or injury to, the elbow. Symptoms include swelling and pain. If the elbow is red and feels warm to the touch, the bursa itself may be infected.  In most cases, elbow bursitis goes away with medicine and self-care at home. It may take several weeks for the bursa to heal and the swelling to go away. In some cases, your healthcare provider may drain extra fluid from the bursa. Or they may inject medicine directly into the bursa to help relieve symptoms. In severe cases, you may need surgery to remove the bursa. If there is concern that the bursa is infected, your healthcare provider may prescribe antibiotics to treat the infection.    Home care  Your healthcare provider may prescribe medicine to help relieve pain and swelling. This may be an over-the-counter pain reliever or prescription pain medicine. Take all medicines as directed. To help treat or prevent infection, your healthcare provider may prescribe antibiotics. If these are prescribed, take them as directed until they are gone.  The following are general care guidelines:    Apply an ice pack or bag of frozen peas wrapped in a thin towel to your elbow for 15 to 20 minutes at a time. Do this 3 to 4 times a day until pain and swelling improve.    Keep your elbow raised above the level of your heart whenever possible. This helps reduce swelling. When sitting or lying down, place your arm on a pillow that rests on your chest or on a pillow at your side.    Use an elastic wrap around the elbow joint to compress the area while it is healing. Make the wrap snug but not tight to the point of causing pain.    Rest your elbow to give it time to heal. You  may need to wear an elbow pad to help protect and limit the movement of your elbow. During and after healing, avoid leaning on your elbows.  Follow-up care  Follow up with your healthcare provider, or as advised. If you have been referred to a specialist, make that appointment promptly.  When to seek medical advice  Call your healthcare provider right away if any of these occur:    Fever of 100.4 F (38 C) or higher, or as advised by your healthcare provider    Chills    Increased pain, swelling, warmth, redness, or drainage from the joint    Trouble moving the elbow joint    Numbness or tingling in the hand    Severe pain or swelling in forearm or hand    Loss of pink color and slow return of color after squeezing fingertip or hand  Telly last reviewed this educational content on 9/1/2019 2000-2021 [employer]. All rights reserved. This information is not intended as a substitute for professional medical care. Always follow your healthcare professional's instructions.

## 2022-03-10 NOTE — PROGRESS NOTES
ASSESSMENT/PLAN:    I have reviewed the nursing notes.  I have reviewed the findings, diagnosis, plan and need for follow up with the patient.    1. Olecranon bursitis, right elbow  - Vitals stable, afebrile. Olecranon bursitis to right elbow. Discussed typical causes and treatment plans. Declined lab work today (uric acid, CBC, etc.), agreeable to follow up if symptoms persist. In interim, recommend Tylenol (as unable to take NSAIDs), compression with ACE wrap (this was applied prior to leaving [facility]), elevation of elbow, avoid direct pressure to site on hard surfaces (will try to prop arm on pillow if needing to apply direct pressure to site), heat/ice as needed. Discussed we typically do not drain bursa and intralesional corticosteroids are not routinely recommended. Would prefer to avoid oral Prednisone today as recent course of medication x 1 month prior. Agreeable to follow up with fevers, chills, numbness/tingling, worsening edema and other concerning symptoms. Patient is in agreement and understanding of the above treatment plan. All questions and concerns were addressed and answered to patient's satisfaction. AVS reviewed with patient.     Discussed warning signs/symptoms indicative of need to f/u    Follow up if symptoms persist or worsen or concerns    I explained my diagnostic considerations and recommendations to the patient, who voiced understanding and agreement with the treatment plan. All questions were answered. We discussed potential side effects of any prescribed or recommended therapies, as well as expectations for response to treatments.    Isabelle Mccann PA-C  3/10/2022  11:48 AM    HPI:    Angel Garrett is a 76 year old male  who presents to [facility] today for concerns of right elbow swelling x 4-5 days duration. He notes no fevers, chills, redness to site. He does note history of gout x 1 month prior, he was on Prednisone for this and tolerated well. No known injuries, does  report that he does commonly rest his elbow on recliner and table surfaces (direct pressure to site). Denies numbness, tingling, changes to range of motion/strength. He takes Tylenol as needed. No history of olecranon bursitis. No additional symptoms to report.     Unable to take NSAIDs due to cardiac history - follows with PCP and [facility] in regards to this.     No past medical history on file.  Past Surgical History:   Procedure Laterality Date     COLONOSCOPY  2009,Colonography     OTHER SURGICAL HISTORY      ,,OPEN CHOLECYSTECTOMY     OTHER SURGICAL HISTORY      ,81161,CARDIAC SURGERY,Pericardial patch, closure of ASD, repair of PFO and repair of anomalous pulmonary venous drainage.     OTHER SURGICAL HISTORY      2016,ABY875,CARDIAC CATHETERIZATION     Right total hip arthroplasty Right 10/2018    Demario Green MD,  [facility]     VASECTOMY      No Comments Provided     Social History     Tobacco Use     Smoking status: Former Smoker     Quit date: 1984     Years since quittin.5     Smokeless tobacco: Never Used   Substance Use Topics     Alcohol use: Yes     Alcohol/week: 5.8 standard drinks     Comment: Alcoholic Drinks/day: 1 drinks a day     Current Outpatient Medications   Medication Sig Dispense Refill     acetaminophen (TYLENOL) 325 MG tablet Take 2 tablets (650 mg) by mouth every 4 hours as needed for mild pain (Patient taking differently: Take 650 mg by mouth 2 times daily as needed for mild pain ) 100 tablet 1     amLODIPine (NORVASC) 5 MG tablet        digoxin (LANOXIN) 250 MCG tablet TAKE 1 TABLET (250 MCG) BY MOUTH DAILY 90 tablet 11     fish oil-omega-3 fatty acids 1000 MG capsule Take 2 g by mouth daily       Glucosamine Sulfate 1000 MG TABS Take 2 tablets by mouth       metoprolol succinate ER (TOPROL-XL) 50 MG 24 hr tablet TAKE 1 TABLET BY MOUTH EVERY DAY 90 tablet 1     predniSONE (DELTASONE) 20 MG tablet Take 3 tabs by mouth daily x 3 days,  then 2 tabs daily x 3 days, then 1 tab daily x 3 days, then 1/2 tab daily x 3 days. 20 tablet 0     Tadalafil, PAH, (ADCIRCA PO) Take 20 mg by mouth 2 times daily       torsemide (DEMADEX) 20 MG tablet Take two tablets in the am and one in the pm 180 tablet 8     warfarin ANTICOAGULANT (COUMADIN) 3 MG tablet TAKE 9 MG X 1 DAY AND 6 MG X 6 DAYS/WEEK. OR AS DIRECTED BY THE [facility] of [facility]. 200 tablet 1     Allergies   Allergen Reactions     Pollen Extract Other (See Comments)     Nasal congestion  Nasal congestion     Past medical history, past surgical history, current medications and allergies reviewed and accurate to the best of my knowledge.      ROS:  Refer to HPI    /78 (BP Location: Right arm, Patient Position: Sitting, Cuff Size: Adult Regular)   Pulse 56   Temp 96.8  F (36  C) (Tympanic)   Resp 18   Wt 90.7 kg (200 lb)   SpO2 98%   BMI 30.41 kg/m      EXAM:  General Appearance: Well appearing 76 year old male, appropriate appearance for age. No acute distress   Respiratory: normal chest wall and respirations.  Normal effort.  Clear to auscultation bilaterally, no wheezing, crackles or rhonchi.  No increased work of breathing.  No cough appreciated.  Cardiac: RRR with no murmurs  MSK:  RIGHT ELBOW PHYSICAL EXAMINATION:  Inspection: skin is clean, dry and intact. No signs of prior or recent trauma. No bony deformities noted. +, no erythema or calor, non tender. No valgus/varus deformities of the elbow noted.    Palpation: no soft tissue or bony tenderness    ROM: flexion full, extension full, pronation full and supination full.     Special Testing:   Valgus (UCL): stable to ligamentous stressing   Varus (RCL): stable to ligamentous stressing     Special testing if suspect medial/lateral epicondylitis:   Long finger test: negative   Cozen test: negative    Mill's test: negative    Medial epicondylitis: negative     Neurovascular Status: distal pulses and sensation intact.   Dermatological: no rashes  noted of exposed skin  Psychological: normal affect, alert, oriented, and pleasant.     Labs:  None     Xray:  None

## 2022-03-10 NOTE — NURSING NOTE
"Chief Complaint   Patient presents with     Mass     right elbow mass, noticed 5 days ago, no change since noticed, recent history of gout in elbow       Initial /78 (BP Location: Right arm, Patient Position: Sitting, Cuff Size: Adult Regular)   Pulse 56   Temp 96.8  F (36  C) (Tympanic)   Resp 18   Wt 90.7 kg (200 lb)   SpO2 98%   BMI 30.41 kg/m   Estimated body mass index is 30.41 kg/m  as calculated from the following:    Height as of 2/6/22: 1.727 m (5' 8\").    Weight as of this encounter: 90.7 kg (200 lb).  Medication Reconciliation: complete    Fadia Lua LPN    "

## 2022-03-11 DIAGNOSIS — I48.20 CHRONIC ATRIAL FIBRILLATION (H): Primary | ICD-10-CM

## 2022-03-11 NOTE — TELEPHONE ENCOUNTER
" Disp Refills Start End AYSHA   digoxin (LANOXIN) 250 MCG tablet 90 tablet 11 12/14/2020  No   Sig - Route: TAKE 1 TABLET (250 MCG) BY MOUTH DAILY - Oral       LOV: 2/6/2022  Future Office visit: No future appointment scheduled at this time.      Routing refill request to provider for review/approval.    Requested Prescriptions   Pending Prescriptions Disp Refills     digoxin (LANOXIN) 250 MCG tablet [Pharmacy Med Name: DIGOXIN 250 MCG TABLET] 90 tablet 11     Sig: TAKE 1 TABLET (250 MCG) BY MOUTH DAILY       Cardiac Glycoside Agents Protocol Failed - 3/11/2022 12:32 AM        Failed - Normal digoxin level on file in past 12 mos     No lab results found.    Invalid input(s):  [id number]          Failed - Normal serum creatinine on file in past 12 mos     Recent Labs   Lab Test 03/10/22  1111   CR 1.49*       Ok to refill medication if creatinine is low          Failed - Recent (6 mo) or future (30 days) visit within the authorizing provider's specialty     Patient had office visit in the last 6 months or has a visit in the next 30 days with authorizing provider or within the authorizing provider's specialty.  See \"Patient Info\" tab in inbasket, or \"Choose Columns\" in Meds & Orders section of the refill encounter.            Passed - Medication is active on med list        Passed - Patient is 18 years of age or older         Routed to provider for review and consideration. Rhonda Larry RN  ....................  3/11/2022   11:50 AM      "

## 2022-03-14 RX ORDER — DIGOXIN 250 MCG
250 TABLET ORAL DAILY
Qty: 90 TABLET | Refills: 11 | Status: SHIPPED | OUTPATIENT
Start: 2022-03-14 | End: 2022-07-20

## 2022-03-17 ENCOUNTER — LAB (OUTPATIENT)
Dept: LAB | Facility: OTHER | Age: 77
End: 2022-03-17
Attending: FAMILY MEDICINE
Payer: MEDICARE

## 2022-03-17 DIAGNOSIS — N18.31 STAGE 3A CHRONIC KIDNEY DISEASE (H): ICD-10-CM

## 2022-03-17 LAB
CREAT SERPL-MCNC: 1.65 MG/DL (ref 0.7–1.3)
GFR SERPL CREATININE-BSD FRML MDRD: 43 ML/MIN/1.73M2
POTASSIUM BLD-SCNC: 4 MMOL/L (ref 3.5–5.1)

## 2022-03-17 PROCEDURE — 84132 ASSAY OF SERUM POTASSIUM: CPT | Mod: ZL

## 2022-03-17 PROCEDURE — 82565 ASSAY OF CREATININE: CPT | Mod: ZL

## 2022-03-17 PROCEDURE — 36415 COLL VENOUS BLD VENIPUNCTURE: CPT | Mod: ZL

## 2022-03-31 ENCOUNTER — LAB (OUTPATIENT)
Dept: LAB | Facility: OTHER | Age: 77
End: 2022-03-31
Attending: FAMILY MEDICINE
Payer: MEDICARE

## 2022-03-31 ENCOUNTER — ANTICOAGULATION THERAPY VISIT (OUTPATIENT)
Dept: ANTICOAGULATION | Facility: OTHER | Age: 77
End: 2022-03-31
Attending: FAMILY MEDICINE
Payer: MEDICARE

## 2022-03-31 DIAGNOSIS — N18.31 STAGE 3A CHRONIC KIDNEY DISEASE (H): ICD-10-CM

## 2022-03-31 DIAGNOSIS — Z79.01 ANTICOAGULATION MONITORING, INR RANGE 2-3: ICD-10-CM

## 2022-03-31 DIAGNOSIS — I48.20 CHRONIC ATRIAL FIBRILLATION (H): Primary | ICD-10-CM

## 2022-03-31 LAB
CREAT SERPL-MCNC: 1.49 MG/DL (ref 0.7–1.3)
GFR SERPL CREATININE-BSD FRML MDRD: 48 ML/MIN/1.73M2
INR POINT OF CARE: 2.7 (ref 0.9–1.1)
POTASSIUM BLD-SCNC: 4.2 MMOL/L (ref 3.5–5.1)

## 2022-03-31 PROCEDURE — 84132 ASSAY OF SERUM POTASSIUM: CPT | Mod: ZL

## 2022-03-31 PROCEDURE — 36415 COLL VENOUS BLD VENIPUNCTURE: CPT | Mod: ZL

## 2022-03-31 PROCEDURE — 85610 PROTHROMBIN TIME: CPT | Mod: ZL,QW

## 2022-03-31 PROCEDURE — 82565 ASSAY OF CREATININE: CPT | Mod: ZL

## 2022-03-31 NOTE — PROGRESS NOTES
ANTICOAGULATION FOLLOW-UP CLINIC VISIT    Patient Name:  Angel Garrett  Date:  3/31/2022  Contact Type:  Face to Face    SUBJECTIVE:  Patient Findings         Clinical Outcomes     Negatives:  Major bleeding event, Thromboembolic event, Anticoagulation-related hospital admission, Anticoagulation-related ED visit, Anticoagulation-related fatality           OBJECTIVE    Recent labs: (last 7 days)     22  1126   INR 2.7*       ASSESSMENT / PLAN  INR assessment THER    Recheck INR In: 6 WEEKS    INR Location Clinic      Anticoagulation Summary  As of 3/31/2022    INR goal:  2.0-3.0   TTR:  68.6 % (1 y)   INR used for dosin.7 (3/31/2022)   Warfarin maintenance plan:  9 mg (3 mg x 3) every Sat; 6 mg (3 mg x 2) all other days   Full warfarin instructions:  9 mg every Sat; 6 mg all other days   Weekly warfarin total:  45 mg   No change documented:  Saba Honeycutt RN   Plan last modified:  Saba Honeycutt RN (2020)   Next INR check:  2022   Priority:  Maintenance   Target end date:  Indefinite    Indications    Chronic atrial fibrillation (H) [I48.20]  Anticoagulation monitoring  INR range 2-3 [Z79.01]             Anticoagulation Episode Summary     INR check location:  Anticoagulation Clinic    Preferred lab:  [facility]    Send INR reminders to:  [facility]    Comments:        Anticoagulation Care Providers     Provider Role Specialty Phone number    Donte Mckeon MD [facility] [phone]            See the Encounter Report to view Anticoagulation Flowsheet and Dosing Calendar (Go to Encounters tab in chart review, and find the Anticoagulation Therapy Visit)        Saba Honeycutt RN

## 2022-05-11 ENCOUNTER — ANTICOAGULATION THERAPY VISIT (OUTPATIENT)
Dept: ANTICOAGULATION | Facility: OTHER | Age: 77
End: 2022-05-11
Payer: MEDICARE

## 2022-05-11 ENCOUNTER — LAB (OUTPATIENT)
Dept: LAB | Facility: OTHER | Age: 77
End: 2022-05-11
Attending: FAMILY MEDICINE
Payer: MEDICARE

## 2022-05-11 DIAGNOSIS — Z79.01 ANTICOAGULATION MONITORING, INR RANGE 2-3: ICD-10-CM

## 2022-05-11 DIAGNOSIS — N18.31 STAGE 3A CHRONIC KIDNEY DISEASE (H): ICD-10-CM

## 2022-05-11 DIAGNOSIS — I48.20 CHRONIC ATRIAL FIBRILLATION (H): Primary | ICD-10-CM

## 2022-05-11 LAB
CREAT SERPL-MCNC: 1.45 MG/DL (ref 0.7–1.3)
GFR SERPL CREATININE-BSD FRML MDRD: 50 ML/MIN/1.73M2
INR POINT OF CARE: 3.3 (ref 0.9–1.1)
POTASSIUM BLD-SCNC: 3.5 MMOL/L (ref 3.5–5.1)

## 2022-05-11 PROCEDURE — 84132 ASSAY OF SERUM POTASSIUM: CPT | Mod: ZL

## 2022-05-11 PROCEDURE — 82565 ASSAY OF CREATININE: CPT | Mod: ZL

## 2022-05-11 PROCEDURE — 36415 COLL VENOUS BLD VENIPUNCTURE: CPT | Mod: ZL

## 2022-05-11 PROCEDURE — 85610 PROTHROMBIN TIME: CPT | Mod: ZL,QW

## 2022-05-11 NOTE — PROGRESS NOTES
ANTICOAGULATION MANAGEMENT     Angel Garrett 76 year old male is on warfarin with supratherapeutic INR result. (Goal INR 2.0-3.0)    Recent labs: (last 7 days)     05/11/22  1455   INR 3.3*       ASSESSMENT       Source(s): Chart Review and in person       Warfarin doses taken: Warfarin taken as instructed    Diet: says he was camping last weekend not eating any greens    New illness, injury, or hospitalization: No    Medication/supplement changes: None noted    Signs or symptoms of bleeding or clotting: No    Previous INR: Therapeutic last 2(+) visits    Additional findings: None       PLAN     Recommended plan for temporary change(s) affecting INR     Dosing Instructions: continue your current warfarin dose with next INR in 4 weeks       Summary  As of 5/11/2022    Full warfarin instructions:  9 mg every Sat; 6 mg all other days   Next INR check:  6/8/2022             in person    Patient elected to schedule next visit 6/8/22    Education provided: Written instructions provided    Plan made per [facility] anticoagulation protocol    Saba Honeycutt RN  Anticoagulation Clinic  5/11/2022    _______________________________________________________________________     Anticoagulation Episode Summary     Current INR goal:  2.0-3.0   TTR:  63.0 % (1 y)   Target end date:  Indefinite   Send INR reminders to:  [facility]    Indications    Chronic atrial fibrillation (H) [I48.20]  Anticoagulation monitoring  INR range 2-3 [Z79.01]           Comments:           Anticoagulation Care Providers     Provider Role Specialty Phone number    Donte Mckeon MD Referring Family Medicine [phone]

## 2022-06-12 DIAGNOSIS — Z79.01 LONG TERM CURRENT USE OF ANTICOAGULANT THERAPY: ICD-10-CM

## 2022-06-12 DIAGNOSIS — I48.20 CHRONIC ATRIAL FIBRILLATION (H): ICD-10-CM

## 2022-06-12 DIAGNOSIS — I10 ESSENTIAL HYPERTENSION: ICD-10-CM

## 2022-06-13 NOTE — TELEPHONE ENCOUNTER
[facility] in #[id number] in [facility] [place] sent Rx request for the following:      Requested Prescriptions   Pending Prescriptions Disp Refills     metoprolol succinate ER (TOPROL XL) 50 MG 24 hr tablet [Pharmacy Med Name: METOPROLOL SUCC ER 50 MG TAB] 90 tablet 1     Sig: TAKE 1 TABLET BY MOUTH EVERY DAY   Last Prescription Date:   12/8/21  Last Fill Qty/Refills:         90, R-1    Last Office Visit:              5/13/21   Future Office visit:           None    Pt due for annual exam. Routing to provider for refill consideration. Routing to  OUTREACH APPT REQUESTS pool, to assist Pt in scheduling appointment.     In clinical absence of patient's primary, Donte Mckeon, patient is requesting that this message be sent to the covering provider for consideration please.    Krystle Sargent RN .............. 6/13/2022  3:28 PM

## 2022-06-14 RX ORDER — WARFARIN SODIUM 3 MG/1
TABLET ORAL
Qty: 200 TABLET | Refills: 0 | Status: SHIPPED | OUTPATIENT
Start: 2022-06-14 | End: 2022-07-20

## 2022-06-14 RX ORDER — METOPROLOL SUCCINATE 50 MG/1
TABLET, EXTENDED RELEASE ORAL
Qty: 90 TABLET | Refills: 1 | Status: SHIPPED | OUTPATIENT
Start: 2022-06-14 | End: 2022-07-20

## 2022-06-14 NOTE — TELEPHONE ENCOUNTER
[facility] sent Rx request for the following:      METOPROLOL SUCC ER 50 MG TAB      Last Prescription Date:   12/8/2021  Last Fill Qty/Refills:         90, R-1    Last Office Visit:              2/6/2022   Future Office visit:           none       WARFARIN SODIUM 3 MG TABLET      Last Prescription Date:   12/8/2021  Last Fill Qty/Refills:         200, R-1    Last Office Visit:              5/11/2022   Future Office visit:           none    Santiago Sloan RN, BSN  ....................  6/14/2022   11:00 AM

## 2022-07-01 ENCOUNTER — TELEPHONE (OUTPATIENT)
Dept: FAMILY MEDICINE | Facility: OTHER | Age: 77
End: 2022-07-01

## 2022-07-01 NOTE — TELEPHONE ENCOUNTER
Patient informed Dr. Mckeon cannot work patient in for a Medicare Wellness exam. He asked if another provider can see him sooner. Patient transferred to scheduling to schedule.     Deloris Woods CMA on 7/1/2022 at 9:06 AM

## 2022-07-01 NOTE — TELEPHONE ENCOUNTER
Patient called wanting to get a medicare wellness for him and his wife(Raiza), before 07/22.  They are moving. Patient and wife are aware that [facility] has nothing till August.  But they insist on a call from nurse.  Please call    Janice Danielle on 7/1/2022 at 8:08 AM

## 2022-07-07 ENCOUNTER — NURSE TRIAGE (OUTPATIENT)
Dept: FAMILY MEDICINE | Facility: OTHER | Age: 77
End: 2022-07-07

## 2022-07-07 NOTE — TELEPHONE ENCOUNTER
While he is at high risk, he is vaccinated and with the current variant it is unlikely that he will have worsening or significant symptoms.  While the antiviral is a possibility, it does have a lot of drug interactions and I think it would be reasonable for him to ride this out.  We have up to day 5 from onset of symptoms to use the medication so if he feels that he is getting worse in the next day or 2, it would certainly be reasonable to have a telephone visit with someone to reconsider this.  Donte Mckeon MD on 7/7/2022 at 2:13 PM

## 2022-07-07 NOTE — TELEPHONE ENCOUNTER
Dr. Mckeon,   Patient states he would like your input as to whether he should take the COVID antiviral Rx given his history of heart and lung disease. Patient states he tested positive yesterday, symptom onset two days ago but very mild complaining only of a raspy throat and mild phlegm production. Please advise.   [facility]  Tatiana Suarez RN on 7/7/2022 at 1:58 PM      Reason for Disposition    HIGH RISK for severe COVID complications (e.g., weak immune system, age > 64 years, obesity with BMI > 25, pregnant, chronic lung disease or other chronic medical condition) (Exception: Already seen by PCP and no new or worsening symptoms.)    Additional Information    Negative: SEVERE difficulty breathing (e.g., struggling for each breath, speaks in single words)    Negative: Difficult to awaken or acting confused (e.g., disoriented, slurred speech)    Negative: Bluish (or gray) lips or face now    Negative: Shock suspected (e.g., cold/pale/clammy skin, too weak to stand, low BP, rapid pulse)    Negative: Sounds like a life-threatening emergency to the triager    Negative: [1] Diagnosed or suspected COVID-19 AND [2] symptoms lasting 3 or more weeks    Negative: [1] COVID-19 exposure AND [2] no symptoms    Negative: COVID-19 vaccine reaction suspected (e.g., fever, headache, muscle aches) occurring 1 to 3 days after getting vaccine    Negative: COVID-19 vaccine, questions about    Negative: [1] Lives with someone known to have influenza (flu test positive) AND [2] flu-like symptoms (e.g., cough, runny nose, sore throat, SOB; with or without fever)    Negative: [1] Adult with possible COVID-19 symptoms AND [2] triager concerned about severity of symptoms or other causes    Negative: COVID-19 and breastfeeding, questions about    Negative: SEVERE or constant chest pain or pressure  (Exception: Mild central chest pain, present only when coughing.)    Negative: MODERATE difficulty breathing (e.g., speaks in phrases,  "SOB even at rest, pulse 100-120)    Negative: Headache and stiff neck (can't touch chin to chest)    Negative: Oxygen level (e.g., pulse oximetry) 90 percent or lower    Negative: Chest pain or pressure    Negative: Patient sounds very sick or weak to the triager    Answer Assessment - Initial Assessment Questions  1. COVID-19 DIAGNOSIS: \"Who made your COVID-19 diagnosis?\" \"Was it confirmed by a positive lab test or self-test?\" If not diagnosed by a doctor (or NP/PA), ask \"Are there lots of cases (community spread) where you live?\" Note: See [facility] health department website, if unsure.      Home test yesterday  2. COVID-19 EXPOSURE: \"Was there any known exposure to COVID before the symptoms began?\" CDC Definition of close contact: within 6 feet (2 meters) for a total of 15 minutes or more over a 24-hour period.       n/a  3. ONSET: \"When did the COVID-19 symptoms start?\"       Two days ago  4. WORST SYMPTOM: \"What is your worst symptom?\" (e.g., cough, fever, shortness of breath, muscle aches)      Raspy voice and phlegm  5. COUGH: \"Do you have a cough?\" If Yes, ask: \"How bad is the cough?\"        denies  6. FEVER: \"Do you have a fever?\" If Yes, ask: \"What is your temperature, how was it measured, and when did it start?\"      denies  7. RESPIRATORY STATUS: \"Describe your breathing?\" (e.g., shortness of breath, wheezing, unable to speak)       denies  8. BETTER-SAME-WORSE: \"Are you getting better, staying the same or getting worse compared to yesterday?\"  If getting worse, ask, \"In what way?\"      same  9. HIGH RISK DISEASE: \"Do you have any chronic medical problems?\" (e.g., asthma, heart or lung disease, weak immune system, obesity, etc.)      Pulmonary htn, leaky tricuspid valve  10. VACCINE: \"Have you had the COVID-19 vaccine?\" If Yes, ask: \"Which one, how many shots, when did you get it?\"        yes  11. BOOSTER: \"Have you received your COVID-19 booster?\" If Yes, ask: \"Which one and when did you get it?\"        " "yes  12. PREGNANCY: \"Is there any chance you are pregnant?\" \"When was your last menstrual period?\"        no  13. OTHER SYMPTOMS: \"Do you have any other symptoms?\"  (e.g., chills, fatigue, headache, loss of smell or taste, muscle pain, sore throat)        denies  14. O2 SATURATION MONITOR:  \"Do you use an oxygen saturation monitor (pulse oximeter) at home?\" If Yes, ask \"What is your reading (oxygen level) today?\" \"What is your usual oxygen saturation reading?\" (e.g., 95%)        no    Protocols used: CORONAVIRUS (COVID-19) DIAGNOSED OR [id number] 1.18.2022    "

## 2022-07-07 NOTE — TELEPHONE ENCOUNTER
Patient wants to talk to the doctor about some symptoms he is having like raspy throat, cold.  Did a home covid test and it was positive. Wants to know if he should wait it out and has some other questions. Please call    Janice Danielle on 7/7/2022 at 1:22 PM

## 2022-07-16 ENCOUNTER — HEALTH MAINTENANCE LETTER (OUTPATIENT)
Age: 77
End: 2022-07-16

## 2022-07-20 ENCOUNTER — OFFICE VISIT (OUTPATIENT)
Dept: FAMILY MEDICINE | Facility: OTHER | Age: 77
End: 2022-07-20
Attending: PHYSICIAN ASSISTANT
Payer: COMMERCIAL

## 2022-07-20 ENCOUNTER — ANTICOAGULATION THERAPY VISIT (OUTPATIENT)
Dept: ANTICOAGULATION | Facility: OTHER | Age: 77
End: 2022-07-20
Attending: FAMILY MEDICINE
Payer: MEDICARE

## 2022-07-20 VITALS
TEMPERATURE: 96.8 F | SYSTOLIC BLOOD PRESSURE: 138 MMHG | HEART RATE: 68 BPM | OXYGEN SATURATION: 94 % | BODY MASS INDEX: 30.07 KG/M2 | WEIGHT: 191.6 LBS | HEIGHT: 67 IN | RESPIRATION RATE: 22 BRPM | DIASTOLIC BLOOD PRESSURE: 65 MMHG

## 2022-07-20 DIAGNOSIS — I48.20 CHRONIC ATRIAL FIBRILLATION (H): Primary | ICD-10-CM

## 2022-07-20 DIAGNOSIS — I10 ESSENTIAL HYPERTENSION: ICD-10-CM

## 2022-07-20 DIAGNOSIS — Z23 NEED FOR DIPHTHERIA-TETANUS-PERTUSSIS (TDAP) VACCINE: ICD-10-CM

## 2022-07-20 DIAGNOSIS — I50.82 BIVENTRICULAR CONGESTIVE HEART FAILURE (H): ICD-10-CM

## 2022-07-20 DIAGNOSIS — Z00.00 ENCOUNTER FOR MEDICARE ANNUAL WELLNESS EXAM: Primary | ICD-10-CM

## 2022-07-20 DIAGNOSIS — Z79.01 ANTICOAGULATION MONITORING, INR RANGE 2-3: ICD-10-CM

## 2022-07-20 DIAGNOSIS — N18.31 STAGE 3A CHRONIC KIDNEY DISEASE (H): ICD-10-CM

## 2022-07-20 DIAGNOSIS — Z79.01 LONG TERM CURRENT USE OF ANTICOAGULANT THERAPY: ICD-10-CM

## 2022-07-20 DIAGNOSIS — L98.9 SKIN LESION: ICD-10-CM

## 2022-07-20 DIAGNOSIS — Z12.5 SCREENING FOR PROSTATE CANCER: ICD-10-CM

## 2022-07-20 DIAGNOSIS — R73.09 ELEVATED GLUCOSE: ICD-10-CM

## 2022-07-20 DIAGNOSIS — I48.20 CHRONIC ATRIAL FIBRILLATION (H): ICD-10-CM

## 2022-07-20 DIAGNOSIS — E78.5 HYPERLIPIDEMIA, UNSPECIFIED HYPERLIPIDEMIA TYPE: ICD-10-CM

## 2022-07-20 LAB
ANION GAP SERPL CALCULATED.3IONS-SCNC: 7 MMOL/L (ref 3–14)
BUN SERPL-MCNC: 38 MG/DL (ref 7–25)
CALCIUM SERPL-MCNC: 9.4 MG/DL (ref 8.6–10.3)
CHLORIDE BLD-SCNC: 102 MMOL/L (ref 98–107)
CHOLEST SERPL-MCNC: 129 MG/DL
CO2 SERPL-SCNC: 32 MMOL/L (ref 21–31)
CREAT SERPL-MCNC: 1.49 MG/DL (ref 0.7–1.3)
FASTING STATUS PATIENT QL REPORTED: NO
GFR SERPL CREATININE-BSD FRML MDRD: 48 ML/MIN/1.73M2
GLUCOSE BLD-MCNC: 98 MG/DL (ref 70–105)
HBA1C MFR BLD: 5.7 % (ref 4–6.2)
HDLC SERPL-MCNC: 52 MG/DL (ref 23–92)
HOLD SPECIMEN: NORMAL
INR PPP: 3.68 (ref 0.85–1.15)
LDLC SERPL CALC-MCNC: 65 MG/DL
NONHDLC SERPL-MCNC: 77 MG/DL
POTASSIUM BLD-SCNC: 3.9 MMOL/L (ref 3.5–5.1)
PSA SERPL-MCNC: 4.42 UG/L (ref 0–4)
SODIUM SERPL-SCNC: 141 MMOL/L (ref 134–144)
TRIGL SERPL-MCNC: 62 MG/DL

## 2022-07-20 PROCEDURE — G0463 HOSPITAL OUTPT CLINIC VISIT: HCPCS

## 2022-07-20 PROCEDURE — 83036 HEMOGLOBIN GLYCOSYLATED A1C: CPT | Mod: ZL | Performed by: PHYSICIAN ASSISTANT

## 2022-07-20 PROCEDURE — 80061 LIPID PANEL: CPT | Mod: ZL | Performed by: PHYSICIAN ASSISTANT

## 2022-07-20 PROCEDURE — G0439 PPPS, SUBSEQ VISIT: HCPCS | Performed by: PHYSICIAN ASSISTANT

## 2022-07-20 PROCEDURE — 85610 PROTHROMBIN TIME: CPT | Mod: ZL | Performed by: PHYSICIAN ASSISTANT

## 2022-07-20 PROCEDURE — 36415 COLL VENOUS BLD VENIPUNCTURE: CPT | Mod: ZL | Performed by: PHYSICIAN ASSISTANT

## 2022-07-20 PROCEDURE — 80048 BASIC METABOLIC PNL TOTAL CA: CPT | Mod: ZL | Performed by: PHYSICIAN ASSISTANT

## 2022-07-20 PROCEDURE — G0103 PSA SCREENING: HCPCS | Mod: ZL | Performed by: PHYSICIAN ASSISTANT

## 2022-07-20 RX ORDER — WARFARIN SODIUM 3 MG/1
TABLET ORAL
Qty: 200 TABLET | Refills: 3 | Status: SHIPPED | OUTPATIENT
Start: 2022-07-20

## 2022-07-20 RX ORDER — METOPROLOL SUCCINATE 50 MG/1
50 TABLET, EXTENDED RELEASE ORAL DAILY
Qty: 90 TABLET | Refills: 1 | Status: SHIPPED | OUTPATIENT
Start: 2022-07-20 | End: 2022-12-13

## 2022-07-20 RX ORDER — TORSEMIDE 20 MG/1
TABLET ORAL
Qty: 180 TABLET | Refills: 8 | Status: SHIPPED | OUTPATIENT
Start: 2022-07-20 | End: 2023-07-31

## 2022-07-20 RX ORDER — DIGOXIN 250 MCG
250 TABLET ORAL DAILY
Qty: 90 TABLET | Refills: 11 | Status: SHIPPED | OUTPATIENT
Start: 2022-07-20 | End: 2023-07-31

## 2022-07-20 ASSESSMENT — ENCOUNTER SYMPTOMS
COUGH: 0
DYSURIA: 0
ARTHRALGIAS: 1
HEMATOCHEZIA: 0
JOINT SWELLING: 0
SORE THROAT: 0
EYE PAIN: 0
DIZZINESS: 0
CONSTIPATION: 0
DIARRHEA: 0
CHILLS: 0
PALPITATIONS: 0
NERVOUS/ANXIOUS: 0
WEAKNESS: 0
HEADACHES: 0
NAUSEA: 0
MYALGIAS: 0
SHORTNESS OF BREATH: 1
FEVER: 0
PARESTHESIAS: 1
FREQUENCY: 1
HEARTBURN: 0
HEMATURIA: 0
ABDOMINAL PAIN: 0

## 2022-07-20 ASSESSMENT — PAIN SCALES - GENERAL: PAINLEVEL: NO PAIN (0)

## 2022-07-20 ASSESSMENT — ACTIVITIES OF DAILY LIVING (ADL): CURRENT_FUNCTION: NO ASSISTANCE NEEDED

## 2022-07-20 NOTE — PATIENT INSTRUCTIONS
"Healthy Strategies  Eat at least 3 meals a day and never skip breakfast.  Eat more slowly.  Decrease portion size.  Provide structure by using meal replacement bars or shakes, and/or low calorie frozen meals.  For good nutrition incorporate fruit, vegetables, whole grains, lean protein, and low-fat dairy.  Remove trigger foods fromyour environment to avoid impulse eating.  Increase physical activity: get a pedometer and aim for 10,000 steps a day or 30-35 minutes of activity 5 days per week.  Weigh yourself daily or at least weekly.  Keep a record of what you eat and your activity.  Establish a support system suchas a friend, group or program.    11. Read Slava Segura's \"Eat to Live\". Remember it is important to have a minimum of 1200calories a day, okay to use olive oil, 40 grams of fiber daily. No more than two servings (the size of your palm) of red meat a week.     Please consider the following general health recommendations:    Eat a quality diet (generally, low in simple sugars, starches, cholesterol and saturated fat.)    Please get 1200 mg of calcium in divided doses with 800 units vitamin D in your diet daily. Take supplements as needed to obtain full recommended amounts.    Stay physically active. Regular walking or other exercise is one of the best ways to minimize pain of arthritis; maintain independence and mobility; maintain bone strength; maintain conditioning of your heart. Find something you enjoy and a friend to do it with you.    Maintain ideal weight. Your Body mass index is Body mass index is 30.46 kg/m .. Generally a BMI of 20-25 is considered ideal. Overweight is defined as 25-30, obese is 30-35 and markedly obese is greater than 35.    Apply sun block (SPF 25 or greater) on exposed skin anytime you are out in the sun to prevent skin cancer.     Wear a seatbelt whenever you are in a car.    Obtain a flu shot every fall.    You should have a tetanus booster at least once every 10 " years.    Colonoscopy (an exam of the colon) is recommended every 10 years after the age of 45 to screen for colon cancer. (More often if you are at increased risk.)    A vaccine to reduce your chances of getting shingles is available if you are over 50. Information about the vaccine is available through the clinic or at:  (https://www.cdc.gov/vaccines/vpd/shingles/public/shingrix/index.html)    Receive a pneumonia shot series after the age of 65 (repeat in 5 years if you have other risk factors). This does not prevent all types of pneumonia, but reduces the risk of the worst bacterial causes of pneumonia.    Check blood sugar annually. Cholesterol annually unless you have had a normal level when last checked within 5 years.     I recommend thatyou have a general physical exam every year.        Patient Education   Personalized Prevention Plan  You are due for the preventive services outlined below.  Your care team is available to assist you in scheduling these services.  If you have already completed any of these items, please share that information with your care team to update in your medical record.  Health Maintenance Due   Topic Date Due    Kidney Microalbumin Urine Test  Never done    Hepatitis C Screening  Never done    Cholesterol Lab  05/13/2022    Diptheria Tetanus Pertussis (DTAP/TDAP/TD) Vaccine (2 - Td or Tdap) 07/23/2022    Hemoglobin  08/27/2022     Your Health Risk Assessment indicates you feel you are not in good health    A healthy lifestyle helps keep the body fit and the mind alert. It helps protect you from disease, helps you fight disease, and helps prevent chronic disease (disease that doesn't go away) from getting worse. This is important as you get older and begin to notice twinges in muscles and joints and a decline in the strength and stamina you once took for granted. A healthy lifestyle includes good healthcare, good nutrition, weight control, recreation, and regular exercise. Avoid  harmful substances and do what you can to keep safe. Another part of a healthy lifestyle is stay mentally active and socially involved.    Good healthcare   Have a wellness visit every year.   If you have new symptoms, let us know right away. Don't wait until the next checkup.   Take medicines exactly as prescribed and keep your medicines in a safe place. Tell us if your medicine causes problems.   Healthy diet and weight control   Eat 3 or 4 small, nutritious, low-fat, high-fiber meals a day. Include a variety of fruits, vegetables, and whole-grain foods.   Make sure you get enough calcium in your diet. Calcium, vitamin D, and exercise help prevent osteoporosis (bone thinning).   If you live alone, try eating with others when you can. That way you get a good meal and have company while you eat it.   Try to keep a healthy weight. If you eat more calories than your body uses for energy, it will be stored as fat and you will gain weight.     Recreation   Recreation is not limited to sports and team events. It includes any activity that provides relaxation, interest, enjoyment, and exercise. Recreation provides an outlet for physical, mental, and social energy. It can give a sense of worth and achievement. It can help you stay healthy.    Mental Exercise and Social Involvement  Mental and emotional health is as important as physical health. Keep in touch with friends and family. Stay as active as possible. Continue to learn and challenge yourself.   Things you can do to stay mentally active are:  Learn something new, like a foreign language or musical instrument.   Play SCRABBLE or do crossword puzzles. If you cannot find people to play these games with you at home, you can play them with others on your computer through the Internet.   Join a games club--anything from card games to chess or checkers or lawn bowling.   Start a new hobby.   Go back to school.   Volunteer.   Read.   Keep up with world events.    Exercise  for a Healthier Heart  You may wonder how you can improve the health of your heart. If you re thinking about exercise, you re on the right track. You don t need to become an athlete. But you do need a certain amount of brisk exercise to help strengthen your heart. If you have been diagnosed with a heart condition, your healthcare provider may advise exercise to help stabilize your condition. To help make exercise a habit, choose safe, fun activities.      Exercise with a friend. When activity is fun, you're more likely to stick with it.   Before you start  Check with your healthcare provider before starting an exercise program. This is especially important if you have not been active for a while. It's also important if you have a long-term (chronic) health problem such as heart disease, diabetes, or obesity. Or if you are at high risk for having these problems.   Why exercise?  Exercising regularly offers many healthy rewards. It can help you do all of the following:   Improve your blood cholesterol level to help prevent further heart trouble  Lower your blood pressure to help prevent a stroke or heart attack  Control diabetes, or reduce your risk of getting this disease  Improve your heart and lung function  Reach and stay at a healthy weight  Make your muscles stronger so you can stay active  Prevent falls and fractures by slowing the loss of bone mass (osteoporosis)  Manage stress better  Reduce your blood pressure  Improve your sense of self and your body image  Exercise tips    Ease into your routine. Set small goals. Then build on them. If you are not sure what your activity level should be, talk with your healthcare provider first before starting an exercise routine.  Exercise on most days. Aim for a total of 150 minutes (2 hours and 30 minutes) or more of moderate-intensity aerobic activity each week. Or 75 minutes (1 hour and 15 minutes) or more of vigorous-intensity aerobic activity each week. Or try for a  combination of both. Moderate activity means that you breathe heavier and your heart rate increases but you can still talk. Think about doing 40 minutes of moderate exercise, 3 to 4 times a week. For best results, activity should last for about 40 minutes to lower blood pressure and cholesterol. It's OK to work up to the 40-minute period over time. Examples of moderate-intensity activity are walking 1 mile in 15 minutes. Or doing 30 to 45 minutes of yard work.  Step up your daily activity level.  Along with your exercise program, try being more active the whole day. Walk instead of drive. Or park further away so that you take more steps each day. Do more household tasks or yard work. You may not be able to meet the advised mount of physical activity. But doing some moderate- or vigorous-intensity aerobic activity can help reduce your risk for heart disease. Your healthcare provider can help you figure out what is best for you.  Choose 1 or more activities you enjoy.  Walking is one of the easiest things you can do. You can also try swimming, riding a bike, dancing, or taking an exercise class.    When to call your healthcare provider  Call your healthcare provider if you have any of these:   Chest pain or feel dizzy or lightheaded  Burning, tightness, pressure, or heaviness in your chest, neck, shoulders, back, or arms  Abnormal shortness of breath  More joint or muscle pain  A very fast or irregular heartbeat (palpitations)  [employer] last reviewed this educational content on 7/1/2019 2000-2021 [employer]. All rights reserved. This information is not intended as a substitute for professional medical care. Always follow your healthcare professional's instructions.          Signs of Hearing Loss      Hearing much better with one ear can be a sign of hearing loss.   Hearing loss is a problem shared by many people. In fact, it is one of the most common health problems, particularly as people age. Most  people age 65 and older have some hearing loss. By age 80, almost everyone does. Hearing loss often occurs slowly over the years. So you may not realize your hearing has gotten worse.  Have your hearing checked  Call your healthcare provider if you:  Have to strain to hear normal conversation  Have to watch other people s faces very carefully to follow what they re saying  Need to ask people to repeat what they ve said  Often misunderstand what people are saying  Turn the volume of the television or radio up so high that others complain  Feel that people are mumbling when they re talking to you  Find that the effort to hear leaves you feeling tired and irritated  Notice, when using the phone, that you hear better with one ear than the other  [employer] last reviewed this educational content on 1/1/2020 2000-2021 [employer]. All rights reserved. This information is not intended as a substitute for professional medical care. Always follow your healthcare professional's instructions.

## 2022-07-20 NOTE — NURSING NOTE
Pt presents to clinic today for a wellness visit. Patient has white spots on his fingers he would like to discuss that are painful.   Would like handicap parking certificate. His CPAP is not working properly and would like a new one.     FOOD SECURITY SCREENING QUESTIONS:    The next two questions are to help us understand your food security.  If you are feeling you need any assistance in this area, we have resources available to support you today.    Hunger Vital Signs:  Within the past 12 months we worried whether our food would run out before we got money to buy more. Never  Within the past 12 months the food we bought just didn't last and we didn't have money to get more. Never        Advance care directive on file? yes  Advance care directive provided to patient? no     Medication Reconciliation: complete  Brendan Dawson LPN,LPN on 7/20/2022 at 8:29 AM

## 2022-07-20 NOTE — PROGRESS NOTES
ANTICOAGULATION MANAGEMENT     Angel Garrett 76 year old male is on warfarin with supratherapeutic INR result. (Goal INR The patient does not have an active anticoagulation episode.)    Recent labs: (last 7 days)     07/20/22  0919   INR 3.68*       ASSESSMENT       Source(s): Chart Review and Patient/Caregiver Call       Warfarin doses taken: Warfarin taken as instructed    Diet: No new diet changes identified    New illness, injury, or hospitalization: Yes: Covid positive 2 weeks ago    Medication/supplement changes: increased amount of tylenol    Signs or symptoms of bleeding or clotting: No    Previous INR: Therapeutic last 2(+) visits    Additional findings: None       PLAN     Recommended plan for no diet, medication or health factor changes affecting INR     Dosing Instructions: hold dose then continue your current warfarin dose with next INR in 2 weeks       Summary  As of 7/20/2022    Full warfarin instructions:  7/20: Hold; Otherwise 9 mg every Sat; 6 mg all other days   Next INR check:               Telephone call with Pelon who verbalizes understanding and agrees to plan    will be moving to [place] and will be establishing care there to have INR monitored    Education provided: None required    Plan made per [facility] anticoagulation protocol    Saba Honeycutt, RN  Anticoagulation Clinic  7/20/2022    _______________________________________________________________________

## 2022-07-20 NOTE — PROGRESS NOTES
"SUBJECTIVE:   Angel Garrett is a 76 year old male who presents for Preventive Visit.      Patient has been advised of split billing requirements and indicates understanding: Yes  Are you in the first 12 months of your Medicare coverage?  No    Healthy Habits:     In general, how would you rate your overall health?  Fair    Frequency of exercise:  1 day/week    Duration of exercise:  Less than 15 minutes    Do you usually eat at least 4 servings of fruit and vegetables a day, include whole grains    & fiber and avoid regularly eating high fat or \"junk\" foods?  Yes    Taking medications regularly:  Yes    Medication side effects:  None    Ability to successfully perform activities of daily living:  No assistance needed    Home Safety:  No safety concerns identified    Hearing Impairment:  Difficulty understanding soft or whispered speech    In the past 6 months, have you been bothered by leaking of urine?  No    In general, how would you rate your overall mental or emotional health?  Good      PHQ-2 Total Score: 0    Additional concerns today:  Yes    Do you feel safe in your environment? Yes    Have you ever done Advance Care Planning? (For example, a Health Directive, POLST, or a discussion with a medical provider or your loved ones about your wishes): Yes, advance care planning is on file.       Fall risk  Fallen 2 or more times in the past year?: No  Any fall with injury in the past year?: No    Cognitive Screening   1) Repeat 3 items (Leader, Season, Table)    2) Clock draw: NORMAL  3) 3 item recall: Recalls 3 objects  Results: 3 items recalled: COGNITIVE IMPAIRMENT LESS LIKELY    Mini-CogTM Copyright LINDSAY London. Licensed by the author for use in [facility]; reprinted with permission (mari@.[facility]). All rights reserved.      Do you have sleep apnea, excessive snoring or daytime drowsiness?: yes    Reviewed and updated as needed this visit by clinical staff   Tobacco  Allergies  Meds      Soc Hx "          Reviewed and updated as needed this visit by Provider                   Social History     Tobacco Use     Smoking status: Former Smoker     Quit date: 1984     Years since quittin.8     Smokeless tobacco: Never Used   Substance Use Topics     Alcohol use: Yes     Alcohol/week: 5.8 standard drinks     Comment: Alcoholic Drinks/day: 1 drinks a day         Alcohol Use 2022   Prescreen: >3 drinks/day or >7 drinks/week? No     Recently had COVID-19. Stuffed up and fatigue.  2 weeks out.  No cough.  NO fevers, chills.      Patient recently had COVID-19.  He was stuffed up and had fatigue.  Currently 2 weeks out of the infection.  No history of a cough, fevers, or chills.  Keeping food and fluids down.  Currently feeling much better.  His wife is also sick.  Not lightheaded or dizzy.  No chest pain, palpitations, or problems breathing.    Patient currently follows with [facility].  No acute concerns at this medication.  Currently moving to [place].    He has noticed several new skin lesions on his body.  Also has painful cystic white lesions present on the tips of his fingers.  They are tender and painful.  Would like them examined.    Patient is history of chronic atrial fibrillation, hypertension, elevated glucose, hyperlipidemia, biventricular congestive heart failure, and chronic kidney disease.  Currently stable.  No acute concerns at this time.  Needs PSA completed for monitoring.      Current providers sharing in care for this patient include:   Patient Care Team:  Donte Mckeon MD as PCP - General (Family Practice)  Donte Mckeon MD as Assigned PCP    The following health maintenance items are reviewed in Epic and correct as of today:  Health Maintenance Due   Topic Date Due     MICROALBUMIN  Never done     HEPATITIS C SCREENING  Never done     LIPID  2022     DTAP/TDAP/TD IMMUNIZATION (2 - Td or Tdap) 2022     HEMOGLOBIN  2022     Labs reviewed in EPIC  BP  Readings from Last 3 Encounters:   22 138/65   03/10/22 138/78   22 132/60    Wt Readings from Last 3 Encounters:   22 86.9 kg (191 lb 9.6 oz)   03/10/22 90.7 kg (200 lb)   22 89.8 kg (198 lb)                  Patient Active Problem List   Diagnosis     Dysphonia     Chronic atrial fibrillation (H)     Anticoagulation monitoring, INR range 2-3     Hx of congenital heart disease     Hyperlipemia     Hypertension     Obesity     Obstructive sleep apnea     Peritonsillar abscess     Prediabetes     Pulmonary hypertension (H)     Fall at home     Long term current use of anticoagulant therapy     Closed fracture of multiple ribs of right side, initial encounter     Atrial septal defect     Arteriosclerosis of coronary artery     Acute renal failure (H)     Chronic kidney disease, stage 3 (H)     Past Surgical History:   Procedure Laterality Date     COLONOSCOPY  2009,Colonography     OTHER SURGICAL HISTORY      ,,OPEN CHOLECYSTECTOMY     OTHER SURGICAL HISTORY      ,42978,CARDIAC SURGERY,Pericardial patch, closure of ASD, repair of PFO and repair of anomalous pulmonary venous drainage.     OTHER SURGICAL HISTORY      2016,LUY881,CARDIAC CATHETERIZATION     Right total hip arthroplasty Right 10/2018    Demario Green MD,  [facility]     VASECTOMY      No Comments Provided       Social History     Tobacco Use     Smoking status: Former Smoker     Quit date: 1984     Years since quittin.8     Smokeless tobacco: Never Used   Substance Use Topics     Alcohol use: Yes     Alcohol/week: 5.8 standard drinks     Comment: Alcoholic Drinks/day: 1 drinks a day     Family History   Problem Relation Age of Onset     Heart Disease Mother         Heart Disease     Heart Disease Father         Heart Disease,CHF     Other - See Comments Father         PUD     Other - See Comments Brother         Liver or kidney cancer     Family History Negative Sister         Good  Health     Diabetes Brother         Diabetes     Family History Negative Brother         Good Health     Family History Negative Brother         Good Health     Family History Negative Brother         Good Health         Current Outpatient Medications   Medication Sig Dispense Refill     amLODIPine (NORVASC) 5 MG tablet        digoxin (LANOXIN) 250 MCG tablet Take 1 tablet (250 mcg) by mouth daily 90 tablet 11     fish oil-omega-3 fatty acids 1000 MG capsule Take 2 g by mouth daily       Glucosamine Sulfate 1000 MG TABS Take 2 tablets by mouth       metoprolol succinate ER (TOPROL XL) 50 MG 24 hr tablet Take 1 tablet (50 mg) by mouth daily 90 tablet 1     Tadalafil, PAH, (ADCIRCA PO) Take 20 mg by mouth 2 times daily       Tdap, tetanus-diptheria-acell pertussis, (BOOSTRIX) 5-2.5-18.5 LF-MCG/0.5 SUSP injection Inject 0.5 mLs into the muscle once for 1 dose 0.5 mL 0     torsemide (DEMADEX) 20 MG tablet Take two tablets in the am and one in the pm 180 tablet 8     warfarin ANTICOAGULANT (COUMADIN) 3 MG tablet TAKE 9 MG X 1 DAY AND 6 MG X 6 DAYS/WEEK. OR AS DIRECTED BY THE PROTIME CLINIC. 200 tablet 3     Allergies   Allergen Reactions     Pollen Extract Other (See Comments)     Nasal congestion  Nasal congestion     Recent Labs   Lab Test 05/11/22  1535 03/31/22  1112 09/16/21  1046 08/27/21  1244 06/14/21  1309 05/13/21  1001 11/23/20  1102 10/01/20  1029 09/01/20  1029 12/10/19  1053 11/26/19  1330 12/05/18  0945   A1C  --   --   --   --   --  5.6 5.5  --   --  6.1*  --  5.3   LDL  --   --   --   --   --  57  --   --   --  35  --  73   HDL  --   --   --   --   --  53  --   --   --  40  --  49   TRIG  --   --   --   --   --  65  --   --   --  69  --  95   ALT  --   --   --  7  --  9  --   --  10 12   < > 15   CR 1.45* 1.49*   < > 1.42* 1.39* 1.34* 1.45*   < > 1.43* 1.27   < > 1.38*   GFRESTIMATED 50* 48*   < > 48* 50* 52* 47*   < > 48* 55*   < > 50*   GFRESTBLACK  --   --   --   --  60* 63 57*   < > 58* 67   < > 61  "  POTASSIUM 3.5 4.2   < > 3.9  --  4.4 3.9   < > 4.0 3.8   < > 4.1    < > = values in this interval not displayed.              Review of Systems   Constitutional: Negative for chills and fever.   HENT: Positive for congestion. Negative for ear pain, hearing loss and sore throat.    Eyes: Negative for pain and visual disturbance.   Respiratory: Positive for shortness of breath. Negative for cough.    Cardiovascular: Negative for chest pain, palpitations and peripheral edema.   Gastrointestinal: Negative for abdominal pain, constipation, diarrhea, heartburn, hematochezia and nausea.   Genitourinary: Positive for frequency and urgency. Negative for dysuria, genital sores, hematuria, impotence and penile discharge.   Musculoskeletal: Positive for arthralgias. Negative for joint swelling and myalgias.   Skin: Negative for rash.   Neurological: Positive for paresthesias. Negative for dizziness, weakness and headaches.   Psychiatric/Behavioral: Negative for mood changes. The patient is not nervous/anxious.      Constitutional, HEENT, cardiovascular, pulmonary, gi and gu systems are negative, except as otherwise noted.    OBJECTIVE:   /65 (BP Location: Right arm, Patient Position: Sitting, Cuff Size: Adult Large)   Pulse 68   Temp 96.8  F (36  C) (Tympanic)   Resp 22   Ht 1.689 m (5' 6.5\")   Wt 86.9 kg (191 lb 9.6 oz)   SpO2 94%   BMI 30.46 kg/m   Estimated body mass index is 30.46 kg/m  as calculated from the following:    Height as of this encounter: 1.689 m (5' 6.5\").    Weight as of this encounter: 86.9 kg (191 lb 9.6 oz).  Physical Exam  GENERAL: healthy, alert and no distress  EYES: Eyes grossly normal to inspection, PERRL and conjunctivae and sclerae normal  HENT: ear canals and TM's normal, nose and mouth without ulcers or lesions  NECK: no adenopathy, no asymmetry, masses, or scars and thyroid normal to palpation  RESP: lungs clear to auscultation - no rales, rhonchi or wheezes  CV: regular rate and " rhythm, normal S1 S2, no S3 or S4, no murmur, click or rub, no peripheral edema and peripheral pulses strong  ABDOMEN: soft, nontender, no hepatosplenomegaly, no masses and bowel sounds normal  MS: no gross musculoskeletal defects noted, no edema  SKIN: no suspicious rashes.  Several medium brown skin papules appreciated scattered throughout his face, chest, arms, and legs.  They range in size from 2 to 7 mm in diameter.  Approximately 3 fingers have tender, white pustules appreciated in clusters.  NEURO: Normal strength and tone, mentation intact and speech normal  PSYCH: mentation appears normal, affect normal/bright    Diagnostic Test Results:  Labs reviewed in Epic    ASSESSMENT / PLAN:       ICD-10-CM    1. Encounter for Medicare annual wellness exam  Z00.00    2. Essential hypertension  I10 metoprolol succinate ER (TOPROL XL) 50 MG 24 hr tablet   3. Chronic atrial fibrillation (H)  I48.20 INR     Lipid Panel     torsemide (DEMADEX) 20 MG tablet     warfarin ANTICOAGULANT (COUMADIN) 3 MG tablet     digoxin (LANOXIN) 250 MCG tablet     INR     Lipid Panel   4. Long term current use of anticoagulant therapy  Z79.01 INR     Lipid Panel     warfarin ANTICOAGULANT (COUMADIN) 3 MG tablet     INR     Lipid Panel   5. Need for diphtheria-tetanus-pertussis (Tdap) vaccine  Z23 Tdap, tetanus-diptheria-acell pertussis, (BOOSTRIX) 5-2.5-18.5 LF-MCG/0.5 SUSP injection   6. Elevated glucose  R73.09 Basic Metabolic Panel     Hemoglobin A1c     Basic Metabolic Panel     Hemoglobin A1c   7. Hyperlipidemia, unspecified hyperlipidemia type  E78.5 Lipid Panel     Lipid Panel   8. Skin lesion  L98.9 Adult Dermatology Referral     CANCELED: Adult Dermatology Referral   9. Biventricular congestive heart failure (H)  I50.82    10. Stage 3a chronic kidney disease (H)  N18.31    11. Screening for prostate cancer  Z12.5 PSA Screen GH     Colon cancer screening last completed 11/18/2009 - colonoscopy.   cologuard - 1/2020, repeat in 3  "years  Abdominal aortic aneurysm screening last completed - monitored by [facility].    Hepatitis C screening - declines screening.    Vaccines reviewed - needs Tdap, sent to pharmacy.  History of tobacco abuse, need for low dose chest CT - na.   PSA completed - yes.     Skin lesions and finger cystic concerns-referred to dermatology for consult    Hyperlipidemia: Completed labs for monitoring.  Encourage good diet and exercise.    Biventricular congestive heart failure: Patient is currently stable.  Monitored by [facility].  No acute concerns at this time.    Stage IIIa chronic kidney disease: Patient is currently stable.  Complete lab work for monitoring today.  No acute concerns at this time.    Chronic atrial fibrillation: Complete labs for monitoring.  Refill medication.  No acute concerns at this time.  Continue to follow with [facility].    Hypertension: Refill medications.  No acute concerns at this time.  Completed labs for monitoring.    Elevated glucose: Completed BMP and hemoglobin A1c for monitoring.  No acute concerns at this time.  Encourage good diet and exercise.    Encouraged to establish care at [facility] with a PCP.    Patient has been advised of split billing requirements and indicates understanding: Yes    COUNSELING:  Reviewed preventive health counseling, as reflected in patient instructions       Regular exercise       Healthy diet/nutrition       Vision screening       Hearing screening       Dental care       Hepatitis C screening       HIV screening for high risk patient       Colon cancer screening       Prostate cancer screening    Estimated body mass index is 30.46 kg/m  as calculated from the following:    Height as of this encounter: 1.689 m (5' 6.5\").    Weight as of this encounter: 86.9 kg (191 lb 9.6 oz).        He reports that he quit smoking about 37 years ago. He has never used smokeless tobacco.      Appropriate preventive services were discussed with this patient, including " applicable screening as appropriate for cardiovascular disease, diabetes, osteopenia/osteoporosis, and glaucoma.  As appropriate for age/gender, discussed screening for colorectal cancer, prostate cancer, breast cancer, and cervical cancer. Checklist reviewing preventive services available has been given to the patient.    Reviewed patients plan of care and provided an AVS. The Basic Care Plan (routine screening as documented in Health Maintenance) for Angel meets the Care Plan requirement. This Care Plan has been established and reviewed with the Patient.    Counseling Resources:  ATP IV Guidelines  Pooled Cohorts Equation Calculator  Breast Cancer Risk Calculator  Breast Cancer: Medication to Reduce Risk  FRAX Risk Assessment  ICSI Preventive Guidelines  Dietary Guidelines for Americans, 2010  SANDOW's MyPlate  ASA Prophylaxis  Lung CA Screening    Ruth Murrell PA-C  [facility]    Identified Health Risks:    The patient was provided with suggestions to help him develop a healthy physical lifestyle.  He is at risk for lack of exercise and has been provided with information to increase physical activity for the benefit of his well-being.  The patient was provided with written information regarding signs of hearing loss.

## 2022-09-11 DIAGNOSIS — I48.20 CHRONIC ATRIAL FIBRILLATION (H): ICD-10-CM

## 2022-09-11 DIAGNOSIS — Z79.01 LONG TERM CURRENT USE OF ANTICOAGULANT THERAPY: ICD-10-CM

## 2022-09-12 RX ORDER — WARFARIN SODIUM 3 MG/1
TABLET ORAL
Qty: 190 TABLET | Refills: 1 | OUTPATIENT
Start: 2022-09-12

## 2022-09-12 NOTE — TELEPHONE ENCOUNTER
Spoke with pharmacy patient has refills on prescription sent to [facility] in [place]. They do not need this prescription. Unable to complete prescription refillper RN Medication Refill Policy....................  Saba Honeycutt RN....................  9/12/2022   1:21 PM

## 2022-09-17 ENCOUNTER — HEALTH MAINTENANCE LETTER (OUTPATIENT)
Age: 77
End: 2022-09-17

## 2022-12-08 DIAGNOSIS — I10 ESSENTIAL HYPERTENSION: ICD-10-CM

## 2022-12-08 DIAGNOSIS — I48.20 CHRONIC ATRIAL FIBRILLATION (H): ICD-10-CM

## 2022-12-08 DIAGNOSIS — Z79.01 LONG TERM CURRENT USE OF ANTICOAGULANT THERAPY: ICD-10-CM

## 2022-12-09 RX ORDER — WARFARIN SODIUM 3 MG/1
TABLET ORAL
Qty: 200 TABLET | Refills: 3 | OUTPATIENT
Start: 2022-12-09

## 2022-12-09 NOTE — TELEPHONE ENCOUNTER
Received refill requestion for warfarin-Called and spoke with patient. Patient verbalized that he will call his new anticoag team to have a new refill sent for his new PCP to sign. Esperanza Nicole RN on 12/9/2022 at 11:12 AM

## 2022-12-09 NOTE — TELEPHONE ENCOUNTER
[facility] [id number] in [facility]  sent Rx request for the following:      Requested Prescriptions   Pending Prescriptions Disp Refills     warfarin ANTICOAGULANT (COUMADIN) 3 MG tablet [Pharmacy Med Name: WARFARIN SODIUM 3 MG TABLET] 200 tablet 3     Sig: TAKE 9 MG ON ONE DAY/WEEK AND 6 MG ON SIX DAYS/WEEK OR AS DIRECTED BY THE PROTIME CLINIC.       Vitamin K Antagonists Failed - 12/8/2022 10:27 AM        Failed - INR is within goal in the past 6 weeks     Confirm INR is within goal in the past 6 weeks.     Recent Labs   Lab Test 07/20/22  0919   INR 3.68*        Refusing this Refill today due to establishing with new PCP down in [place]. Called and spoke with patient. Patient verbalized that he will call his new anticoag team to have them send a warfarin refill to his new PCP down in [place]. Esperanza Nicole RN on 12/9/2022 at 11:09 AM    Last Prescription Date:   7/20/22  Last Fill Qty/Refills:         200, R-3    Last Office Visit:              7/20/22  Future Office visit:           None  Esperanza Nicole RN on 12/9/2022 at 11:10 AM

## 2022-12-13 RX ORDER — METOPROLOL SUCCINATE 50 MG/1
TABLET, EXTENDED RELEASE ORAL
Qty: 90 TABLET | Refills: 1 | Status: SHIPPED | OUTPATIENT
Start: 2022-12-13 | End: 2023-05-02

## 2022-12-13 NOTE — TELEPHONE ENCOUNTER
" Disp Refills Start End AYSHA   metoprolol succinate ER (TOPROL XL) 50 MG 24 hr tablet 90 tablet 1 7/20/2022  No   Sig - Route: Take 1 tablet (50 mg) by mouth daily - Oral         LOV: 7/20/2022  Future Office visit: No future appointment scheduled at this time.      Routing refill request to provider for review/approval.  Per Quality report patient is due for: Labs, immunizations and vaccine, digoxin level    Requested Prescriptions   Pending Prescriptions Disp Refills     metoprolol succinate ER (TOPROL XL) 50 MG 24 hr tablet [Pharmacy Med Name: METOPROLOL SUCC ER 50 MG TAB] 90 tablet 1     Sig: TAKE 1 TABLET BY MOUTH EVERY DAY       Beta-Blockers Protocol Passed - 12/8/2022 10:10 AM        Passed - Blood pressure under 140/90 in past 12 months     BP Readings from Last 3 Encounters:   07/20/22 138/65   03/10/22 138/78   02/06/22 132/60                 Passed - Patient is age 6 or older        Passed - Recent (12 mo) or future (30 days) visit within the authorizing provider's specialty     Patient has had an office visit with the authorizing provider or a provider within the authorizing providers department within the previous 12 mos or has a future within next 30 days. See \"Patient Info\" tab in inbasket, or \"Choose Columns\" in Meds & Orders section of the refill encounter.              Passed - Medication is active on med list           Routed to provider for review and consideration. Rhonda Larry RN  ....................  12/13/2022   11:56 AM      "

## 2023-04-29 DIAGNOSIS — I10 ESSENTIAL HYPERTENSION: ICD-10-CM

## 2023-05-02 RX ORDER — METOPROLOL SUCCINATE 50 MG/1
TABLET, EXTENDED RELEASE ORAL
Qty: 90 TABLET | Refills: 0 | Status: SHIPPED | OUTPATIENT
Start: 2023-05-02 | End: 2023-08-23

## 2023-05-02 NOTE — TELEPHONE ENCOUNTER
[facility] #[id number] of [facility] in [place] sent Rx request for the following:      Requested Prescriptions   Pending Prescriptions Disp Refills     metoprolol succinate ER (TOPROL XL) 50 MG 24 hr tablet [Pharmacy Med Name: METOPROLOL SUCC ER 50 MG TAB] 90 tablet 1     Sig: TAKE 1 TABLET BY MOUTH EVERY DAY   Last Prescription Date:   12/13/22  Last Fill Qty/Refills:         90, R-1    Last Office Visit:              7/20/22   Future Office visit:           None    Per LOV note:  Encouraged to establish care at [facility] with a PCP. Return in about 53 weeks (around 7/26/2023) for Annual Wellness Visit.    Krystle Sargent RN .............. 5/2/2023  10:37 AM

## 2023-06-07 DIAGNOSIS — I48.20 CHRONIC ATRIAL FIBRILLATION (H): ICD-10-CM

## 2023-06-08 RX ORDER — DIGOXIN 250 MCG
TABLET ORAL
Qty: 90 TABLET | Refills: 0 | OUTPATIENT
Start: 2023-06-08

## 2023-06-08 NOTE — TELEPHONE ENCOUNTER
Nupur Ag  to [facility]    6/8/23  2:58 PM  Patient has moved to [facility], he is not scheduling an appoinment

## 2023-06-08 NOTE — TELEPHONE ENCOUNTER
[facility] #[id number] in [facility] sent Rx request for the following:      Requested Prescriptions   Pending Prescriptions Disp Refills     digoxin (LANOXIN) 250 MCG tablet [Pharmacy Med Name: DIGOXIN 250 MCG TABLET] 90 tablet 9     Sig: TAKE 1 TABLET BY MOUTH DAILY       Cardiac Glycoside Agents Protocol Failed - 6/8/2023 12:37 PM        Failed - Normal digoxin level on file in past 12 mos     No lab results found.    Invalid input(s):  [id number]        Failed - Normal serum creatinine on file in past 12 mos     Recent Labs   Lab Test 07/20/22  0919   CR 1.49*           Failed - Recent (6 mo) or future (30 days) visit within the authorizing provider's specialty     Last Prescription Date:   7/20/22  Last Fill Qty/Refills:         90, R-11    Last Office Visit:              7/20/22   Future Office visit:           None    Per LOV note: Return in about 53 weeks (around 7/26/2023) for Annual Wellness Visit.    Pt due for annual exam after 7/20/23. Routing to provider for refill consideration. Routing to Unit scheduling pool, to assist Pt in scheduling appointment.     Krystle Sargent RN .............. 6/8/2023  1:53 PM

## 2023-06-09 DIAGNOSIS — I10 ESSENTIAL HYPERTENSION: ICD-10-CM

## 2023-06-14 RX ORDER — METOPROLOL SUCCINATE 50 MG/1
TABLET, EXTENDED RELEASE ORAL
Qty: 90 TABLET | Refills: 1 | OUTPATIENT
Start: 2023-06-14

## 2023-06-14 NOTE — TELEPHONE ENCOUNTER
[id number] in [facility] [place] sent Rx request for the following:      Requested Prescriptions   Pending Prescriptions Disp Refills     metoprolol succinate ER (TOPROL XL) 50 MG 24 hr tablet [Pharmacy Med Name: METOPROLOL SUCC ER 50 MG TAB] 90 tablet 1     Sig: TAKE 1 TABLET BY MOUTH EVERY DAY       Beta-Blockers Protocol Passed - 6/14/2023  3:33 PM       Last Prescription Date:   5/2/23  Last Fill Qty/Refills:         90, R-0    Last Office Visit:               1/31/23 Holley Story ([place])  Future Office visit:           Patient no longer lives in the area.     Will decline and ask that refill be sent to [facility]. Amparo Balbuena RN on 6/14/2023 at 3:48 PM

## 2023-07-29 DIAGNOSIS — I48.20 CHRONIC ATRIAL FIBRILLATION (H): ICD-10-CM

## 2023-07-29 NOTE — LETTER
July 31, 2023      Angel Garrett  [address] [facility] [address]        Dear Angel,     This letter is to remind you that you are due for your annual exam with Donet Mckeon. Your last comprehensive visit was more than 12 months ago.      Please call the clinic at [phone] to schedule your appointment.      If you are no longer seeing Donte Mkceon for primary care, please call to let us know.       Thank you for choosing [facility] and [facility] for your health care needs.    Sincerely,    Refill PAIGE  [facility]

## 2023-07-31 RX ORDER — DIGOXIN 250 MCG
TABLET ORAL
Qty: 90 TABLET | Refills: 0 | Status: SHIPPED | OUTPATIENT
Start: 2023-07-31 | End: 2023-08-23

## 2023-07-31 RX ORDER — TORSEMIDE 20 MG/1
TABLET ORAL
Qty: 270 TABLET | Refills: 0 | Status: SHIPPED | OUTPATIENT
Start: 2023-07-31 | End: 2023-11-02

## 2023-07-31 NOTE — TELEPHONE ENCOUNTER
Routing refill request to provider for review/approval because:    LOV: 7/20/22    Patient due for annual review   Letter and my chart message sent  Will route to unit 4 scheduling to call patient and help assist in scheduling appointment.    Darlyn Carson RN on 7/31/2023 at 3:59 PM

## 2023-08-22 DIAGNOSIS — I48.20 CHRONIC ATRIAL FIBRILLATION (H): ICD-10-CM

## 2023-08-22 DIAGNOSIS — I10 ESSENTIAL HYPERTENSION: ICD-10-CM

## 2023-08-23 RX ORDER — DIGOXIN 250 MCG
TABLET ORAL
Qty: 90 TABLET | Refills: 11 | Status: SHIPPED | OUTPATIENT
Start: 2023-08-23

## 2023-08-23 RX ORDER — METOPROLOL SUCCINATE 50 MG/1
TABLET, EXTENDED RELEASE ORAL
Qty: 90 TABLET | Refills: 11 | Status: SHIPPED | OUTPATIENT
Start: 2023-08-23

## 2023-08-23 NOTE — TELEPHONE ENCOUNTER
[id number] sent Rx request for the following:      digoxin (LANOXIN) 250 MCG tablet 90 tablet 0 7/31/2023  No   Sig: TAKE 1 TABLET BY MOUTH DAILY   Sent to pharmacy as: Digoxin 250 MCG Oral Tablet (LANOXIN)   Class: E-Prescribe   Order: [id number]   E-Prescribing Status: Receipt confirmed by pharmacy (7/31/2023  5:32 PM CDT)     [facility] [address] [facility] - [place], [place] - [address] [place] [address]   Too soon. Cannot be transferred- no refills remaining.     metoprolol succinate ER (TOPROL XL) 50 MG 24 hr tablet [Pharmacy Med Name: METOPROLOL SUCC ER 50 MG TAB] 90 tablet 0     Sig: TAKE 1 TABLET BY MOUTH EVERY DAY   Last Prescription Date:   5/2/23  Last Fill Qty/Refills:         90, R-0      Beta-Blockers Protocol Failed - 8/23/2023  9:12 AM        Failed - Blood pressure under 140/90 in past 12 months     BP Readings from Last 3 Encounters:   07/20/22 138/65   03/10/22 138/78   02/06/22 132/60           Failed - Recent (12 mo) or future (30 days) visit within the authorizing provider's specialty     Last Office Visit:              7/20/22   Future Office visit:           None    Pt due for annual exam. Routing to provider for refill consideration. Routing to Unit scheduling pool, to assist Pt in scheduling appointment.     Krystle Sargent RN .............. 8/23/2023  9:20 AM

## 2023-09-19 NOTE — TELEPHONE ENCOUNTER
Patient Information     Patient Name MRN Angel Whitten [id number] Male 1945      Telephone Encounter by Breanna Thakur at 10/25/2017  8:19 AM     Author:  Breanna Thakur  Service:  (none) Author Type:  (none)     Filed:  10/25/2017  8:52 AM  Encounter Date:  10/24/2017 Status:  Addendum     :  Breanna Thakur        Related Notes: Original Note by Breanna Thakur filed at 10/25/2017  8:27 AM            Talked with patient and he states that he has had a 5 pound weight gain since the last visit. He has been taking 20 mg of torsemide daily. He is wondering if he should have this increased or not?  He has noticied some edema in his left foot. Please advise.  Breanna Thakur LPN  10/25/2017  8:26 AM                    Oriented - self; Oriented - place; Oriented - time

## 2023-10-30 DIAGNOSIS — I48.20 CHRONIC ATRIAL FIBRILLATION (H): ICD-10-CM

## 2023-11-02 RX ORDER — TORSEMIDE 20 MG/1
TABLET ORAL
Qty: 270 TABLET | Refills: 3 | Status: SHIPPED | OUTPATIENT
Start: 2023-11-02

## 2023-11-02 NOTE — TELEPHONE ENCOUNTER
"[facility] sent Rx request for the following:      Requested Prescriptions   Pending Prescriptions Disp Refills    torsemide (DEMADEX) 20 MG tablet [Pharmacy Med Name: TORSEMIDE 20 MG TABLET] 270 tablet 0     Sig: TAKE 2 TABLETS BY MOUTH EVERY MORNING AND 1 TABLET EVERY EVENING       Diuretics (Including Combos) Protocol Failed - 10/30/2023  4:00 PM        Failed - Blood pressure under 140/90 in past 12 months     BP Readings from Last 3 Encounters:   07/20/22 138/65   03/10/22 138/78   02/06/22 132/60                 Failed - Recent (12 mo) or future (30 days) visit within the authorizing provider's specialty     Patient has had an office visit with the authorizing provider or a provider within the authorizing providers department within the previous 12 mos or has a future within next 30 days. See \"Patient Info\" tab in inbasket, or \"Choose Columns\" in Meds & Orders section of the refill encounter.              Failed - Normal serum creatinine on file in past 12 months     Recent Labs   Lab Test 07/20/22  0919   CR 1.49*              Failed - Normal serum potassium on file in past 12 months     Recent Labs   Lab Test 07/20/22  0919   POTASSIUM 3.9                    Failed - Normal serum sodium on file in past 12 months     Recent Labs   Lab Test 07/20/22  0919                 Last Prescription Date:   7/31/2023  Last Fill Qty/Refills:         270, R-0    Last Office Visit:              7/20/2022 [place]   Future Office visit:           None    Patient had outside medicare wellness exam with [facility] on 1/31/2023. Will route to scheduling to assist with follow up with PCP.    More Fletcher RN on 11/2/2023 at 11:38 AM        "

## 2024-05-04 ENCOUNTER — HEALTH MAINTENANCE LETTER (OUTPATIENT)
Age: 79
End: 2024-05-04

## 2024-08-31 DIAGNOSIS — I48.20 CHRONIC ATRIAL FIBRILLATION (H): ICD-10-CM

## 2024-08-31 DIAGNOSIS — I10 ESSENTIAL HYPERTENSION: ICD-10-CM

## 2024-09-04 DIAGNOSIS — I10 ESSENTIAL HYPERTENSION: ICD-10-CM

## 2024-09-04 RX ORDER — METOPROLOL SUCCINATE 50 MG/1
TABLET, EXTENDED RELEASE ORAL
Qty: 90 TABLET | Refills: 8 | OUTPATIENT
Start: 2024-09-04

## 2024-09-04 RX ORDER — DIGOXIN 250 MCG
250 TABLET ORAL DAILY
Qty: 90 TABLET | Refills: 9 | OUTPATIENT
Start: 2024-09-04

## 2024-09-04 NOTE — TELEPHONE ENCOUNTER
Called patient and patient states that  his PCP is Iris Wagner in [place].  Rx's denied  Darlyn Carson RN on 9/4/2024 at 10:16 AM

## 2024-09-06 RX ORDER — METOPROLOL SUCCINATE 50 MG/1
TABLET, EXTENDED RELEASE ORAL
Qty: 90 TABLET | Refills: 8 | OUTPATIENT
Start: 2024-09-06

## 2024-09-06 NOTE — TELEPHONE ENCOUNTER
[id number] in [place] [facility] sent Rx request for the following:      Requested Prescriptions   Pending Prescriptions Disp Refills    metoprolol succinate ER (TOPROL XL) 50 MG 24 hr tablet [Pharmacy Med Name: METOPROLOL SUCC ER 50 MG TAB] 90 tablet 8     Sig: TAKE 1 TABLET BY MOUTH EVERY DAY     PCP: Iris Wagner of [place]. Pharmacy notified. Krystle Sargent RN .............. 9/6/2024  3:32 PM

## 2024-09-21 DIAGNOSIS — I48.20 CHRONIC ATRIAL FIBRILLATION (H): ICD-10-CM

## 2024-09-23 RX ORDER — DIGOXIN 250 MCG
250 TABLET ORAL DAILY
Qty: 90 TABLET | Refills: 0 | OUTPATIENT
Start: 2024-09-23

## 2024-09-23 NOTE — TELEPHONE ENCOUNTER
[facility] sent Rx request for the following:      Requested Prescriptions   Pending Prescriptions Disp Refills    digoxin (LANOXIN) 250 MCG tablet [Pharmacy Med Name: DIGOXIN 250 MCG TABLET] 90 tablet 9     Sig: TAKE 1 TABLET BY MOUTH EVERY DAY       Cardiac Glycoside Agents Protocol Failed - 9/23/2024 10:41 AM        Failed - Normal digoxin level on file in past 12 mos     No lab results found.          Failed - GFR on file in the past 12 months        Failed - Recent (6 mo) or future (90 days) visit within the authorizing provider's specialty     The patient must have completed an in-person or virtual visit within the past 6 months or has a future visit scheduled within the next 90 days with the authorizing provider s specialty.  Urgent care and e-visits do not quality as an office visit for this protocol.          Passed - Medication is active on med list        Passed - Medication indicated for associated diagnosis        Passed - Patient is 18 years of age or older             Last Prescription Date:   8/23/23  Last Fill Qty/Refills:         90, R-11    Last Office Visit:              7/20/22   Future Office visit:            None    Unable to complete prescription refill per RN Medication Refill Policy.     Herman Olsen RN on 9/23/2024 at 10:43 AM

## 2024-09-23 NOTE — TELEPHONE ENCOUNTER
Attempted to call patient. Wife answered and states they don't live here anymore. No medical information given.     Herman Olsen RN on 9/23/2024 at 12:03 PM

## 2024-09-23 NOTE — TELEPHONE ENCOUNTER
He has not been seen at [facility] since 7/20/22, appears he is receiving all of his care at [facility] with cardiology, rheumatology and family medicine. I would defer this to their team (have patient call PCP - Iris Wagner NP or cardiology for refill).     Isabelle Diego PA-C  9/23/2024  11:42 AM

## 2025-04-19 DIAGNOSIS — I10 ESSENTIAL HYPERTENSION: ICD-10-CM

## 2025-04-19 DIAGNOSIS — I48.20 CHRONIC ATRIAL FIBRILLATION (H): ICD-10-CM

## 2025-04-21 RX ORDER — METOPROLOL SUCCINATE 50 MG/1
50 TABLET, EXTENDED RELEASE ORAL
Qty: 90 TABLET | Refills: 8 | OUTPATIENT
Start: 2025-04-21

## 2025-04-21 RX ORDER — TORSEMIDE 20 MG/1
TABLET ORAL
Qty: 270 TABLET | Refills: 3 | OUTPATIENT
Start: 2025-04-21

## 2025-04-21 NOTE — TELEPHONE ENCOUNTER
[facility] #[id number] in [place] sent Rx request for the following:      Requested Prescriptions   Pending Prescriptions Disp Refills    torsemide (DEMADEX) 20 MG tablet [Pharmacy Med Name: TORSEMIDE 20 MG TABLET] 270 tablet 3     Sig: TAKE 2 TABLETS BY MOUTH EVERY MORNING AND 1 TABLET EVERY EVENING   Last Prescription Date:   11/2/23  Last Fill Qty/Refills:         270, R-3      Diuretics (Including Combos) Protocol Failed - 4/21/2025  3:13 PM        Failed - Most recent blood pressure under 140/90 in past 12 months     BP Readings from Last 3 Encounters:   07/20/22 138/65   03/10/22 138/78   02/06/22 132/60   No data recorded        Failed - Potassium level on file in past 12 months        Failed - Has GFR on file in past 12 months and most recent value is normal        Failed - Recent (12 mo) or future (90 days) visit within the authorizing provider's specialty       metoprolol succinate ER (TOPROL XL) 50 MG 24 hr tablet [Pharmacy Med Name: METOPROLOL SUCC ER 50 MG TAB] 90 tablet 8     Sig: TAKE 1 TABLET BY MOUTH EVERY DAY   Last Prescription Date:   8/23/23  Last Fill Qty/Refills:         90, R-11      Beta-Blockers Protocol Failed - 4/21/2025  3:13 PM        Failed - Most recent blood pressure under 140/90 in past 12 months     BP Readings from Last 3 Encounters:   07/20/22 138/65   03/10/22 138/78   02/06/22 132/60   No data recorded        Failed - Recent (12 mo) or future (90 days) visit within the authorizing provider's specialty     PCP: Iris Wagner of [place]. Pharmacy notified. Esperanza Gunderson RN .............. 4/21/2025  3:16 PM

## 2025-07-19 ENCOUNTER — HEALTH MAINTENANCE LETTER (OUTPATIENT)
Age: 80
End: 2025-07-19

## (undated) RX ORDER — LIDOCAINE HYDROCHLORIDE 10 MG/ML
INJECTION, SOLUTION INFILTRATION; PERINEURAL
Status: DISPENSED
Start: 2018-05-30

## (undated) RX ORDER — SODIUM CHLORIDE, SODIUM LACTATE, POTASSIUM CHLORIDE, CALCIUM CHLORIDE 600; 310; 30; 20 MG/100ML; MG/100ML; MG/100ML; MG/100ML
INJECTION, SOLUTION INTRAVENOUS
Status: DISPENSED
Start: 2019-11-26

## (undated) RX ORDER — BETAMETHASONE SODIUM PHOSPHATE AND BETAMETHASONE ACETATE 3; 3 MG/ML; MG/ML
INJECTION, SUSPENSION INTRA-ARTICULAR; INTRALESIONAL; INTRAMUSCULAR; SOFT TISSUE
Status: DISPENSED
Start: 2018-05-30

## (undated) RX ORDER — DEXAMETHASONE SODIUM PHOSPHATE 4 MG/ML
INJECTION, SOLUTION INTRA-ARTICULAR; INTRALESIONAL; INTRAMUSCULAR; INTRAVENOUS; SOFT TISSUE
Status: DISPENSED
Start: 2019-11-26

## (undated) RX ORDER — BUPIVACAINE HYDROCHLORIDE 5 MG/ML
INJECTION, SOLUTION EPIDURAL; INTRACAUDAL
Status: DISPENSED
Start: 2018-05-30

## (undated) RX ORDER — SODIUM CHLORIDE 9 MG/ML
INJECTION, SOLUTION INTRAVENOUS
Status: DISPENSED
Start: 2019-11-26